# Patient Record
Sex: MALE | Race: BLACK OR AFRICAN AMERICAN | NOT HISPANIC OR LATINO | Employment: FULL TIME | URBAN - METROPOLITAN AREA
[De-identification: names, ages, dates, MRNs, and addresses within clinical notes are randomized per-mention and may not be internally consistent; named-entity substitution may affect disease eponyms.]

---

## 2021-05-10 ENCOUNTER — IMMUNIZATION (OUTPATIENT)
Dept: PRIMARY CARE CLINIC | Facility: CLINIC | Age: 41
End: 2021-05-10
Payer: MEDICAID

## 2021-05-10 DIAGNOSIS — Z23 NEED FOR VACCINATION: Primary | ICD-10-CM

## 2021-05-10 PROCEDURE — 91300 COVID-19, MRNA, LNP-S, PF, 30 MCG/0.3 ML DOSE VACCINE: ICD-10-PCS | Mod: S$GLB,,, | Performed by: INTERNAL MEDICINE

## 2021-05-10 PROCEDURE — 91300 COVID-19, MRNA, LNP-S, PF, 30 MCG/0.3 ML DOSE VACCINE: CPT | Mod: S$GLB,,, | Performed by: INTERNAL MEDICINE

## 2021-05-10 PROCEDURE — 0001A COVID-19, MRNA, LNP-S, PF, 30 MCG/0.3 ML DOSE VACCINE: CPT | Mod: CV19,S$GLB,, | Performed by: INTERNAL MEDICINE

## 2021-05-10 PROCEDURE — 0001A COVID-19, MRNA, LNP-S, PF, 30 MCG/0.3 ML DOSE VACCINE: ICD-10-PCS | Mod: CV19,S$GLB,, | Performed by: INTERNAL MEDICINE

## 2021-06-04 ENCOUNTER — HOSPITAL ENCOUNTER (EMERGENCY)
Facility: HOSPITAL | Age: 41
Discharge: HOME | End: 2021-06-04
Attending: EMERGENCY MEDICINE
Payer: MEDICAID

## 2021-06-04 VITALS
BODY MASS INDEX: 39.25 KG/M2 | HEIGHT: 69 IN | OXYGEN SATURATION: 98 % | HEART RATE: 99 BPM | DIASTOLIC BLOOD PRESSURE: 86 MMHG | SYSTOLIC BLOOD PRESSURE: 159 MMHG | TEMPERATURE: 99 F | RESPIRATION RATE: 16 BRPM | WEIGHT: 265 LBS

## 2021-06-04 DIAGNOSIS — R10.9 ABDOMINAL PAIN: ICD-10-CM

## 2021-06-04 DIAGNOSIS — R73.9 HYPERGLYCEMIA: ICD-10-CM

## 2021-06-04 DIAGNOSIS — K59.00 CONSTIPATION: ICD-10-CM

## 2021-06-04 DIAGNOSIS — Z76.0 MEDICATION REFILL: Primary | ICD-10-CM

## 2021-06-04 LAB
ALBUMIN SERPL BCP-MCNC: 4.1 G/DL (ref 3.5–5.2)
ALP SERPL-CCNC: 77 U/L (ref 55–135)
ALT SERPL W/O P-5'-P-CCNC: 24 U/L (ref 10–44)
ANION GAP SERPL CALC-SCNC: 13 MMOL/L (ref 8–16)
AST SERPL-CCNC: 23 U/L (ref 10–40)
BASOPHILS # BLD AUTO: 0.03 K/UL (ref 0–0.2)
BASOPHILS NFR BLD: 0.4 % (ref 0–1.9)
BILIRUB SERPL-MCNC: 0.7 MG/DL (ref 0.1–1)
BUN SERPL-MCNC: 8 MG/DL (ref 6–20)
CALCIUM SERPL-MCNC: 10 MG/DL (ref 8.7–10.5)
CHLORIDE SERPL-SCNC: 100 MMOL/L (ref 95–110)
CO2 SERPL-SCNC: 21 MMOL/L (ref 23–29)
CREAT SERPL-MCNC: 0.9 MG/DL (ref 0.5–1.4)
DIFFERENTIAL METHOD: ABNORMAL
EOSINOPHIL # BLD AUTO: 0.1 K/UL (ref 0–0.5)
EOSINOPHIL NFR BLD: 0.6 % (ref 0–8)
ERYTHROCYTE [DISTWIDTH] IN BLOOD BY AUTOMATED COUNT: 14.6 % (ref 11.5–14.5)
EST. GFR  (AFRICAN AMERICAN): >60 ML/MIN/1.73 M^2
EST. GFR  (NON AFRICAN AMERICAN): >60 ML/MIN/1.73 M^2
GLUCOSE SERPL-MCNC: 215 MG/DL (ref 70–110)
HCT VFR BLD AUTO: 53.9 % (ref 40–54)
HGB BLD-MCNC: 18.7 G/DL (ref 14–18)
IMM GRANULOCYTES # BLD AUTO: 0.04 K/UL (ref 0–0.04)
IMM GRANULOCYTES NFR BLD AUTO: 0.5 % (ref 0–0.5)
LIPASE SERPL-CCNC: 11 U/L (ref 4–60)
LYMPHOCYTES # BLD AUTO: 2.3 K/UL (ref 1–4.8)
LYMPHOCYTES NFR BLD: 28.3 % (ref 18–48)
MCH RBC QN AUTO: 29.2 PG (ref 27–31)
MCHC RBC AUTO-ENTMCNC: 34.7 G/DL (ref 32–36)
MCV RBC AUTO: 84 FL (ref 82–98)
MONOCYTES # BLD AUTO: 1 K/UL (ref 0.3–1)
MONOCYTES NFR BLD: 12.7 % (ref 4–15)
NEUTROPHILS # BLD AUTO: 4.6 K/UL (ref 1.8–7.7)
NEUTROPHILS NFR BLD: 57.5 % (ref 38–73)
NRBC BLD-RTO: 0 /100 WBC
PLATELET # BLD AUTO: 310 K/UL (ref 150–450)
PMV BLD AUTO: 9.4 FL (ref 9.2–12.9)
POTASSIUM SERPL-SCNC: 5 MMOL/L (ref 3.5–5.1)
PROT SERPL-MCNC: 8.9 G/DL (ref 6–8.4)
RBC # BLD AUTO: 6.4 M/UL (ref 4.6–6.2)
SODIUM SERPL-SCNC: 134 MMOL/L (ref 136–145)
WBC # BLD AUTO: 8.06 K/UL (ref 3.9–12.7)

## 2021-06-04 PROCEDURE — 80053 COMPREHEN METABOLIC PANEL: CPT | Performed by: EMERGENCY MEDICINE

## 2021-06-04 PROCEDURE — 93010 ELECTROCARDIOGRAM REPORT: CPT | Mod: ,,, | Performed by: INTERNAL MEDICINE

## 2021-06-04 PROCEDURE — 86703 HIV-1/HIV-2 1 RESULT ANTBDY: CPT | Performed by: EMERGENCY MEDICINE

## 2021-06-04 PROCEDURE — 85025 COMPLETE CBC W/AUTO DIFF WBC: CPT | Performed by: EMERGENCY MEDICINE

## 2021-06-04 PROCEDURE — 99284 EMERGENCY DEPT VISIT MOD MDM: CPT | Mod: ,,, | Performed by: EMERGENCY MEDICINE

## 2021-06-04 PROCEDURE — 25000003 PHARM REV CODE 250: Performed by: EMERGENCY MEDICINE

## 2021-06-04 PROCEDURE — 93005 ELECTROCARDIOGRAM TRACING: CPT

## 2021-06-04 PROCEDURE — 99284 PR EMERGENCY DEPT VISIT,LEVEL IV: ICD-10-PCS | Mod: ,,, | Performed by: EMERGENCY MEDICINE

## 2021-06-04 PROCEDURE — 83690 ASSAY OF LIPASE: CPT | Performed by: EMERGENCY MEDICINE

## 2021-06-04 PROCEDURE — 93010 EKG 12-LEAD: ICD-10-PCS | Mod: ,,, | Performed by: INTERNAL MEDICINE

## 2021-06-04 PROCEDURE — 99285 EMERGENCY DEPT VISIT HI MDM: CPT | Mod: 25

## 2021-06-04 PROCEDURE — 86803 HEPATITIS C AB TEST: CPT | Performed by: EMERGENCY MEDICINE

## 2021-06-04 RX ORDER — ONDANSETRON 4 MG/1
4 TABLET, FILM COATED ORAL EVERY 6 HOURS
Qty: 12 TABLET | Refills: 0 | Status: SHIPPED | OUTPATIENT
Start: 2021-06-04 | End: 2022-02-08

## 2021-06-04 RX ORDER — METFORMIN HYDROCHLORIDE 500 MG/1
500 TABLET ORAL ONCE
Status: COMPLETED | OUTPATIENT
Start: 2021-06-04 | End: 2021-06-04

## 2021-06-04 RX ORDER — POLYETHYLENE GLYCOL 3350 17 G/17G
17 POWDER, FOR SOLUTION ORAL DAILY
Qty: 10 EACH | Refills: 0 | Status: SHIPPED | OUTPATIENT
Start: 2021-06-04 | End: 2022-02-08

## 2021-06-04 RX ORDER — ONDANSETRON 4 MG/1
4 TABLET, ORALLY DISINTEGRATING ORAL
Status: COMPLETED | OUTPATIENT
Start: 2021-06-04 | End: 2021-06-04

## 2021-06-04 RX ORDER — OMEPRAZOLE 20 MG/1
20 CAPSULE, DELAYED RELEASE ORAL DAILY
Qty: 20 CAPSULE | Refills: 0 | Status: SHIPPED | OUTPATIENT
Start: 2021-06-04 | End: 2022-02-08

## 2021-06-04 RX ORDER — LIDOCAINE HYDROCHLORIDE 20 MG/ML
10 SOLUTION OROPHARYNGEAL
Status: COMPLETED | OUTPATIENT
Start: 2021-06-04 | End: 2021-06-04

## 2021-06-04 RX ORDER — METFORMIN HYDROCHLORIDE 500 MG/1
500 TABLET ORAL 2 TIMES DAILY WITH MEALS
Qty: 60 TABLET | Refills: 0 | Status: SHIPPED | OUTPATIENT
Start: 2021-06-04 | End: 2021-07-04

## 2021-06-04 RX ORDER — MAG HYDROX/ALUMINUM HYD/SIMETH 200-200-20
30 SUSPENSION, ORAL (FINAL DOSE FORM) ORAL
Status: COMPLETED | OUTPATIENT
Start: 2021-06-04 | End: 2021-06-04

## 2021-06-04 RX ADMIN — ALUMINUM HYDROXIDE, MAGNESIUM HYDROXIDE, AND SIMETHICONE 30 ML: 200; 200; 20 SUSPENSION ORAL at 05:06

## 2021-06-04 RX ADMIN — ONDANSETRON 4 MG: 4 TABLET, ORALLY DISINTEGRATING ORAL at 05:06

## 2021-06-04 RX ADMIN — LIDOCAINE HYDROCHLORIDE 10 ML: 20 SOLUTION ORAL at 05:06

## 2021-06-04 RX ADMIN — METFORMIN HYDROCHLORIDE 500 MG: 500 TABLET ORAL at 07:06

## 2021-06-07 LAB
HCV AB SERPL QL IA: NEGATIVE
HIV 1+2 AB+HIV1 P24 AG SERPL QL IA: NEGATIVE

## 2021-06-10 ENCOUNTER — IMMUNIZATION (OUTPATIENT)
Dept: PRIMARY CARE CLINIC | Facility: CLINIC | Age: 41
End: 2021-06-10
Payer: MEDICAID

## 2021-06-10 DIAGNOSIS — Z23 NEED FOR VACCINATION: Primary | ICD-10-CM

## 2021-06-10 PROCEDURE — 0002A COVID-19, MRNA, LNP-S, PF, 30 MCG/0.3 ML DOSE VACCINE: ICD-10-PCS | Mod: CV19,S$GLB,, | Performed by: INTERNAL MEDICINE

## 2021-06-10 PROCEDURE — 91300 COVID-19, MRNA, LNP-S, PF, 30 MCG/0.3 ML DOSE VACCINE: CPT | Mod: S$GLB,,, | Performed by: INTERNAL MEDICINE

## 2021-06-10 PROCEDURE — 0002A COVID-19, MRNA, LNP-S, PF, 30 MCG/0.3 ML DOSE VACCINE: CPT | Mod: CV19,S$GLB,, | Performed by: INTERNAL MEDICINE

## 2021-06-10 PROCEDURE — 91300 COVID-19, MRNA, LNP-S, PF, 30 MCG/0.3 ML DOSE VACCINE: ICD-10-PCS | Mod: S$GLB,,, | Performed by: INTERNAL MEDICINE

## 2021-06-17 ENCOUNTER — PATIENT OUTREACH (OUTPATIENT)
Dept: ADMINISTRATIVE | Facility: HOSPITAL | Age: 41
End: 2021-06-17

## 2022-02-05 ENCOUNTER — HOSPITAL ENCOUNTER (EMERGENCY)
Facility: HOSPITAL | Age: 42
Discharge: HOME | End: 2022-02-05
Attending: EMERGENCY MEDICINE
Payer: MEDICAID

## 2022-02-05 VITALS
RESPIRATION RATE: 19 BRPM | TEMPERATURE: 98 F | OXYGEN SATURATION: 99 % | BODY MASS INDEX: 39.1 KG/M2 | HEIGHT: 69 IN | SYSTOLIC BLOOD PRESSURE: 145 MMHG | HEART RATE: 113 BPM | WEIGHT: 264 LBS | DIASTOLIC BLOOD PRESSURE: 77 MMHG

## 2022-02-05 DIAGNOSIS — R73.9 HYPERGLYCEMIA: ICD-10-CM

## 2022-02-05 DIAGNOSIS — L02.91 ABSCESS: Primary | ICD-10-CM

## 2022-02-05 DIAGNOSIS — J02.9 PHARYNGITIS, UNSPECIFIED ETIOLOGY: ICD-10-CM

## 2022-02-05 DIAGNOSIS — L03.115 CELLULITIS OF RIGHT LOWER EXTREMITY: ICD-10-CM

## 2022-02-05 DIAGNOSIS — R00.0 TACHYCARDIA: ICD-10-CM

## 2022-02-05 LAB
ALBUMIN SERPL-MCNC: 3.6 G/DL (ref 3.3–5.5)
ALLENS TEST: ABNORMAL
ALP SERPL-CCNC: 97 U/L (ref 42–141)
BASOPHILS # BLD AUTO: 0.07 K/UL (ref 0–0.2)
BASOPHILS NFR BLD: 0.4 % (ref 0–1.9)
BILIRUB SERPL-MCNC: 0.9 MG/DL (ref 0.2–1.6)
BILIRUBIN, POC UA: NEGATIVE
BLOOD, POC UA: ABNORMAL
BUN SERPL-MCNC: 10 MG/DL (ref 7–22)
CALCIUM SERPL-MCNC: 9.6 MG/DL (ref 8–10.3)
CHLORIDE SERPL-SCNC: 104 MMOL/L (ref 98–108)
CLARITY, POC UA: CLEAR
COLOR, POC UA: YELLOW
CREAT SERPL-MCNC: 0.4 MG/DL (ref 0.6–1.2)
CTP QC/QA: YES
DIFFERENTIAL METHOD: ABNORMAL
EOSINOPHIL # BLD AUTO: 0 K/UL (ref 0–0.5)
EOSINOPHIL NFR BLD: 0.2 % (ref 0–8)
ERYTHROCYTE [DISTWIDTH] IN BLOOD BY AUTOMATED COUNT: 13.2 % (ref 11.5–14.5)
GLUCOSE SERPL-MCNC: 295 MG/DL (ref 73–118)
GLUCOSE, POC UA: ABNORMAL
HCO3 UR-SCNC: 23.2 MMOL/L (ref 24–28)
HCT VFR BLD AUTO: 45.2 % (ref 40–54)
HGB BLD-MCNC: 14.7 G/DL (ref 14–18)
IMM GRANULOCYTES # BLD AUTO: 0.12 K/UL (ref 0–0.04)
IMM GRANULOCYTES NFR BLD AUTO: 0.7 % (ref 0–0.5)
INFLUENZA A ANTIGEN, POC: NEGATIVE
INFLUENZA B ANTIGEN, POC: NEGATIVE
KETONES, POC UA: ABNORMAL
LDH SERPL L TO P-CCNC: 1.12 MMOL/L (ref 0.5–2.2)
LEUKOCYTE EST, POC UA: NEGATIVE
LYMPHOCYTES # BLD AUTO: 1 K/UL (ref 1–4.8)
LYMPHOCYTES NFR BLD: 5.9 % (ref 18–48)
MCH RBC QN AUTO: 28.8 PG (ref 27–31)
MCHC RBC AUTO-ENTMCNC: 32.5 G/DL (ref 32–36)
MCV RBC AUTO: 89 FL (ref 82–98)
MONOCYTES # BLD AUTO: 1.1 K/UL (ref 0.3–1)
MONOCYTES NFR BLD: 6.6 % (ref 4–15)
NEUTROPHILS # BLD AUTO: 14.2 K/UL (ref 1.8–7.7)
NEUTROPHILS NFR BLD: 86.2 % (ref 38–73)
NITRITE, POC UA: NEGATIVE
NRBC BLD-RTO: 0 /100 WBC
PCO2 BLDA: 39.8 MMHG (ref 35–45)
PH SMN: 7.37 [PH] (ref 7.35–7.45)
PH UR STRIP: 6 [PH]
PLATELET # BLD AUTO: 394 K/UL (ref 150–450)
PMV BLD AUTO: 10.1 FL (ref 9.2–12.9)
PO2 BLDA: 30 MMHG (ref 40–60)
POC ALT (SGPT): 21 U/L (ref 10–47)
POC AST (SGOT): 18 U/L (ref 11–38)
POC B-TYPE NATRIURETIC PEPTIDE: 5.9 PG/ML (ref 0–100)
POC BE: -2 MMOL/L
POC CARDIAC TROPONIN I: 0 NG/ML
POC CARDIAC TROPONIN I: 0 NG/ML
POC PTINR: 1.4 (ref 0.9–1.2)
POC PTWBT: 16.2 SEC (ref 9.7–14.3)
POC RAPID STREP A: NEGATIVE
POC SATURATED O2: 55 % (ref 95–100)
POC TCO2: 24 MMOL/L (ref 24–29)
POC TCO2: 27 MMOL/L (ref 18–33)
POCT GLUCOSE: 300 MG/DL (ref 70–110)
POTASSIUM BLD-SCNC: 4.5 MMOL/L (ref 3.6–5.1)
PROCALCITONIN SERPL IA-MCNC: 0.55 NG/ML
PROTEIN, POC UA: ABNORMAL
PROTEIN, POC: 8.4 G/DL (ref 6.4–8.1)
RBC # BLD AUTO: 5.1 M/UL (ref 4.6–6.2)
SAMPLE: ABNORMAL
SAMPLE: ABNORMAL
SAMPLE: NORMAL
SAMPLE: NORMAL
SARS-COV-2 RDRP RESP QL NAA+PROBE: NEGATIVE
SITE: ABNORMAL
SODIUM BLD-SCNC: 136 MMOL/L (ref 128–145)
SPECIFIC GRAVITY, POC UA: 1.02
UROBILINOGEN, POC UA: 1 E.U./DL
WBC # BLD AUTO: 16.49 K/UL (ref 3.9–12.7)

## 2022-02-05 PROCEDURE — 85610 PROTHROMBIN TIME: CPT | Mod: ER

## 2022-02-05 PROCEDURE — 93005 ELECTROCARDIOGRAM TRACING: CPT | Mod: ER

## 2022-02-05 PROCEDURE — 85025 COMPLETE CBC W/AUTO DIFF WBC: CPT | Performed by: EMERGENCY MEDICINE

## 2022-02-05 PROCEDURE — 96361 HYDRATE IV INFUSION ADD-ON: CPT | Mod: ER

## 2022-02-05 PROCEDURE — 80053 COMPREHEN METABOLIC PANEL: CPT | Mod: ER

## 2022-02-05 PROCEDURE — 83880 ASSAY OF NATRIURETIC PEPTIDE: CPT | Mod: ER

## 2022-02-05 PROCEDURE — 93010 ELECTROCARDIOGRAM REPORT: CPT | Mod: ,,, | Performed by: INTERNAL MEDICINE

## 2022-02-05 PROCEDURE — 99291 CRITICAL CARE FIRST HOUR: CPT | Mod: 25,ER

## 2022-02-05 PROCEDURE — 63600175 PHARM REV CODE 636 W HCPCS: Mod: ER | Performed by: EMERGENCY MEDICINE

## 2022-02-05 PROCEDURE — 87502 INFLUENZA DNA AMP PROBE: CPT | Mod: ER

## 2022-02-05 PROCEDURE — 81003 URINALYSIS AUTO W/O SCOPE: CPT | Mod: ER

## 2022-02-05 PROCEDURE — 82962 GLUCOSE BLOOD TEST: CPT | Mod: ER

## 2022-02-05 PROCEDURE — 84145 PROCALCITONIN (PCT): CPT | Performed by: EMERGENCY MEDICINE

## 2022-02-05 PROCEDURE — 87186 SC STD MICRODIL/AGAR DIL: CPT | Performed by: EMERGENCY MEDICINE

## 2022-02-05 PROCEDURE — 96374 THER/PROPH/DIAG INJ IV PUSH: CPT | Mod: ER,59

## 2022-02-05 PROCEDURE — 25000003 PHARM REV CODE 250: Mod: ER | Performed by: EMERGENCY MEDICINE

## 2022-02-05 PROCEDURE — 87077 CULTURE AEROBIC IDENTIFY: CPT | Performed by: EMERGENCY MEDICINE

## 2022-02-05 PROCEDURE — 82803 BLOOD GASES ANY COMBINATION: CPT | Mod: ER

## 2022-02-05 PROCEDURE — 93010 EKG 12-LEAD: ICD-10-PCS | Mod: ,,, | Performed by: INTERNAL MEDICINE

## 2022-02-05 PROCEDURE — 87040 BLOOD CULTURE FOR BACTERIA: CPT | Mod: 59 | Performed by: EMERGENCY MEDICINE

## 2022-02-05 PROCEDURE — 96375 TX/PRO/DX INJ NEW DRUG ADDON: CPT | Mod: ER,59

## 2022-02-05 PROCEDURE — 10060 I&D ABSCESS SIMPLE/SINGLE: CPT | Mod: ER

## 2022-02-05 PROCEDURE — 87070 CULTURE OTHR SPECIMN AEROBIC: CPT | Mod: 59 | Performed by: EMERGENCY MEDICINE

## 2022-02-05 PROCEDURE — 85025 COMPLETE CBC W/AUTO DIFF WBC: CPT | Mod: ER

## 2022-02-05 PROCEDURE — 96372 THER/PROPH/DIAG INJ SC/IM: CPT | Mod: 59,ER

## 2022-02-05 PROCEDURE — U0002 COVID-19 LAB TEST NON-CDC: HCPCS | Mod: ER | Performed by: EMERGENCY MEDICINE

## 2022-02-05 PROCEDURE — 84484 ASSAY OF TROPONIN QUANT: CPT | Mod: ER

## 2022-02-05 RX ORDER — ACETAMINOPHEN 500 MG
1000 TABLET ORAL EVERY 6 HOURS PRN
Qty: 30 TABLET | Refills: 0 | Status: SHIPPED | OUTPATIENT
Start: 2022-02-05 | End: 2022-02-05 | Stop reason: SDUPTHER

## 2022-02-05 RX ORDER — CLINDAMYCIN HYDROCHLORIDE 150 MG/1
300 CAPSULE ORAL 4 TIMES DAILY
Qty: 80 CAPSULE | Refills: 0 | Status: ON HOLD | OUTPATIENT
Start: 2022-02-05 | End: 2022-02-13 | Stop reason: HOSPADM

## 2022-02-05 RX ORDER — KETOROLAC TROMETHAMINE 30 MG/ML
15 INJECTION, SOLUTION INTRAMUSCULAR; INTRAVENOUS
Status: COMPLETED | OUTPATIENT
Start: 2022-02-05 | End: 2022-02-05

## 2022-02-05 RX ORDER — MORPHINE SULFATE 4 MG/ML
6 INJECTION, SOLUTION INTRAMUSCULAR; INTRAVENOUS
Status: COMPLETED | OUTPATIENT
Start: 2022-02-05 | End: 2022-02-05

## 2022-02-05 RX ORDER — LIDOCAINE HYDROCHLORIDE 20 MG/ML
SOLUTION OROPHARYNGEAL
Qty: 60 ML | Refills: 0 | Status: SHIPPED | OUTPATIENT
Start: 2022-02-05

## 2022-02-05 RX ORDER — CLINDAMYCIN PHOSPHATE 150 MG/ML
600 INJECTION, SOLUTION INTRAVENOUS
Status: COMPLETED | OUTPATIENT
Start: 2022-02-05 | End: 2022-02-05

## 2022-02-05 RX ORDER — OXYCODONE AND ACETAMINOPHEN 5; 325 MG/1; MG/1
1 TABLET ORAL EVERY 8 HOURS PRN
Qty: 6 TABLET | Refills: 0 | Status: ON HOLD | OUTPATIENT
Start: 2022-02-05 | End: 2022-02-13 | Stop reason: HOSPADM

## 2022-02-05 RX ORDER — OXYCODONE AND ACETAMINOPHEN 5; 325 MG/1; MG/1
2 TABLET ORAL
Status: COMPLETED | OUTPATIENT
Start: 2022-02-05 | End: 2022-02-05

## 2022-02-05 RX ORDER — AMOXICILLIN AND CLAVULANATE POTASSIUM 875; 125 MG/1; MG/1
1 TABLET, FILM COATED ORAL 2 TIMES DAILY
Qty: 20 TABLET | Refills: 0 | Status: SHIPPED | OUTPATIENT
Start: 2022-02-05 | End: 2022-02-05

## 2022-02-05 RX ORDER — ACETAMINOPHEN 500 MG
500 TABLET ORAL EVERY 6 HOURS PRN
Qty: 30 TABLET | Refills: 0 | Status: SHIPPED | OUTPATIENT
Start: 2022-02-05

## 2022-02-05 RX ORDER — MUPIROCIN 20 MG/G
OINTMENT TOPICAL
Status: COMPLETED | OUTPATIENT
Start: 2022-02-05 | End: 2022-02-05

## 2022-02-05 RX ORDER — LIDOCAINE HYDROCHLORIDE 10 MG/ML
5 INJECTION INFILTRATION; PERINEURAL
Status: COMPLETED | OUTPATIENT
Start: 2022-02-05 | End: 2022-02-05

## 2022-02-05 RX ORDER — ACETAMINOPHEN 500 MG
1000 TABLET ORAL
Status: DISCONTINUED | OUTPATIENT
Start: 2022-02-05 | End: 2022-02-05

## 2022-02-05 RX ORDER — LIDOCAINE HYDROCHLORIDE 20 MG/ML
SOLUTION OROPHARYNGEAL
Qty: 60 ML | Refills: 0 | Status: SHIPPED | OUTPATIENT
Start: 2022-02-05 | End: 2022-02-05 | Stop reason: SDUPTHER

## 2022-02-05 RX ORDER — IBUPROFEN 600 MG/1
600 TABLET ORAL EVERY 6 HOURS PRN
Qty: 20 TABLET | Refills: 0 | Status: SHIPPED | OUTPATIENT
Start: 2022-02-05 | End: 2022-02-05 | Stop reason: SDUPTHER

## 2022-02-05 RX ORDER — IBUPROFEN 600 MG/1
600 TABLET ORAL EVERY 6 HOURS PRN
Qty: 20 TABLET | Refills: 0 | Status: SHIPPED | OUTPATIENT
Start: 2022-02-05

## 2022-02-05 RX ADMIN — MUPIROCIN: 20 OINTMENT TOPICAL at 09:02

## 2022-02-05 RX ADMIN — CLINDAMYCIN PHOSPHATE 600 MG: 150 INJECTION, SOLUTION INTRAMUSCULAR; INTRAVENOUS at 09:02

## 2022-02-05 RX ADMIN — MORPHINE SULFATE 6 MG: 4 INJECTION, SOLUTION INTRAMUSCULAR; INTRAVENOUS at 11:02

## 2022-02-05 RX ADMIN — OXYCODONE HYDROCHLORIDE AND ACETAMINOPHEN 2 TABLET: 5; 325 TABLET ORAL at 02:02

## 2022-02-05 RX ADMIN — KETOROLAC TROMETHAMINE 15 MG: 30 INJECTION, SOLUTION INTRAMUSCULAR at 09:02

## 2022-02-05 RX ADMIN — SODIUM CHLORIDE 1000 ML: 0.9 INJECTION, SOLUTION INTRAVENOUS at 02:02

## 2022-02-05 RX ADMIN — LIDOCAINE HYDROCHLORIDE 5 ML: 10 INJECTION, SOLUTION INFILTRATION; PERINEURAL at 02:02

## 2022-02-05 RX ADMIN — SODIUM CHLORIDE 2000 ML: 0.9 INJECTION, SOLUTION INTRAVENOUS at 09:02

## 2022-02-05 NOTE — Clinical Note
"Chadd Cullenlee Alonzo was seen and treated in our emergency department on 2/5/2022.  He may return to work on 02/08/2022.       If you have any questions or concerns, please don't hesitate to call.      Jayshree Hernandez, DO"

## 2022-02-05 NOTE — ED PROVIDER NOTES
"Encounter Date: 2/5/2022    SCRIBE #1 NOTE: I, Antonina Mack, am scribing for, and in the presence of,  Jayshree Hernandez DO. I have scribed the following portions of the note - Other sections scribed: HPI; ROS; PE.       History     Chief Complaint   Patient presents with    Abscess     Complains of painful abscess to R side of groin x3 days.    COVID-19 Concerns     Complains of headache, nasal congestion, sore throat, body aches, and nausea x4 days.     Chadd Alonzo is a 41 y.o. male with Hx of DM and HTN who presents to the ED for chief complaint of sore throat onset 4 days ago and "boil" to right upper thigh onset 2 days ago. Patient reports that the "boil" has been draining. He notes having an associated symptom of myalgias. Patient endorses taking the Covid-19 vaccine but denies taking the Influenza vaccine. He denies previous history of a Covid-19 infection. Patient denies fever. No further complaints at this present time.     The history is provided by the patient. No  was used.     Review of patient's allergies indicates:  No Known Allergies  Past Medical History:   Diagnosis Date    Diabetes mellitus     Hypertension      History reviewed. No pertinent surgical history.  History reviewed. No pertinent family history.  Social History     Tobacco Use    Smoking status: Never Smoker    Smokeless tobacco: Never Used   Substance Use Topics    Alcohol use: No    Drug use: No     Review of Systems   Constitutional: Negative for chills and fever.   HENT: Positive for sore throat.    Respiratory: Negative for shortness of breath.    Cardiovascular: Negative for chest pain.   Gastrointestinal: Negative for abdominal pain, diarrhea, nausea and vomiting.   Genitourinary: Negative for dysuria.   Musculoskeletal: Positive for myalgias.   Skin: Positive for wound.   Neurological: Negative for headaches.   All other systems reviewed and are negative.      Physical Exam     Initial Vitals [02/05/22 " 0835]   BP Pulse Resp Temp SpO2   (!) 145/81 (!) 140 20 99.2 °F (37.3 °C) 97 %      MAP       --         Patient gave consent to have physical exam performed.  Physical Exam    Nursing note and vitals reviewed.  Constitutional: He appears well-developed and well-nourished.   HENT:   Head: Normocephalic and atraumatic.   Right Ear: External ear normal.   Left Ear: External ear normal.   Nose: Nose normal.   Mouth/Throat: Posterior oropharyngeal erythema present. No oropharyngeal exudate or posterior oropharyngeal edema.   Eyes: Conjunctivae and EOM are normal. Pupils are equal, round, and reactive to light.   Neck: Neck supple.   Normal range of motion.  Cardiovascular: Regular rhythm and normal heart sounds. Tachycardia present.  Exam reveals no gallop and no friction rub.    No murmur heard.  Pulmonary/Chest: Breath sounds normal. Tachypnea noted. No respiratory distress. He has no wheezes. He has no rhonchi. He has no rales.   Abdominal: Abdomen is soft. Bowel sounds are normal. There is no abdominal tenderness. There is no rebound and no guarding.   Musculoskeletal:         General: No tenderness or edema. Normal range of motion.      Cervical back: Normal range of motion and neck supple.     Neurological: He is alert and oriented to person, place, and time. No cranial nerve deficit.   Skin: Skin is warm and dry. Capillary refill takes less than 2 seconds. No rash noted.        Psychiatric: He has a normal mood and affect. His behavior is normal.         ED Course   I & D - Incision and Drainage    Date/Time: 2/5/2022 1:36 PM  Location procedure was performed: [facility] EMERGENCY DEPARTMENT  Performed by: Jayshree Hernandez DO  Authorized by: Jayshree Hernandez DO   Consent Done: Yes  Consent: Verbal consent obtained.  Risks and benefits: risks, benefits and alternatives were discussed  Consent given by: patient  Patient understanding: patient states understanding of the procedure being performed  Patient consent: the patient's  "understanding of the procedure matches consent given  Procedure consent: procedure consent matches procedure scheduled  Required items: required blood products, implants, devices, and special equipment available  Patient identity confirmed: verbally with patient  Time out: Immediately prior to procedure a "time out" was called to verify the correct patient, procedure, equipment, support staff and site/side marked as required.  Type: abscess  Body area: lower extremity (Right thigh)  Anesthesia: local infiltration    Anesthesia:  Local Anesthetic: lidocaine 1% with epinephrine  Anesthetic total: 10 mL    Patient sedated: no  Scalpel size: 11  Incision type: single straight  Complexity: simple  Drainage: serosanguinous  Drainage amount: scant  Wound treatment: incision  Packing material: 1/4 in gauze  Complications: No  Specimens: No  Implants: No  Patient tolerance: Patient tolerated the procedure well with no immediate complications  Comments: Aerobic culture pending.    Critical Care    Date/Time: 2/5/2022 3:53 PM  Performed by: Jayshree Hernandez DO  Authorized by: Jayshree Hernandez DO   Direct patient critical care time: 8 minutes  Additional history critical care time: 8 minutes  Ordering / reviewing critical care time: 8 minutes  Documentation critical care time: 6 minutes  Total critical care time (exclusive of procedural time) : 30 minutes  Critical care was necessary to treat or prevent imminent or life-threatening deterioration of the following conditions: circulatory failure, metabolic crisis and dehydration.  Critical care was time spent personally by me on the following activities: evaluation of patient's response to treatment, examination of patient, obtaining history from patient or surrogate, ordering and performing treatments and interventions, ordering and review of laboratory studies, ordering and review of radiographic studies, pulse oximetry, re-evaluation of patient's condition and review of old " charts.        Labs Reviewed   PROCALCITONIN - Abnormal; Notable for the following components:       Result Value    Procalcitonin 0.55 (*)     All other components within normal limits   CBC W/ AUTO DIFFERENTIAL - Abnormal; Notable for the following components:    WBC 16.49 (*)     Immature Granulocytes 0.7 (*)     Gran # (ANC) 14.2 (*)     Immature Grans (Abs) 0.12 (*)     Mono # 1.1 (*)     Gran % 86.2 (*)     Lymph % 5.9 (*)     All other components within normal limits   POCT URINALYSIS W/O SCOPE - Abnormal; Notable for the following components:    Glucose, UA 2+ (*)     Ketones, UA 3+ (*)     Blood, UA Trace-intact (*)     Protein, UA Trace (*)     All other components within normal limits   POCT GLUCOSE - Abnormal; Notable for the following components:    POCT Glucose 300 (*)     All other components within normal limits   ISTAT PROCEDURE - Abnormal; Notable for the following components:    POC PTWBT 16.2 (*)     POC PTINR 1.4 (*)     All other components within normal limits   ISTAT PROCEDURE - Abnormal; Notable for the following components:    POC PO2 30 (*)     POC HCO3 23.2 (*)     POC SATURATED O2 55 (*)     All other components within normal limits   POCT CMP - Abnormal; Notable for the following components:    POC Creatinine 0.4 (*)     POC Glucose 295 (*)     Protein, UA 8.4 (*)     All other components within normal limits   CULTURE, BLOOD   CULTURE, BLOOD   CULTURE, AEROBIC  (SPECIFY SOURCE)   TROPONIN ISTAT   TROPONIN ISTAT   SARS-COV-2 RDRP GENE    Narrative:     This test utilizes isothermal nucleic acid amplification   technology to detect the SARS-CoV-2 RdRp nucleic acid segment.   The analytical sensitivity (limit of detection) is 125 genome   equivalents/mL.   A POSITIVE result implies infection with the SARS-CoV-2 virus;   the patient is presumed to be contagious.     A NEGATIVE result means that SARS-CoV-2 nucleic acids are not   present above the limit of detection. A NEGATIVE result  should be   treated as presumptive. It does not rule out the possibility of   COVID-19 and should not be the sole basis for treatment decisions.   If COVID-19 is strongly suspected based on clinical and exposure   history, re-testing using an alternate molecular assay should be   considered.   This test is only for use under the Food and Drug   Administration s Emergency Use Authorization (EUA).   Commercial kits are provided by [employer].   Performance characteristics of the EUA have been independently   verified by [facility] Department of   Pathology and Laboratory Medicine.   _________________________________________________________________   The authorized Fact Sheet for Healthcare Providers and the authorized Fact   Sheet for Patients of the ID NOW COVID-19 are available on the FDA   website:     https://www.fda.gov/media/167990/download  https://www.fda.gov/media/085387/download       POCT URINALYSIS W/O SCOPE   POCT CBC   POCT STREP A MOLECULAR   POCT INFLUENZA A/B MOLECULAR   POCT CMP   POCT PROTIME-INR   POCT B-TYPE NATRIURETIC PEPTIDE (BNP)   POCT TROPONIN   POCT STREP A, RAPID   POCT RAPID INFLUENZA A/B   POCT B-TYPE NATRIURETIC PEPTIDE (BNP)   POCT TROPONIN     EKG Readings: (Independently Interpreted)   No STEMI. Rate of 124. Sinus Tachycardia. Normal Axis. Aside from rate, normal EKG. QTc normal at 436. When compared to prior EKG dated 06/04/21 rate increased by 4 bpm.    Other EKG Interpretations: Repeat EKG done at 2:13 p.m.  No STEMI, sinus tachycardia, ventricular rate of 115  Normal axis, aside from rate normal EKG, QTC is 437.  When compared to earlier EKG rate has decreased by 9 beats per minute today        Imaging Results          X-Ray Chest AP Portable (Final result)  Result time 02/05/22 09:28:15    Final result by Dima Cannon MD (02/05/22 09:28:15)                 Impression:      No acute abnormality.      Electronically signed by: Dima Cannon  "MD  Date:    02/05/2022  Time:    09:28             Narrative:    EXAMINATION:  XR CHEST AP PORTABLE    CLINICAL HISTORY:  Sepsis;    TECHNIQUE:  Single frontal view of the chest was performed.    COMPARISON:  Chest radiograph from 07/11/2018    FINDINGS:  The lungs are clear, with normal appearance of pulmonary vasculature and no pleural effusion or pneumothorax.    The cardiac silhouette is normal in size. The hilar and mediastinal contours are unremarkable.    Bones are intact.                                     Medications   sodium chloride 0.9% bolus 2,000 mL (0 mLs Intravenous Stopped 2/5/22 1036)   mupirocin 2 % ointment ( Topical (Top) Given 2/5/22 0925)   ketorolac injection 15 mg (15 mg Intravenous Given 2/5/22 0938)   clindamycin injection 600 mg (600 mg Intramuscular Given 2/5/22 0925)   morphine injection 6 mg (6 mg Intravenous Given 2/5/22 1113)   LIDOcaine HCL 10 mg/ml (1%) injection 5 mL (5 mLs Infiltration Given by Other 2/5/22 1403)   oxyCODONE-acetaminophen 5-325 mg per tablet 2 tablet (2 tablets Oral Given 2/5/22 1402)   sodium chloride 0.9% bolus 1,000 mL (0 mLs Intravenous Stopped 2/5/22 1502)     Medical Decision Making:   History:   Old Medical Records: I decided to obtain old medical records.  Independently Interpreted Test(s):   I have ordered and independently interpreted X-rays - see prior notes.  I have ordered and independently interpreted EKG Reading(s) - see prior notes  Clinical Tests:   Lab Tests: Ordered and Reviewed  Radiological Study: Ordered and Reviewed  Medical Tests: Ordered and Reviewed  Chief complaint: sore throat onset 4 days ago and "boil" to right upper thigh onset 2 days ago.   Differential diagnosis: Hyperglycemia, DKA, Covid-19, [place] A, Influenza B, streptococcal pharyngitis, cellulitis, sepsis    Treatment in the ED: PE, mupirocin 2 % ointment   sodium chloride 0.9% bolus 2,000 mL       clindamycin injection 600 mg   ketorolac injection 15 mg   Initially " patient refused I and D however after continued pain of his right thigh in the ED that is 10/10 even post morphine patient is now agreeable to I and D being performed at 1:00 p.m.  Patient reports feeling better after treatment in the ER.  Patient states he wants to try at home antibiotic therapy.  Patient states he will return to the ED in 2 days for wound check.  Advised patient to return immediately if symptoms worsen or if he is feeling really bad.    Discussed treatment, prescriptions, labs, and imaging results.    Fill and take prescriptions as directed.  Return to the ED if symptoms worsen or do not resolve.   Answered questions and discussed discharge plan.    Patient feels better and is ready for discharge.  Follow up with PCP/specialist in 1 day.        Scribe Attestation:   Scribe #1: I performed the above scribed service and the documentation accurately describes the services I performed. I attest to the accuracy of the note.                I, Dr. Jayshree Hernandez, personally performed the services described in this documentation. This document was produced by a scribe under my direction and in my presence. All medical record entries made by the scribe were at my direction and in my presence.  I have reviewed the chart and agree that the record reflects my personal performance and is accurate and complete. Jayshree Hernandez DO.     02/05/2022 1:36 PM    Clinical Impression:   Final diagnoses:  [R00.0] Tachycardia  [L02.91] Abscess (Primary)  [L03.115] Cellulitis of right lower extremity  [J02.9] Pharyngitis, unspecified etiology  [R73.9] Hyperglycemia          ED Disposition Condition    Discharge Stable        ED Prescriptions     Medication Sig Dispense Start Date End Date Auth. Provider    amoxicillin-clavulanate 875-125mg (AUGMENTIN) 875-125 mg per tablet  (Status: Discontinued) Take 1 tablet by mouth 2 (two) times daily. for 10 days 20 tablet 2/5/2022 2/5/2022 Jayshree Hernandez DO    ibuprofen  (ADVIL,MOTRIN) 600 MG tablet  (Status: Discontinued) Take 1 tablet (600 mg total) by mouth every 6 (six) hours as needed for Pain (Take with food as needed for mild-to-moderate pain). 20 tablet 2/5/2022 2/5/2022 Jayshree Hernandez, DO    acetaminophen (TYLENOL) 500 MG tablet  (Status: Discontinued) Take 2 tablets (1,000 mg total) by mouth every 6 (six) hours as needed for Pain (As needed for pain and fever). 30 tablet 2/5/2022 2/5/2022 Jayshree Hernandez, DO    LIDOcaine HCl 2% (XYLOCAINE) 2 % Soln  (Status: Discontinued) by Mucous Membrane route every 3 (three) hours as needed. Apply 1 tsp to painful area every 3 hr as needed for pain 60 mL 2/5/2022 2/5/2022 Jayshree Hernandez, DO    acetaminophen (TYLENOL) 500 MG tablet Take 1 tablet (500 mg total) by mouth every 6 (six) hours as needed for Pain (As needed for pain and fever). 30 tablet 2/5/2022  Jayshree Hernandez, DO    LIDOcaine HCl 2% (XYLOCAINE) 2 % Soln by Mucous Membrane route every 3 (three) hours as needed (As needed for sore throat). Apply 1 tsp to painful area every 3 hr as needed for pain 60 mL 2/5/2022  Jayshree Hernandez, DO    oxyCODONE-acetaminophen (PERCOCET) 5-325 mg per tablet Take 1 tablet by mouth every 8 (eight) hours as needed for Pain (As needed for severe 10/10 pain). 6 tablet 2/5/2022  Jayshree Hernandez, DO    ibuprofen (ADVIL,MOTRIN) 600 MG tablet Take 1 tablet (600 mg total) by mouth every 6 (six) hours as needed for Pain (Take with food as needed for mild-to-moderate pain). 20 tablet 2/5/2022  Jayshree Hernandez, DO    clindamycin (CLEOCIN) 150 MG capsule Take 2 capsules (300 mg total) by mouth 4 (four) times daily. for 10 days 80 capsule 2/5/2022 2/15/2022 Jayshree Hernandez, DO        Follow-up Information     Follow up With Specialties Details Why Contact Info Additional Information    [facility]  Schedule an appointment as soon as possible for a visit in 1 day Please establish a primary care physician [address] [place] [address]  [phone]       [facility]  Family Medicine Family Medicine Schedule an appointment as soon as possible for a visit in 1 day Please establish a primary care physician [address], [address]  [facility] [address] [phone] Please park in [address] or D and use Saurabh baron. [facility] Medical Office Bldg. elevators.    [facility] - Emergency Dept Emergency Medicine Go to  Please go to [facility] emergency department if symptoms worsen [address]  [facility] [address] [phone]            Jayshree Hernandez,   02/05/22 1553

## 2022-02-07 ENCOUNTER — HOSPITAL ENCOUNTER (INPATIENT)
Facility: HOSPITAL | Age: 42
LOS: 6 days | Discharge: HOME | DRG: 871 | End: 2022-02-13
Attending: EMERGENCY MEDICINE | Admitting: HOSPITALIST
Payer: MEDICAID

## 2022-02-07 DIAGNOSIS — R10.13 EPIGASTRIC PAIN: ICD-10-CM

## 2022-02-07 DIAGNOSIS — E13.10 DIABETIC KETOACIDOSIS WITHOUT COMA ASSOCIATED WITH OTHER SPECIFIED DIABETES MELLITUS: ICD-10-CM

## 2022-02-07 DIAGNOSIS — E11.10 DKA, TYPE 2: ICD-10-CM

## 2022-02-07 DIAGNOSIS — R10.9 ABDOMINAL PAIN: ICD-10-CM

## 2022-02-07 DIAGNOSIS — L02.419 THIGH ABSCESS: Primary | ICD-10-CM

## 2022-02-07 PROBLEM — I10 ESSENTIAL HYPERTENSION: Status: ACTIVE | Noted: 2022-02-07

## 2022-02-07 PROBLEM — A41.9 SEPSIS: Status: ACTIVE | Noted: 2022-02-07

## 2022-02-07 PROBLEM — L02.415 ABSCESS OF RIGHT THIGH: Status: ACTIVE | Noted: 2022-02-07

## 2022-02-07 LAB
ALBUMIN SERPL BCP-MCNC: 2.7 G/DL (ref 3.5–5.2)
ALLENS TEST: ABNORMAL
ALP SERPL-CCNC: 109 U/L (ref 55–135)
ALT SERPL W/O P-5'-P-CCNC: 15 U/L (ref 10–44)
ANION GAP SERPL CALC-SCNC: 17 MMOL/L (ref 8–16)
AST SERPL-CCNC: 11 U/L (ref 10–40)
B-OH-BUTYR BLD STRIP-SCNC: 4.8 MMOL/L (ref 0–0.5)
BACTERIA #/AREA URNS HPF: NORMAL /HPF
BACTERIA SPEC AEROBE CULT: ABNORMAL
BASOPHILS # BLD AUTO: 0.02 K/UL (ref 0–0.2)
BASOPHILS NFR BLD: 0.1 % (ref 0–1.9)
BILIRUB SERPL-MCNC: 0.3 MG/DL (ref 0.1–1)
BILIRUB UR QL STRIP: NEGATIVE
BUN SERPL-MCNC: 8 MG/DL (ref 6–20)
CALCIUM SERPL-MCNC: 9.6 MG/DL (ref 8.7–10.5)
CHLORIDE SERPL-SCNC: 104 MMOL/L (ref 95–110)
CLARITY UR: CLEAR
CO2 SERPL-SCNC: 15 MMOL/L (ref 23–29)
COLOR UR: YELLOW
CREAT SERPL-MCNC: 0.9 MG/DL (ref 0.5–1.4)
CTP QC/QA: YES
DELSYS: ABNORMAL
DIFFERENTIAL METHOD: ABNORMAL
EOSINOPHIL # BLD AUTO: 0 K/UL (ref 0–0.5)
EOSINOPHIL NFR BLD: 0 % (ref 0–8)
ERYTHROCYTE [DISTWIDTH] IN BLOOD BY AUTOMATED COUNT: 13.3 % (ref 11.5–14.5)
EST. GFR  (AFRICAN AMERICAN): >60 ML/MIN/1.73 M^2
EST. GFR  (NON AFRICAN AMERICAN): >60 ML/MIN/1.73 M^2
GLUCOSE SERPL-MCNC: 224 MG/DL (ref 70–110)
GLUCOSE SERPL-MCNC: 276 MG/DL (ref 70–110)
GLUCOSE UR QL STRIP: ABNORMAL
HCO3 UR-SCNC: 16.6 MMOL/L (ref 24–28)
HCT VFR BLD AUTO: 42.6 % (ref 40–54)
HGB BLD-MCNC: 13.6 G/DL (ref 14–18)
HGB UR QL STRIP: NEGATIVE
HYALINE CASTS #/AREA URNS LPF: 0 /LPF
IMM GRANULOCYTES # BLD AUTO: 0.17 K/UL (ref 0–0.04)
IMM GRANULOCYTES NFR BLD AUTO: 0.8 % (ref 0–0.5)
KETONES UR QL STRIP: ABNORMAL
LACTATE SERPL-SCNC: 1.2 MMOL/L (ref 0.5–2.2)
LEUKOCYTE ESTERASE UR QL STRIP: NEGATIVE
LIPASE SERPL-CCNC: 29 U/L (ref 4–60)
LYMPHOCYTES # BLD AUTO: 0.7 K/UL (ref 1–4.8)
LYMPHOCYTES NFR BLD: 3.4 % (ref 18–48)
MCH RBC QN AUTO: 28.6 PG (ref 27–31)
MCHC RBC AUTO-ENTMCNC: 31.9 G/DL (ref 32–36)
MCV RBC AUTO: 90 FL (ref 82–98)
MICROSCOPIC COMMENT: NORMAL
MONOCYTES # BLD AUTO: 0.8 K/UL (ref 0.3–1)
MONOCYTES NFR BLD: 3.8 % (ref 4–15)
NEUTROPHILS # BLD AUTO: 18.4 K/UL (ref 1.8–7.7)
NEUTROPHILS NFR BLD: 91.9 % (ref 38–73)
NITRITE UR QL STRIP: NEGATIVE
NRBC BLD-RTO: 0 /100 WBC
PCO2 BLDA: 35.9 MMHG (ref 35–45)
PH SMN: 7.27 [PH] (ref 7.35–7.45)
PH UR STRIP: 6 [PH] (ref 5–8)
PLATELET # BLD AUTO: 415 K/UL (ref 150–450)
PMV BLD AUTO: 9.5 FL (ref 9.2–12.9)
PO2 BLDA: 24 MMHG (ref 40–60)
POC BE: -9 MMOL/L
POC SATURATED O2: 37 % (ref 95–100)
POC TCO2: 18 MMOL/L (ref 24–29)
POTASSIUM SERPL-SCNC: 4.5 MMOL/L (ref 3.5–5.1)
PROT SERPL-MCNC: 8.3 G/DL (ref 6–8.4)
PROT UR QL STRIP: ABNORMAL
RBC # BLD AUTO: 4.75 M/UL (ref 4.6–6.2)
RBC #/AREA URNS HPF: 2 /HPF (ref 0–4)
SAMPLE: ABNORMAL
SARS-COV-2 RDRP RESP QL NAA+PROBE: NEGATIVE
SITE: ABNORMAL
SODIUM SERPL-SCNC: 136 MMOL/L (ref 136–145)
SP GR UR STRIP: >1.03 (ref 1–1.03)
SQUAMOUS #/AREA URNS HPF: 2 /HPF
TROPONIN I SERPL DL<=0.01 NG/ML-MCNC: <0.006 NG/ML (ref 0–0.03)
URN SPEC COLLECT METH UR: ABNORMAL
UROBILINOGEN UR STRIP-ACNC: NEGATIVE EU/DL
WBC # BLD AUTO: 20.04 K/UL (ref 3.9–12.7)
WBC #/AREA URNS HPF: 4 /HPF (ref 0–5)
YEAST URNS QL MICRO: NORMAL

## 2022-02-07 PROCEDURE — 82962 GLUCOSE BLOOD TEST: CPT

## 2022-02-07 PROCEDURE — 96374 THER/PROPH/DIAG INJ IV PUSH: CPT

## 2022-02-07 PROCEDURE — 25000003 PHARM REV CODE 250: Performed by: EMERGENCY MEDICINE

## 2022-02-07 PROCEDURE — 83605 ASSAY OF LACTIC ACID: CPT | Performed by: EMERGENCY MEDICINE

## 2022-02-07 PROCEDURE — 83690 ASSAY OF LIPASE: CPT | Performed by: EMERGENCY MEDICINE

## 2022-02-07 PROCEDURE — 84484 ASSAY OF TROPONIN QUANT: CPT | Performed by: EMERGENCY MEDICINE

## 2022-02-07 PROCEDURE — 25500020 PHARM REV CODE 255: Performed by: STUDENT IN AN ORGANIZED HEALTH CARE EDUCATION/TRAINING PROGRAM

## 2022-02-07 PROCEDURE — 87040 BLOOD CULTURE FOR BACTERIA: CPT | Performed by: EMERGENCY MEDICINE

## 2022-02-07 PROCEDURE — 99291 CRITICAL CARE FIRST HOUR: CPT | Mod: 25

## 2022-02-07 PROCEDURE — 96361 HYDRATE IV INFUSION ADD-ON: CPT

## 2022-02-07 PROCEDURE — 81000 URINALYSIS NONAUTO W/SCOPE: CPT | Performed by: EMERGENCY MEDICINE

## 2022-02-07 PROCEDURE — 63600175 PHARM REV CODE 636 W HCPCS: Performed by: INTERNAL MEDICINE

## 2022-02-07 PROCEDURE — 82010 KETONE BODYS QUAN: CPT | Performed by: EMERGENCY MEDICINE

## 2022-02-07 PROCEDURE — 99900035 HC TECH TIME PER 15 MIN (STAT)

## 2022-02-07 PROCEDURE — 12000002 HC ACUTE/MED SURGE SEMI-PRIVATE ROOM

## 2022-02-07 PROCEDURE — 63600175 PHARM REV CODE 636 W HCPCS: Performed by: EMERGENCY MEDICINE

## 2022-02-07 PROCEDURE — U0002 COVID-19 LAB TEST NON-CDC: HCPCS | Performed by: EMERGENCY MEDICINE

## 2022-02-07 PROCEDURE — 93010 ELECTROCARDIOGRAM REPORT: CPT | Mod: ,,, | Performed by: INTERNAL MEDICINE

## 2022-02-07 PROCEDURE — 25000003 PHARM REV CODE 250: Performed by: INTERNAL MEDICINE

## 2022-02-07 PROCEDURE — 80053 COMPREHEN METABOLIC PANEL: CPT | Performed by: EMERGENCY MEDICINE

## 2022-02-07 PROCEDURE — 85025 COMPLETE CBC W/AUTO DIFF WBC: CPT | Performed by: EMERGENCY MEDICINE

## 2022-02-07 PROCEDURE — S5010 5% DEXTROSE AND 0.45% SALINE: HCPCS | Performed by: INTERNAL MEDICINE

## 2022-02-07 PROCEDURE — 82803 BLOOD GASES ANY COMBINATION: CPT

## 2022-02-07 PROCEDURE — 93005 ELECTROCARDIOGRAM TRACING: CPT

## 2022-02-07 PROCEDURE — 93010 EKG 12-LEAD: ICD-10-PCS | Mod: ,,, | Performed by: INTERNAL MEDICINE

## 2022-02-07 RX ORDER — ONDANSETRON 2 MG/ML
4 INJECTION INTRAMUSCULAR; INTRAVENOUS
Status: COMPLETED | OUTPATIENT
Start: 2022-02-07 | End: 2022-02-07

## 2022-02-07 RX ORDER — DEXTROSE MONOHYDRATE AND SODIUM CHLORIDE 5; .45 G/100ML; G/100ML
125 INJECTION, SOLUTION INTRAVENOUS CONTINUOUS PRN
Status: DISCONTINUED | OUTPATIENT
Start: 2022-02-07 | End: 2022-02-08

## 2022-02-07 RX ORDER — CEFAZOLIN SODIUM 2 G/50ML
2 SOLUTION INTRAVENOUS
Status: DISCONTINUED | OUTPATIENT
Start: 2022-02-07 | End: 2022-02-10

## 2022-02-07 RX ORDER — ONDANSETRON 2 MG/ML
4 INJECTION INTRAMUSCULAR; INTRAVENOUS
Status: DISCONTINUED | OUTPATIENT
Start: 2022-02-07 | End: 2022-02-07

## 2022-02-07 RX ORDER — ONDANSETRON 2 MG/ML
4 INJECTION INTRAMUSCULAR; INTRAVENOUS EVERY 6 HOURS PRN
Status: DISCONTINUED | OUTPATIENT
Start: 2022-02-07 | End: 2022-02-13 | Stop reason: HOSPADM

## 2022-02-07 RX ORDER — OXYCODONE HYDROCHLORIDE 5 MG/1
5 TABLET ORAL EVERY 8 HOURS PRN
Status: DISCONTINUED | OUTPATIENT
Start: 2022-02-07 | End: 2022-02-13 | Stop reason: HOSPADM

## 2022-02-07 RX ORDER — AMOXICILLIN 250 MG
1 CAPSULE ORAL 2 TIMES DAILY PRN
Status: DISCONTINUED | OUTPATIENT
Start: 2022-02-07 | End: 2022-02-13 | Stop reason: HOSPADM

## 2022-02-07 RX ORDER — ENOXAPARIN SODIUM 100 MG/ML
40 INJECTION SUBCUTANEOUS EVERY 24 HOURS
Status: DISCONTINUED | OUTPATIENT
Start: 2022-02-07 | End: 2022-02-13 | Stop reason: HOSPADM

## 2022-02-07 RX ORDER — TALC
6 POWDER (GRAM) TOPICAL NIGHTLY PRN
Status: DISCONTINUED | OUTPATIENT
Start: 2022-02-07 | End: 2022-02-13 | Stop reason: HOSPADM

## 2022-02-07 RX ORDER — FAMOTIDINE 10 MG/ML
20 INJECTION INTRAVENOUS
Status: COMPLETED | OUTPATIENT
Start: 2022-02-07 | End: 2022-02-07

## 2022-02-07 RX ORDER — ONDANSETRON 4 MG/1
4 TABLET, ORALLY DISINTEGRATING ORAL
Status: COMPLETED | OUTPATIENT
Start: 2022-02-07 | End: 2022-02-07

## 2022-02-07 RX ORDER — ACETAMINOPHEN 325 MG/1
650 TABLET ORAL EVERY 4 HOURS PRN
Status: DISCONTINUED | OUTPATIENT
Start: 2022-02-07 | End: 2022-02-13 | Stop reason: HOSPADM

## 2022-02-07 RX ORDER — SODIUM CHLORIDE 9 MG/ML
125 INJECTION, SOLUTION INTRAVENOUS CONTINUOUS
Status: DISCONTINUED | OUTPATIENT
Start: 2022-02-07 | End: 2022-02-08

## 2022-02-07 RX ORDER — FAMOTIDINE 20 MG/1
20 TABLET, FILM COATED ORAL 2 TIMES DAILY
Status: DISCONTINUED | OUTPATIENT
Start: 2022-02-07 | End: 2022-02-10

## 2022-02-07 RX ORDER — SODIUM CHLORIDE 0.9 % (FLUSH) 0.9 %
10 SYRINGE (ML) INJECTION
Status: DISCONTINUED | OUTPATIENT
Start: 2022-02-07 | End: 2022-02-13 | Stop reason: HOSPADM

## 2022-02-07 RX ORDER — OXYCODONE HYDROCHLORIDE 5 MG/1
10 TABLET ORAL EVERY 8 HOURS PRN
Status: DISCONTINUED | OUTPATIENT
Start: 2022-02-07 | End: 2022-02-13 | Stop reason: HOSPADM

## 2022-02-07 RX ORDER — LOPERAMIDE HYDROCHLORIDE 2 MG/1
2 CAPSULE ORAL
Status: DISCONTINUED | OUTPATIENT
Start: 2022-02-07 | End: 2022-02-13 | Stop reason: HOSPADM

## 2022-02-07 RX ADMIN — INSULIN HUMAN 2 UNITS: 100 INJECTION, SOLUTION PARENTERAL at 10:02

## 2022-02-07 RX ADMIN — OXYCODONE 10 MG: 5 TABLET ORAL at 10:02

## 2022-02-07 RX ADMIN — FAMOTIDINE 20 MG: 10 INJECTION INTRAVENOUS at 06:02

## 2022-02-07 RX ADMIN — ENOXAPARIN SODIUM 40 MG: 40 INJECTION SUBCUTANEOUS at 10:02

## 2022-02-07 RX ADMIN — SODIUM CHLORIDE 125 ML/HR: 0.9 INJECTION, SOLUTION INTRAVENOUS at 10:02

## 2022-02-07 RX ADMIN — ONDANSETRON 4 MG: 4 TABLET, ORALLY DISINTEGRATING ORAL at 06:02

## 2022-02-07 RX ADMIN — IOHEXOL 100 ML: 350 INJECTION, SOLUTION INTRAVENOUS at 08:02

## 2022-02-07 RX ADMIN — DEXTROSE AND SODIUM CHLORIDE 125 ML/HR: 5; .45 INJECTION, SOLUTION INTRAVENOUS at 10:02

## 2022-02-07 RX ADMIN — ONDANSETRON 4 MG: 2 INJECTION INTRAMUSCULAR; INTRAVENOUS at 09:02

## 2022-02-07 RX ADMIN — FAMOTIDINE 20 MG: 20 TABLET ORAL at 10:02

## 2022-02-07 RX ADMIN — SODIUM CHLORIDE 1000 ML: 0.9 INJECTION, SOLUTION INTRAVENOUS at 06:02

## 2022-02-07 NOTE — ED PROVIDER NOTES
"EM PHYSICIAN NOTE  SCRIBE #1 NOTE: I, Brayan Quintanilla, am scribing for, and in the presence of,  Catina Weir MD. I have scribed the following portions of the note - Other sections scribed: HPI, ARNULFO PE.         HPI  This patient presents with a complaint of   Chief Complaint   Patient presents with    Abdominal Pain     Pt reports epigastric pain that started this morning. Denies n/v. Hx of type 1 diabetes and HTN. Non-compliant with meds. Glucose 233 per EMS        HPI: 41 y.o. male with a PMHx of DM I and HTN presents to the ED for abdominal pain. Patient reports upper abdominal pain since 5 AM today. He notes having similar abdominal pain in the past but never sought medical care for it. Patient also endorses associated nausea and chills which also began at 5 AM. Pt states last BM was at 3 PM and was normal. He also notes a "boil" on his right leg that was lanced 2 days ago. He reports the "boil" appears improved, but he continues to have pain and drainage. He states his wound is packed. Compliant with antibiotics. Reports running out of his diabetes and hypertension medications yesterday. Denies taking any medications for pain. Denies history of abdominal surgery. Denies fever, emesis, known allergies, or EtOH use. No other exacerbating or alleviating factors. No other associated symptoms.       The history is provided by the patient.    REVIEW of PMH, SOC History and Family History:  Past Medical History:   Diagnosis Date    Diabetes mellitus     Hypertension      Social history noncontributory  Family history noncontributory  Review of patient's allergies indicates:  No Known Allergies        REVIEW of SYSTEMS  Source: Chadd Alonzo    The nurse's notes and triage vital signs were reviewed.  GENERAL/CONSTITUTIONAL: . SEE HPI.  CARDIOVASCULAR: There is no report of chest pain   RESPIRATORY: There is no report of cough or SOB  GASTROINTESTINAL: . SEE HPI.  MUSCULOSKELETAL:  See HPI  SKIN AND BREASTS:  SEE " "HPI.  HEMATOLOGIC/LYMPHATIC: There is no report of anemia, bleeding or clotting defects. There is no report of anticoagulant use.  The remainder of the ROS is negative.        PHYSICAL EXAMINATION    ED Triage Vitals [02/07/22 1734]   Enc Vitals Group      BP (!) 165/90      Pulse (!) 120      Resp 20      Temp 98.8 °F (37.1 °C)      Temp src Oral      SpO2 98 %      Weight 264 lb      Height 5' 9"      Head Circumference       Peak Flow       Pain Score       Pain Loc       Pain Edu?       Excl. in GC?      Vital signs and Pulse Ox reviewed in clinical context. Abnormalities noted: Tachycardia and hypertension  Pt's level of consciousness is alert, and the patient is in mild distress.  Skin: warm, pink and dry.  Capillary refill is less than 2 seconds.  There is a large cellulitic area over the anterior right thigh.  I removed the packing and was able to express moderate amount purulence discharge.  There is no crepitance or induration.  Mucosa: Dry oral mucosa.  Head and Neck: WNL  Cardiac exam: Mild tachycardia. Heart sounds are normal. No murmurs. 2+ pulses.   Pulmonary exam: unlabored and clear  Abd Exam: soft there is tenderness to the epigastric  Musculoskeletal: no joint tenderness, deformity or swelling. No edema.  Neurologic: GCS: GCS 15; 5 over 5 strength, cranial nerves intact, neck supple       Initial Impression:  Nausea, epigastric pain, abscess  Plan:  Labs, imaging, analgesics and antiemetics, reassess  Catina Weir MD, 5:53 PM 2/7/2022      Medical decision making:   Nurses notes and Vital Signs reviewed.  Orders Placed This Encounter   Procedures    X-Ray Chest AP Portable    CT Abdomen Pelvis With Contrast    Urinalysis, Reflex to Urine Culture Urine, Clean Catch    Comprehensive Metabolic Panel    CBC Auto Differential    Troponin I    Lipase    Beta-Hydroxybutyrate, Serum    Lactic acid, plasma    POCT Venous Blood Gas Once    POCT COVID-19 Rapid Screening    EKG 12-lead    " Saline lock IV       ED Course as of 02/07/22 2048 Mon Feb 07, 2022 1911 [place] [address].  [MH]   1930 CMP reveals a bicarb of 15. Glucose is 276. Anion gap is 17. This is concerning for DKA []   2010 CXR wnl [MH]   2021 Troponin and lipase are normal [MH]   2044 Beta hydroxybutyrate is elevated at 4.8.  Will treat as DKA. []      ED Course User Index  [] Catina Weir MD       Diagnoses that have been ruled out:   None   Diagnoses that are still under consideration:   None   Final diagnoses:   Epigastric pain   Abdominal pain   Thigh abscess     This note was created using Dictation Software.  This program may occasionally misinterpret certain words and phrases.      SCRIBE ATTESTATION NOTE:  I attest that I personally performed the services documented by the scribe and acknowledged and confirm the content of the note.   Nurses notes were reviewed.  Catina Weir        Nurses notes were reviewed.      CRITICAL CARE TIME:  These services included the following: chart data review, reviewing nursing notes and researching old charts from internal and external sources, documentation time, consultant collaboration regarding findings and treatment options, medication orders and management, direct patient care, vital sign assessments, physical exam reassessments, and ordering, interpreting and reviewing diagnostic studies/lab tests.    Aggregate critical care time was approximately 35 minutes, which includes only time during which I was engaged in work directly related to the patient's care, as described above, whether at the bedside or elsewhere in the Emergency Department.  It did not include time spent performing other reported procedures or the services of residents, students, nurses or physician assistants.        Transfer of Care:  I discussed this case with the hospitalist.  Will admit to ICU insulin drip and care DKA.                              Catina Weir MD  02/07/22  2002       [facility] PADMA Weir MD  02/07/22 204

## 2022-02-07 NOTE — LETTER
February 13, 2022         [place] [address]  Phone: [phone]       Patient: Chadd Alonzo   YOB: 1980  Date of Visit: 02/13/2022    To Whom It May Concern:    Charly Alonzo  was admitted to  [facility] from 02/08/2022 through 02/13/2022.  He may return to work on 02/16/2022 with no restrictions. If you have any questions or concerns, or if I can be of further assistance, please do not hesitate to contact me.    Sincerely,    Mendez Encinas MD

## 2022-02-08 PROBLEM — A41.01 SEPSIS DUE TO METHICILLIN SUSCEPTIBLE STAPHYLOCOCCUS AUREUS (MSSA) WITHOUT ACUTE ORGAN DYSFUNCTION: Status: ACTIVE | Noted: 2022-02-08

## 2022-02-08 PROBLEM — A41.9 SEPSIS: Status: RESOLVED | Noted: 2022-02-07 | Resolved: 2022-02-08

## 2022-02-08 LAB
ANION GAP SERPL CALC-SCNC: 10 MMOL/L (ref 8–16)
ANION GAP SERPL CALC-SCNC: 14 MMOL/L (ref 8–16)
ANION GAP SERPL CALC-SCNC: 15 MMOL/L (ref 8–16)
B-OH-BUTYR BLD STRIP-SCNC: 2.6 MMOL/L (ref 0–0.5)
BASOPHILS # BLD AUTO: 0.03 K/UL (ref 0–0.2)
BASOPHILS NFR BLD: 0.2 % (ref 0–1.9)
BUN SERPL-MCNC: 6 MG/DL (ref 6–20)
BUN SERPL-MCNC: 6 MG/DL (ref 6–20)
BUN SERPL-MCNC: 7 MG/DL (ref 6–20)
CALCIUM SERPL-MCNC: 9 MG/DL (ref 8.7–10.5)
CALCIUM SERPL-MCNC: 9.2 MG/DL (ref 8.7–10.5)
CALCIUM SERPL-MCNC: 9.2 MG/DL (ref 8.7–10.5)
CHLORIDE SERPL-SCNC: 107 MMOL/L (ref 95–110)
CHLORIDE SERPL-SCNC: 109 MMOL/L (ref 95–110)
CHLORIDE SERPL-SCNC: 110 MMOL/L (ref 95–110)
CO2 SERPL-SCNC: 15 MMOL/L (ref 23–29)
CO2 SERPL-SCNC: 16 MMOL/L (ref 23–29)
CO2 SERPL-SCNC: 17 MMOL/L (ref 23–29)
CREAT SERPL-MCNC: 0.8 MG/DL (ref 0.5–1.4)
DIFFERENTIAL METHOD: ABNORMAL
EOSINOPHIL # BLD AUTO: 0 K/UL (ref 0–0.5)
EOSINOPHIL NFR BLD: 0 % (ref 0–8)
ERYTHROCYTE [DISTWIDTH] IN BLOOD BY AUTOMATED COUNT: 13.2 % (ref 11.5–14.5)
EST. GFR  (AFRICAN AMERICAN): >60 ML/MIN/1.73 M^2
EST. GFR  (NON AFRICAN AMERICAN): >60 ML/MIN/1.73 M^2
GLUCOSE SERPL-MCNC: 176 MG/DL (ref 70–110)
GLUCOSE SERPL-MCNC: 200 MG/DL (ref 70–110)
GLUCOSE SERPL-MCNC: 203 MG/DL (ref 70–110)
GLUCOSE SERPL-MCNC: 206 MG/DL (ref 70–110)
GLUCOSE SERPL-MCNC: 208 MG/DL (ref 70–110)
GLUCOSE SERPL-MCNC: 221 MG/DL (ref 70–110)
GLUCOSE SERPL-MCNC: 235 MG/DL (ref 70–110)
GLUCOSE SERPL-MCNC: 271 MG/DL (ref 70–110)
GLUCOSE SERPL-MCNC: 274 MG/DL (ref 70–110)
HCT VFR BLD AUTO: 38.1 % (ref 40–54)
HGB BLD-MCNC: 12.4 G/DL (ref 14–18)
IMM GRANULOCYTES # BLD AUTO: 0.15 K/UL (ref 0–0.04)
IMM GRANULOCYTES NFR BLD AUTO: 0.8 % (ref 0–0.5)
LYMPHOCYTES # BLD AUTO: 1.3 K/UL (ref 1–4.8)
LYMPHOCYTES NFR BLD: 6.5 % (ref 18–48)
MAGNESIUM SERPL-MCNC: 1.7 MG/DL (ref 1.6–2.6)
MCH RBC QN AUTO: 28.8 PG (ref 27–31)
MCHC RBC AUTO-ENTMCNC: 32.5 G/DL (ref 32–36)
MCV RBC AUTO: 88 FL (ref 82–98)
MONOCYTES # BLD AUTO: 1.1 K/UL (ref 0.3–1)
MONOCYTES NFR BLD: 5.4 % (ref 4–15)
NEUTROPHILS # BLD AUTO: 17 K/UL (ref 1.8–7.7)
NEUTROPHILS NFR BLD: 87.1 % (ref 38–73)
NRBC BLD-RTO: 0 /100 WBC
PHOSPHATE SERPL-MCNC: 2.7 MG/DL (ref 2.7–4.5)
PLATELET # BLD AUTO: 397 K/UL (ref 150–450)
PMV BLD AUTO: 8.9 FL (ref 9.2–12.9)
POCT GLUCOSE: 194 MG/DL (ref 70–110)
POCT GLUCOSE: 197 MG/DL (ref 70–110)
POCT GLUCOSE: 200 MG/DL (ref 70–110)
POCT GLUCOSE: 208 MG/DL (ref 70–110)
POCT GLUCOSE: 221 MG/DL (ref 70–110)
POCT GLUCOSE: 221 MG/DL (ref 70–110)
POCT GLUCOSE: 235 MG/DL (ref 70–110)
POCT GLUCOSE: 241 MG/DL (ref 70–110)
POCT GLUCOSE: 244 MG/DL (ref 70–110)
POCT GLUCOSE: 248 MG/DL (ref 70–110)
POCT GLUCOSE: 264 MG/DL (ref 70–110)
POCT GLUCOSE: 272 MG/DL (ref 70–110)
POCT GLUCOSE: 285 MG/DL (ref 70–110)
POTASSIUM SERPL-SCNC: 4.1 MMOL/L (ref 3.5–5.1)
POTASSIUM SERPL-SCNC: 4.2 MMOL/L (ref 3.5–5.1)
POTASSIUM SERPL-SCNC: 4.5 MMOL/L (ref 3.5–5.1)
RBC # BLD AUTO: 4.31 M/UL (ref 4.6–6.2)
SODIUM SERPL-SCNC: 137 MMOL/L (ref 136–145)
SODIUM SERPL-SCNC: 137 MMOL/L (ref 136–145)
SODIUM SERPL-SCNC: 139 MMOL/L (ref 136–145)
WBC # BLD AUTO: 19.54 K/UL (ref 3.9–12.7)

## 2022-02-08 PROCEDURE — 63600175 PHARM REV CODE 636 W HCPCS: Performed by: INTERNAL MEDICINE

## 2022-02-08 PROCEDURE — 80048 BASIC METABOLIC PNL TOTAL CA: CPT | Mod: 91 | Performed by: INTERNAL MEDICINE

## 2022-02-08 PROCEDURE — 80048 BASIC METABOLIC PNL TOTAL CA: CPT | Performed by: INTERNAL MEDICINE

## 2022-02-08 PROCEDURE — 25000003 PHARM REV CODE 250: Performed by: INTERNAL MEDICINE

## 2022-02-08 PROCEDURE — 84100 ASSAY OF PHOSPHORUS: CPT | Performed by: INTERNAL MEDICINE

## 2022-02-08 PROCEDURE — 82010 KETONE BODYS QUAN: CPT | Performed by: INTERNAL MEDICINE

## 2022-02-08 PROCEDURE — C9399 UNCLASSIFIED DRUGS OR BIOLOG: HCPCS | Performed by: INTERNAL MEDICINE

## 2022-02-08 PROCEDURE — 85025 COMPLETE CBC W/AUTO DIFF WBC: CPT | Performed by: INTERNAL MEDICINE

## 2022-02-08 PROCEDURE — 21400001 HC TELEMETRY ROOM

## 2022-02-08 PROCEDURE — 83735 ASSAY OF MAGNESIUM: CPT | Performed by: INTERNAL MEDICINE

## 2022-02-08 RX ORDER — INSULIN ASPART 100 [IU]/ML
5 INJECTION, SOLUTION INTRAVENOUS; SUBCUTANEOUS
Status: DISCONTINUED | OUTPATIENT
Start: 2022-02-08 | End: 2022-02-09

## 2022-02-08 RX ORDER — INSULIN ASPART 100 [IU]/ML
1-10 INJECTION, SOLUTION INTRAVENOUS; SUBCUTANEOUS
Status: DISCONTINUED | OUTPATIENT
Start: 2022-02-08 | End: 2022-02-13 | Stop reason: HOSPADM

## 2022-02-08 RX ORDER — IBUPROFEN 200 MG
24 TABLET ORAL
Status: DISCONTINUED | OUTPATIENT
Start: 2022-02-08 | End: 2022-02-13 | Stop reason: HOSPADM

## 2022-02-08 RX ORDER — GLUCAGON 1 MG
1 KIT INJECTION
Status: DISCONTINUED | OUTPATIENT
Start: 2022-02-08 | End: 2022-02-13 | Stop reason: HOSPADM

## 2022-02-08 RX ORDER — IBUPROFEN 200 MG
16 TABLET ORAL
Status: DISCONTINUED | OUTPATIENT
Start: 2022-02-08 | End: 2022-02-13 | Stop reason: HOSPADM

## 2022-02-08 RX ADMIN — FAMOTIDINE 20 MG: 20 TABLET ORAL at 08:02

## 2022-02-08 RX ADMIN — SODIUM CHLORIDE 2 UNITS/HR: 9 INJECTION, SOLUTION INTRAVENOUS at 01:02

## 2022-02-08 RX ADMIN — INSULIN ASPART 4 UNITS: 100 INJECTION, SOLUTION INTRAVENOUS; SUBCUTANEOUS at 11:02

## 2022-02-08 RX ADMIN — CEFAZOLIN SODIUM 2 G: 2 SOLUTION INTRAVENOUS at 11:02

## 2022-02-08 RX ADMIN — OXYCODONE 10 MG: 5 TABLET ORAL at 08:02

## 2022-02-08 RX ADMIN — INSULIN ASPART 2 UNITS: 100 INJECTION, SOLUTION INTRAVENOUS; SUBCUTANEOUS at 08:02

## 2022-02-08 RX ADMIN — CEFAZOLIN SODIUM 2 G: 2 SOLUTION INTRAVENOUS at 04:02

## 2022-02-08 RX ADMIN — SENNOSIDES AND DOCUSATE SODIUM 1 TABLET: 50; 8.6 TABLET ORAL at 08:02

## 2022-02-08 RX ADMIN — INSULIN DETEMIR 15 UNITS: 100 INJECTION, SOLUTION SUBCUTANEOUS at 08:02

## 2022-02-08 RX ADMIN — CEFAZOLIN SODIUM 2 G: 2 SOLUTION INTRAVENOUS at 07:02

## 2022-02-08 RX ADMIN — INSULIN ASPART 5 UNITS: 100 INJECTION, SOLUTION INTRAVENOUS; SUBCUTANEOUS at 11:02

## 2022-02-08 RX ADMIN — ONDANSETRON 4 MG: 2 INJECTION INTRAMUSCULAR; INTRAVENOUS at 08:02

## 2022-02-08 RX ADMIN — INSULIN ASPART 5 UNITS: 100 INJECTION, SOLUTION INTRAVENOUS; SUBCUTANEOUS at 03:02

## 2022-02-08 RX ADMIN — CEFAZOLIN SODIUM 2 G: 2 SOLUTION INTRAVENOUS at 12:02

## 2022-02-08 RX ADMIN — INSULIN ASPART 4 UNITS: 100 INJECTION, SOLUTION INTRAVENOUS; SUBCUTANEOUS at 03:02

## 2022-02-08 RX ADMIN — ENOXAPARIN SODIUM 40 MG: 40 INJECTION SUBCUTANEOUS at 04:02

## 2022-02-08 RX ADMIN — ONDANSETRON 4 MG: 2 INJECTION INTRAMUSCULAR; INTRAVENOUS at 12:02

## 2022-02-08 NOTE — CONSULTS
[facility]  Wound Care    Patient Name:  Chadd Alonzo   MRN:  [id number]  Date: 2/8/2022  Diagnosis: Diabetic ketoacidosis without coma associated with type 2 diabetes mellitus    History:     Past Medical History:   Diagnosis Date    Diabetes mellitus     Hypertension        Social History     Socioeconomic History    Marital status:    Tobacco Use    Smoking status: Never Smoker    Smokeless tobacco: Never Used   Substance and Sexual Activity    Alcohol use: No    Drug use: No       Precautions:     Allergies as of 02/07/2022    (No Known Allergies)       [facility] Assessment Details/Treatment   Consulted for I&D right upper anterior thigh  A 41 year old male admitted 2/7/22 from home with diabetic ketoacidosis with DM II; abscess of right thigh; sepsis due to MSSA  2/8 WBC 19.54 Hgb 12.4 Hct 38.1  2/7 Alb 2.7  2/5 I&D right upper thigh boil at [facility]  On Isoflex mattress; Chris score 21  Assessment:  Photodocumentation    Right anterior upper thigh- 1.5 cm I&D site with scant amount purulence when palpated. Indurated 7 cm surrounding incision. Painful to touch.   Treatment:  Cleansed I&D site with Vashe. Applied Vashe moistened gauze to wound and secured with Medipore tape.   Nursing to get K-Pad for warm moist heat to site.   Orders placed.     02/08/2022

## 2022-02-08 NOTE — PHARMACY MED REC
"  Admission Medication History     The home medication history was taken by Rosaura Schilling CPhT.      You may go to "Admission" then "Reconcile Home Medications" tabs to review and/or act upon these items.      The home medication list has been updated by the Pharmacy department.    Please read ALL comments highlighted in yellow.    Please address this information as you see fit.     Feel free to contact us if you have any questions or require assistance.      The medications listed below were removed from the home medication list. Please reorder if appropriate:  Patient reports no longer taking the following medication(s):   Prilosec   Zofran 4 mg   Zofran 8 mg   Glycolax         Medications listed below were obtained from: Patient/family and Analytic software- [employer]  (Not in a hospital admission)      Potential issues to be addressed PRIOR TO DISCHARGE  Please discuss with the patient barriers to adherence with medication treatment plans     Patient stated that he was taking Amlodipine 5 mg last taken on Thursday but does not have anymore.  Could not find history of this medication in EHR.      Patient was prescribed Pecocet 5-325 mg but is not taking this medication.  This medication remains on Home Medication List due to being a critical medication.      Rosaura Schilling CPhT.  [phone]                  .          "

## 2022-02-08 NOTE — ASSESSMENT & PLAN NOTE
His glucose is only mildly elevated, likely from N/V and anorexia from starting Clindamycin  Will treat as euglycemic DKA, as BHB was 4.8   -May be component of starvation ketosis from poor PO, but suspect sepsis has induced DKA  I have initiated the DKA best practice pathway order sets  BMP q4 hours  NPO except meds

## 2022-02-08 NOTE — ASSESSMENT & PLAN NOTE
He underwent I&D at [place] on 2/5  The abscess area has sealed over  The CT shows no mention of pus or fasciitis  Will get local US to see if pus pocket is present   -If so, will consult Gen Surgery in am

## 2022-02-08 NOTE — H&P
[facility] [place] [facility] Medicine  History & Physical    Patient Name: Chadd Alnozo  MRN: [id number]  Patient Class: IP- Inpatient  Admission Date: 2/7/2022  Attending Physician: Wayne Hernandez MD   Primary Care Provider: Primary Doctor No         Patient information was obtained from patient, past medical records and ER records.     Subjective:     Principal Problem:Diabetic ketoacidosis without coma associated with type 2 diabetes mellitus    Chief Complaint:   Chief Complaint   Patient presents with    Abdominal Pain     Pt reports epigastric pain that started this morning. Denies n/v. Hx of type 1 diabetes and HTN. Non-compliant with meds. Glucose 233 per EMS         HPI: Mr. Alonzo is a 40yo man with a past medical history of DM2 x 19 years only on Metformin, and HTN.  He got his covid vaccinations, but is due for a booster.    He now comes to the ED with a boil which started on his right upper thigh this past Thursday (4 days ago).  He went to [facility] [place] on 2/5 and underwent an I&D and was discharged on Clindamycin.  His cultures from that day ultimately grew out MSSA.  He states that since starting the Clindamycin he has had epigastric discomfort and N/V.  He now comes mostly due to progressive malaise, epigastric pain and nausea.  He denies fever or chills.  He does state that despite taking his Clindamycin, his right thigh has remained unchanged (still red, indurated and painful).    In the ED here he was found to have continued right thigh/groin cellulitis (no fasciitis on CT), sepsis, and DKA.  He was given Vanco and Zosyn, then transitioned to Ancef due to known MSSA on CXS.      Past Medical History:   Diagnosis Date    Diabetes mellitus     Hypertension        History reviewed. No pertinent surgical history.    Review of patient's allergies indicates:  No Known Allergies    No current facility-administered medications on file prior to encounter.     Current Outpatient Medications on  File Prior to Encounter   Medication Sig    acetaminophen (TYLENOL) 500 MG tablet Take 1 tablet (500 mg total) by mouth every 6 (six) hours as needed for Pain (As needed for pain and fever).    clindamycin (CLEOCIN) 150 MG capsule Take 2 capsules (300 mg total) by mouth 4 (four) times daily. for 10 days    ibuprofen (ADVIL,MOTRIN) 600 MG tablet Take 1 tablet (600 mg total) by mouth every 6 (six) hours as needed for Pain (Take with food as needed for mild-to-moderate pain).    LIDOcaine HCl 2% (XYLOCAINE) 2 % Soln by Mucous Membrane route every 3 (three) hours as needed (As needed for sore throat). Apply 1 tsp to painful area every 3 hr as needed for pain    metFORMIN (GLUCOPHAGE) 500 MG tablet Take 500 mg by mouth 2 (two) times daily with meals.    omeprazole (PRILOSEC) 20 MG capsule Take 1 capsule (20 mg total) by mouth once daily. for 20 days    ondansetron (ZOFRAN) 4 MG tablet Take 1 tablet (4 mg total) by mouth every 6 (six) hours.    ondansetron (ZOFRAN-ODT) 8 MG TbDL Take 1 tablet (8 mg total) by mouth every 6 (six) hours as needed.    oxyCODONE-acetaminophen (PERCOCET) 5-325 mg per tablet Take 1 tablet by mouth every 8 (eight) hours as needed for Pain (As needed for severe 10/10 pain).    polyethylene glycol (GLYCOLAX) 17 gram PwPk Take 17 g by mouth once daily.     Family History    None       Tobacco Use    Smoking status: Never Smoker    Smokeless tobacco: Never Used   Substance and Sexual Activity    Alcohol use: No    Drug use: No    Sexual activity: Not on file     Review of Systems   Constitutional: Positive for activity change and appetite change. Negative for chills, fatigue and fever.   HENT: Negative for congestion.    Eyes: Negative for photophobia.   Respiratory: Negative for cough and shortness of breath.    Cardiovascular: Negative for chest pain.   Gastrointestinal: Positive for nausea and vomiting. Negative for abdominal pain, constipation and diarrhea.   Endocrine: Negative  for heat intolerance.   Genitourinary: Negative for dysuria.   Musculoskeletal: Negative for myalgias.   Skin: Positive for color change, rash and wound.   Allergic/Immunologic: Negative for immunocompromised state.   Neurological: Negative for dizziness and weakness.   Hematological: Does not bruise/bleed easily.   Psychiatric/Behavioral: Negative for confusion. The patient is not nervous/anxious.      Objective:     Vital Signs (Most Recent):  Temp: 98.8 °F (37.1 °C) (02/07/22 1734)  Pulse: (!) 116 (02/07/22 1932)  Resp: 20 (02/07/22 2233)  BP: (!) 187/100 (02/07/22 1932)  SpO2: 100 % (02/07/22 1932) Vital Signs (24h Range):  Temp:  [98.8 °F (37.1 °C)] 98.8 °F (37.1 °C)  Pulse:  [113-120] 116  Resp:  [14-20] 20  SpO2:  [98 %-100 %] 100 %  BP: (165-187)/() 187/100     Weight: 119.7 kg (264 lb)  Body mass index is 38.99 kg/m².    Physical Exam  Vitals and nursing note reviewed.   Constitutional:       General: He is not in acute distress.     Appearance: He is well-developed and well-nourished. He is ill-appearing. He is not toxic-appearing or diaphoretic.   HENT:      Head: Normocephalic and atraumatic.      Nose: Nose normal.      Mouth/Throat:      Mouth: Oropharynx is clear and moist.      Pharynx: No oropharyngeal exudate.   Eyes:      General: No scleral icterus.        Right eye: No discharge.         Left eye: No discharge.      Extraocular Movements: EOM normal.      Conjunctiva/sclera: Conjunctivae normal.      Pupils: Pupils are equal, round, and reactive to light.   Neck:      Thyroid: No thyromegaly.      Vascular: No JVD.      Trachea: No tracheal deviation.   Cardiovascular:      Rate and Rhythm: Regular rhythm. Tachycardia present.      Pulses: Intact distal pulses.      Heart sounds: Normal heart sounds. No murmur heard.  No friction rub. No gallop.    Pulmonary:      Effort: Pulmonary effort is normal. No tachypnea, bradypnea or respiratory distress.      Breath sounds: Normal breath sounds.  No stridor. No decreased breath sounds, wheezing, rhonchi or rales.   Chest:      Chest wall: No tenderness.   Abdominal:      General: Bowel sounds are normal. There is no distension.      Palpations: Abdomen is soft. There is no mass.      Tenderness: There is no abdominal tenderness. There is no guarding or rebound.   Genitourinary:     Comments: No phillips in place  Musculoskeletal:         General: No tenderness or edema. Normal range of motion.      Cervical back: Normal range of motion and neck supple.   Lymphadenopathy:      Cervical: No cervical adenopathy.      Comments: Right leg edema    Skin:     General: Skin is warm and dry.      Coloration: Skin is not pale.      Findings: Erythema, lesion and rash present.      Comments: Right upper thigh with a quarter sized area of prior I&D.  There is intense erythema and induration extending about the upper groin, thigh and leg.  This does not extend to the genital area, but close.  The area of prior abscess is closed now with no drainage of packing.  It his highly indurated, so I cannot tell if there is pus underneath it or not.   Neurological:      Mental Status: He is alert and oriented to person, place, and time.      GCS: GCS eye subscore is 4. GCS verbal subscore is 5. GCS motor subscore is 6.      Cranial Nerves: No cranial nerve deficit.   Psychiatric:         Attention and Perception: Attention and perception normal.         Mood and Affect: Mood and affect normal.         Behavior: Behavior normal.         Thought Content: Thought content normal.         Cognition and Memory: Cognition and memory normal.         Judgment: Judgment normal.           CRANIAL NERVES     CN III, IV, VI   Pupils are equal, round, and reactive to light.  Extraocular motions are normal.        Significant Labs:   Recent Results (from the past 24 hour(s))   Comprehensive Metabolic Panel    Collection Time: 02/07/22  6:38 PM   Result Value Ref Range    Sodium 136 136 - 145 mmol/L     Potassium 4.5 3.5 - 5.1 mmol/L    Chloride 104 95 - 110 mmol/L    CO2 15 (L) 23 - 29 mmol/L    Glucose 276 (H) 70 - 110 mg/dL    BUN 8 6 - 20 mg/dL    Creatinine 0.9 0.5 - 1.4 mg/dL    Calcium 9.6 8.7 - 10.5 mg/dL    Total Protein 8.3 6.0 - 8.4 g/dL    Albumin 2.7 (L) 3.5 - 5.2 g/dL    Total Bilirubin 0.3 0.1 - 1.0 mg/dL    Alkaline Phosphatase 109 55 - 135 U/L    AST 11 10 - 40 U/L    ALT 15 10 - 44 U/L    Anion Gap 17 (H) 8 - 16 mmol/L    eGFR if African American >60 >60 mL/min/1.73 m^2    eGFR if non African American >60 >60 mL/min/1.73 m^2   CBC Auto Differential    Collection Time: 02/07/22  6:38 PM   Result Value Ref Range    WBC 20.04 (H) 3.90 - 12.70 K/uL    RBC 4.75 4.60 - 6.20 M/uL    Hemoglobin 13.6 (L) 14.0 - 18.0 g/dL    Hematocrit 42.6 40.0 - 54.0 %    MCV 90 82 - 98 fL    MCH 28.6 27.0 - 31.0 pg    MCHC 31.9 (L) 32.0 - 36.0 g/dL    RDW 13.3 11.5 - 14.5 %    Platelets 415 150 - 450 K/uL    MPV 9.5 9.2 - 12.9 fL    Immature Granulocytes 0.8 (H) 0.0 - 0.5 %    Gran # (ANC) 18.4 (H) 1.8 - 7.7 K/uL    Immature Grans (Abs) 0.17 (H) 0.00 - 0.04 K/uL    Lymph # 0.7 (L) 1.0 - 4.8 K/uL    Mono # 0.8 0.3 - 1.0 K/uL    Eos # 0.0 0.0 - 0.5 K/uL    Baso # 0.02 0.00 - 0.20 K/uL    nRBC 0 0 /100 WBC    Gran % 91.9 (H) 38.0 - 73.0 %    Lymph % 3.4 (L) 18.0 - 48.0 %    Mono % 3.8 (L) 4.0 - 15.0 %    Eosinophil % 0.0 0.0 - 8.0 %    Basophil % 0.1 0.0 - 1.9 %    Differential Method Automated    Troponin I    Collection Time: 02/07/22  6:38 PM   Result Value Ref Range    Troponin I <0.006 0.000 - 0.026 ng/mL   Lipase    Collection Time: 02/07/22  6:38 PM   Result Value Ref Range    Lipase 29 4 - 60 U/L   POCT COVID-19 Rapid Screening    Collection Time: 02/07/22  7:15 PM   Result Value Ref Range    POC Rapid COVID Negative Negative     Acceptable Yes    Beta-Hydroxybutyrate, Serum    Collection Time: 02/07/22  8:09 PM   Result Value Ref Range    Beta-Hydroxybutyrate 4.8 (H) 0.0 - 0.5 mmol/L   Lactic  acid, plasma    Collection Time: 02/07/22  8:09 PM   Result Value Ref Range    Lactate (Lactic Acid) 1.2 0.5 - 2.2 mmol/L   Urinalysis, Reflex to Urine Culture Urine, Clean Catch    Collection Time: 02/07/22  8:11 PM    Specimen: Urine   Result Value Ref Range    Specimen UA Urine, Clean Catch     Color, UA Yellow Yellow, Straw, Meliza    Appearance, UA Clear Clear    pH, UA 6.0 5.0 - 8.0    Specific Gravity, UA >1.030 (A) 1.005 - 1.030    Protein, UA 1+ (A) Negative    Glucose, UA 4+ (A) Negative    Ketones, UA 3+ (A) Negative    Bilirubin (UA) Negative Negative    Occult Blood UA Negative Negative    Nitrite, UA Negative Negative    Urobilinogen, UA Negative <2.0 EU/dL    Leukocytes, UA Negative Negative   Urinalysis Microscopic    Collection Time: 02/07/22  8:11 PM   Result Value Ref Range    RBC, UA 2 0 - 4 /hpf    WBC, UA 4 0 - 5 /hpf    Bacteria Occasional None-Occ /hpf    Yeast, UA None None    Squam Epithel, UA 2 /hpf    Hyaline Casts, UA 0 0-1/lpf /lpf    Microscopic Comment SEE COMMENT    ISTAT PROCEDURE    Collection Time: 02/07/22  9:08 PM   Result Value Ref Range    POC PH 7.274 (L) 7.35 - 7.45    POC PCO2 35.9 35 - 45 mmHg    POC PO2 24 (L) 40 - 60 mmHg    POC HCO3 16.6 (L) 24 - 28 mmol/L    POC BE -9 -2 to 2 mmol/L    POC SATURATED O2 37 (L) 95 - 100 %    POC TCO2 18 (L) 24 - 29 mmol/L    Sample VENOUS     Site Other     Allens Test N/A     DelSys Room Air    POCT Glucose, Hand-Held Device    Collection Time: 02/07/22  9:46 PM   Result Value Ref Range    POC Glucose 224 (A) 70 - 110 MG/DL     Aerobic culture (Specify Source) **CANNOT BE ORDERED AS STAT  Order: [id number]   Status: Final result     Visible to patient: No (inaccessible in Patient Portal)     Next appt: None    Specimen Information: Leg, Right; Abscess         1 Result Note    Component 2 d ago   Aerobic Bacterial Culture  Abnormal   STAPHYLOCOCCUS AUREUS   Many     Resulting Agency [facility]          Susceptibility     Staphylococcus aureus      CULTURE, AEROBIC  (SPECIFY SOURCE)     Clindamycin <=0.5 mcg/mL Sensitive     Erythromycin >4 mcg/mL Resistant     Oxacillin 0.5 mcg/mL Sensitive     Penicillin >8 mcg/mL Resistant     Tetracycline <=4 mcg/mL Sensitive     Trimeth/Sulfa <=0.5/9.5 m... Sensitive     Vancomycin 2 mcg/mL Sensitive               Linear View         Specimen Collected: 02/05/22 11:21 Last Resulted: 02/07/22 08:28             Significant Imaging:   XR CHEST AP PORTABLE   FINDINGS:  Cardiac wires overlie the chest.  Cardiomediastinal silhouette is stable.  Lung volumes are low, accentuating basilar markings.  No confluent area of consolidation.  No sizable pleural effusion.  No pneumothorax.     Impression:   No acute process or detrimental change when compared with 02/05/2022.      Electronically signed by: Nicholas Luna MD  Date:                                            02/07/2022  Time:                                           19:53    CT OF ABDOMEN PELVIS WITH   FINDINGS:  The liver is mildly enlarged.  No intrahepatic biliary ductal dilatation is detected.   Spleen, pancreas, kidneys, and right adrenal gland are unremarkable. The gallbladder contains no calcified gallstones.   There is a stable indeterminate left adrenal nodule.   There is no definite evidence for abdominal adenopathy or ascites.  There are multiple subcentimeter retroperitoneal lymph nodes in the periaortic region..  9 x 7 mm rounded hyperdensity or calcification is seen in the umbilicus.   Subcutaneous fat infiltration overlying the right anterolateral hip and right upper thigh is seen.  Right inguinal adenopathy is present.  There are no pelvic masses.  The appendix is not inflamed.   There is no free fluid in the pelvis.   There is mild bibasilar atelectasis.  There is asymmetrical elevation the right hemidiaphragm.   Small hiatal hernia is present.   At L5/S1, there is grade 1 retrolisthesis and significant degenerative change.     Impression:   Right  inguinal adenopathy.  Infiltration of the subcutaneous fat overlying the right anterolateral hip and upper thigh, which may be related to cellulitis.   Mild hepatomegaly.   Stable left adrenal nodule.   Small hiatal hernia.      Electronically signed by: Sierra Bell  Date:                                            02/07/2022  Time:                                           21:17    07-FEB-2022 18:20:03 EKG   Sinus tachycardia  Vent. rate 112 BPM  FL interval 144 ms  QRS duration 88 ms  QT/QTc 334/455 ms  Otherwise normal ECG  When compared with ECG of 05-FEB-2022 14:13,  No significant change was found    Assessment/Plan:     * Diabetic ketoacidosis without coma associated with type 2 diabetes mellitus  His glucose is only mildly elevated, likely from N/V and anorexia from starting Clindamycin  Will treat as euglycemic DKA, as BHB was 4.8   -May be component of starvation ketosis from poor PO, but suspect sepsis has induced DKA  I have initiated the DKA best practice pathway order sets  BMP q4 hours  NPO except meds      Abscess of right thigh  He underwent I&D at [place] on 2/5  The abscess area has sealed over  The CT shows no mention of pus or fasciitis  Will get local US to see if pus pocket is present   -If so, will consult Gen Surgery in am      Sepsis due to methicillin susceptible Staphylococcus aureus (MSSA) without acute organ dysfunction  This patient does have evidence of infective focus  My overall impression is sepsis. Vital signs were reviewed and noted in progress note.  Antibiotics given-   In ED he got Zosyn and Vanco    Antibiotics (From admission, onward)            Start     Stop Route Frequency Ordered    02/07/22 2330  cefazolin (ANCEF) 2 gram in dextrose 5% 50 mL IVPB (premix)         -- IV Every 8 hours (non-standard times) 02/07/22 2225        Cultures were taken-   Microbiology Results (last 7 days)     Procedure Component Value Units Date/Time    Blood Culture #2 **CANNOT BE ORDERED  STAT** [[id number]] Collected: 02/07/22 2248    Order Status: Sent Specimen: Blood from Peripheral, Forearm, Left Updated: 02/07/22 2255    Blood Culture #1 **CANNOT BE ORDERED STAT** [[id number]] Collected: 02/07/22 2247    Order Status: Sent Specimen: Blood from Peripheral, Forearm, Left Updated: 02/07/22 2255        Latest lactate reviewed, they are-  Recent Labs   Lab 02/07/22 2009   LACTATE 1.2       Organ dysfunction indicated by DKA  Source- Right thigh abscess and cellulitis to leg    Source control Achieved by- IV antibiotics, US to evaluate for pus pocket needing to be drained      Essential hypertension  He is not currently being treated with medications for this  Holding initiation of therapy until sepsis resolved          VTE Risk Mitigation (From admission, onward)         Ordered     enoxaparin injection 40 mg  Daily         02/07/22 2103     IP VTE HIGH RISK PATIENT  Once         02/07/22 2103     Place WOLF hose  Until discontinued         02/07/22 2103     Place sequential compression device  Until discontinued         02/07/22 2103               Critical care time spent on the evaluation and treatment of severe organ dysfunction, review of pertinent labs and imaging studies, discussions with consulting providers and discussions with patient/family: 70 minutes.    SHAUN Dalal MD  Department of Hospital Medicine   [facility] - Emergency Dept

## 2022-02-08 NOTE — ED NOTES
MD at pt bedside. Pt current BG is 224. MD stated to hold insulin bolus and drip and start D5W in .45%NS until BG is over 250.

## 2022-02-08 NOTE — ED NOTES
Spoke with MD about insulin titration drip. MD stated that pt symptoms has resolved and has requested all drips be stopped. MD stated she will order another med for pt and come down and reevaluate the pt. Meds have been stopped.

## 2022-02-08 NOTE — PLAN OF CARE
Problem: Adult Inpatient Plan of Care  Goal: Optimal Comfort and Wellbeing  Outcome: Ongoing, Progressing  Intervention: Provide Person-Centered Care  Flowsheets (Taken 2/8/2022 1527)  Trust Relationship/Rapport:   care explained   questions answered   questions encouraged   choices provided   reassurance provided   emotional support provided     Problem: Diabetes Comorbidity  Goal: Blood Glucose Level Within Targeted Range  Outcome: Ongoing, Progressing  Intervention: Monitor and Manage Glycemia  Flowsheets (Taken 2/8/2022 1527)  Glycemic Management: blood glucose monitored

## 2022-02-08 NOTE — CARE UPDATE
"I personally evaluated Mr Alonzo today. Presents with diabetic ketoacidosis. Also with a right thigh abscess which was drained prior to this admit and was on clindamycin. Transitioned to SQ insulin. Patient ran out of metformin and has not picked up a refill stating " I thought my medicaid ran out. I have to get a primary". I discussed that it is possible he will need insulin at discharge. Right thigh abscess with scan pus when pressed but otherwise no induration and very mild erythema. I agree with cefazolin for now. F/u on HgbA1c. Adjust insulin if needed for goal glucose 140-180. Consult SW. Might need teaching for insulin injection if needed at discharge    Addendum: glucose still high. Will increase SQ insulin regimen. Will ask nurse to provide insulin teaching since it is very likely he will need insulin at discharge.   "

## 2022-02-08 NOTE — SUBJECTIVE & OBJECTIVE
Past Medical History:   Diagnosis Date    Diabetes mellitus     Hypertension        History reviewed. No pertinent surgical history.    Review of patient's allergies indicates:  No Known Allergies    No current facility-administered medications on file prior to encounter.     Current Outpatient Medications on File Prior to Encounter   Medication Sig    acetaminophen (TYLENOL) 500 MG tablet Take 1 tablet (500 mg total) by mouth every 6 (six) hours as needed for Pain (As needed for pain and fever).    clindamycin (CLEOCIN) 150 MG capsule Take 2 capsules (300 mg total) by mouth 4 (four) times daily. for 10 days    ibuprofen (ADVIL,MOTRIN) 600 MG tablet Take 1 tablet (600 mg total) by mouth every 6 (six) hours as needed for Pain (Take with food as needed for mild-to-moderate pain).    LIDOcaine HCl 2% (XYLOCAINE) 2 % Soln by Mucous Membrane route every 3 (three) hours as needed (As needed for sore throat). Apply 1 tsp to painful area every 3 hr as needed for pain    metFORMIN (GLUCOPHAGE) 500 MG tablet Take 500 mg by mouth 2 (two) times daily with meals.    omeprazole (PRILOSEC) 20 MG capsule Take 1 capsule (20 mg total) by mouth once daily. for 20 days    ondansetron (ZOFRAN) 4 MG tablet Take 1 tablet (4 mg total) by mouth every 6 (six) hours.    ondansetron (ZOFRAN-ODT) 8 MG TbDL Take 1 tablet (8 mg total) by mouth every 6 (six) hours as needed.    oxyCODONE-acetaminophen (PERCOCET) 5-325 mg per tablet Take 1 tablet by mouth every 8 (eight) hours as needed for Pain (As needed for severe 10/10 pain).    polyethylene glycol (GLYCOLAX) 17 gram PwPk Take 17 g by mouth once daily.     Family History    None       Tobacco Use    Smoking status: Never Smoker    Smokeless tobacco: Never Used   Substance and Sexual Activity    Alcohol use: No    Drug use: No    Sexual activity: Not on file     Review of Systems   Constitutional: Positive for activity change and appetite change. Negative for chills, fatigue and  fever.   HENT: Negative for congestion.    Eyes: Negative for photophobia.   Respiratory: Negative for cough and shortness of breath.    Cardiovascular: Negative for chest pain.   Gastrointestinal: Positive for nausea and vomiting. Negative for abdominal pain, constipation and diarrhea.   Endocrine: Negative for heat intolerance.   Genitourinary: Negative for dysuria.   Musculoskeletal: Negative for myalgias.   Skin: Positive for color change, rash and wound.   Allergic/Immunologic: Negative for immunocompromised state.   Neurological: Negative for dizziness and weakness.   Hematological: Does not bruise/bleed easily.   Psychiatric/Behavioral: Negative for confusion. The patient is not nervous/anxious.      Objective:     Vital Signs (Most Recent):  Temp: 98.8 °F (37.1 °C) (02/07/22 1734)  Pulse: (!) 116 (02/07/22 1932)  Resp: 20 (02/07/22 2233)  BP: (!) 187/100 (02/07/22 1932)  SpO2: 100 % (02/07/22 1932) Vital Signs (24h Range):  Temp:  [98.8 °F (37.1 °C)] 98.8 °F (37.1 °C)  Pulse:  [113-120] 116  Resp:  [14-20] 20  SpO2:  [98 %-100 %] 100 %  BP: (165-187)/() 187/100     Weight: 119.7 kg (264 lb)  Body mass index is 38.99 kg/m².    Physical Exam  Vitals and nursing note reviewed.   Constitutional:       General: He is not in acute distress.     Appearance: He is well-developed and well-nourished. He is ill-appearing. He is not toxic-appearing or diaphoretic.   HENT:      Head: Normocephalic and atraumatic.      Nose: Nose normal.      Mouth/Throat:      Mouth: Oropharynx is clear and moist.      Pharynx: No oropharyngeal exudate.   Eyes:      General: No scleral icterus.        Right eye: No discharge.         Left eye: No discharge.      Extraocular Movements: EOM normal.      Conjunctiva/sclera: Conjunctivae normal.      Pupils: Pupils are equal, round, and reactive to light.   Neck:      Thyroid: No thyromegaly.      Vascular: No JVD.      Trachea: No tracheal deviation.   Cardiovascular:      Rate and  Rhythm: Regular rhythm. Tachycardia present.      Pulses: Intact distal pulses.      Heart sounds: Normal heart sounds. No murmur heard.  No friction rub. No gallop.    Pulmonary:      Effort: Pulmonary effort is normal. No tachypnea, bradypnea or respiratory distress.      Breath sounds: Normal breath sounds. No stridor. No decreased breath sounds, wheezing, rhonchi or rales.   Chest:      Chest wall: No tenderness.   Abdominal:      General: Bowel sounds are normal. There is no distension.      Palpations: Abdomen is soft. There is no mass.      Tenderness: There is no abdominal tenderness. There is no guarding or rebound.   Genitourinary:     Comments: No phillips in place  Musculoskeletal:         General: No tenderness or edema. Normal range of motion.      Cervical back: Normal range of motion and neck supple.   Lymphadenopathy:      Cervical: No cervical adenopathy.      Comments: Right leg edema    Skin:     General: Skin is warm and dry.      Coloration: Skin is not pale.      Findings: Erythema, lesion and rash present.      Comments: Right upper thigh with a quarter sized area of prior I&D.  There is intense erythema and induration extending about the upper groin, thigh and leg.  This does not extend to the genital area, but close.  The area of prior abscess is closed now with no drainage of packing.  It his highly indurated, so I cannot tell if there is pus underneath it or not.   Neurological:      Mental Status: He is alert and oriented to person, place, and time.      GCS: GCS eye subscore is 4. GCS verbal subscore is 5. GCS motor subscore is 6.      Cranial Nerves: No cranial nerve deficit.   Psychiatric:         Attention and Perception: Attention and perception normal.         Mood and Affect: Mood and affect normal.         Behavior: Behavior normal.         Thought Content: Thought content normal.         Cognition and Memory: Cognition and memory normal.         Judgment: Judgment normal.            CRANIAL NERVES     CN III, IV, VI   Pupils are equal, round, and reactive to light.  Extraocular motions are normal.        Significant Labs:   Recent Results (from the past 24 hour(s))   Comprehensive Metabolic Panel    Collection Time: 02/07/22  6:38 PM   Result Value Ref Range    Sodium 136 136 - 145 mmol/L    Potassium 4.5 3.5 - 5.1 mmol/L    Chloride 104 95 - 110 mmol/L    CO2 15 (L) 23 - 29 mmol/L    Glucose 276 (H) 70 - 110 mg/dL    BUN 8 6 - 20 mg/dL    Creatinine 0.9 0.5 - 1.4 mg/dL    Calcium 9.6 8.7 - 10.5 mg/dL    Total Protein 8.3 6.0 - 8.4 g/dL    Albumin 2.7 (L) 3.5 - 5.2 g/dL    Total Bilirubin 0.3 0.1 - 1.0 mg/dL    Alkaline Phosphatase 109 55 - 135 U/L    AST 11 10 - 40 U/L    ALT 15 10 - 44 U/L    Anion Gap 17 (H) 8 - 16 mmol/L    eGFR if African American >60 >60 mL/min/1.73 m^2    eGFR if non African American >60 >60 mL/min/1.73 m^2   CBC Auto Differential    Collection Time: 02/07/22  6:38 PM   Result Value Ref Range    WBC 20.04 (H) 3.90 - 12.70 K/uL    RBC 4.75 4.60 - 6.20 M/uL    Hemoglobin 13.6 (L) 14.0 - 18.0 g/dL    Hematocrit 42.6 40.0 - 54.0 %    MCV 90 82 - 98 fL    MCH 28.6 27.0 - 31.0 pg    MCHC 31.9 (L) 32.0 - 36.0 g/dL    RDW 13.3 11.5 - 14.5 %    Platelets 415 150 - 450 K/uL    MPV 9.5 9.2 - 12.9 fL    Immature Granulocytes 0.8 (H) 0.0 - 0.5 %    Gran # (ANC) 18.4 (H) 1.8 - 7.7 K/uL    Immature Grans (Abs) 0.17 (H) 0.00 - 0.04 K/uL    Lymph # 0.7 (L) 1.0 - 4.8 K/uL    Mono # 0.8 0.3 - 1.0 K/uL    Eos # 0.0 0.0 - 0.5 K/uL    Baso # 0.02 0.00 - 0.20 K/uL    nRBC 0 0 /100 WBC    Gran % 91.9 (H) 38.0 - 73.0 %    Lymph % 3.4 (L) 18.0 - 48.0 %    Mono % 3.8 (L) 4.0 - 15.0 %    Eosinophil % 0.0 0.0 - 8.0 %    Basophil % 0.1 0.0 - 1.9 %    Differential Method Automated    Troponin I    Collection Time: 02/07/22  6:38 PM   Result Value Ref Range    Troponin I <0.006 0.000 - 0.026 ng/mL   Lipase    Collection Time: 02/07/22  6:38 PM   Result Value Ref Range    Lipase 29 4 - 60 U/L    POCT COVID-19 Rapid Screening    Collection Time: 02/07/22  7:15 PM   Result Value Ref Range    POC Rapid COVID Negative Negative     Acceptable Yes    Beta-Hydroxybutyrate, Serum    Collection Time: 02/07/22  8:09 PM   Result Value Ref Range    Beta-Hydroxybutyrate 4.8 (H) 0.0 - 0.5 mmol/L   Lactic acid, plasma    Collection Time: 02/07/22  8:09 PM   Result Value Ref Range    Lactate (Lactic Acid) 1.2 0.5 - 2.2 mmol/L   Urinalysis, Reflex to Urine Culture Urine, Clean Catch    Collection Time: 02/07/22  8:11 PM    Specimen: Urine   Result Value Ref Range    Specimen UA Urine, Clean Catch     Color, UA Yellow Yellow, Straw, Meliza    Appearance, UA Clear Clear    pH, UA 6.0 5.0 - 8.0    Specific Gravity, UA >1.030 (A) 1.005 - 1.030    Protein, UA 1+ (A) Negative    Glucose, UA 4+ (A) Negative    Ketones, UA 3+ (A) Negative    Bilirubin (UA) Negative Negative    Occult Blood UA Negative Negative    Nitrite, UA Negative Negative    Urobilinogen, UA Negative <2.0 EU/dL    Leukocytes, UA Negative Negative   Urinalysis Microscopic    Collection Time: 02/07/22  8:11 PM   Result Value Ref Range    RBC, UA 2 0 - 4 /hpf    WBC, UA 4 0 - 5 /hpf    Bacteria Occasional None-Occ /hpf    Yeast, UA None None    Squam Epithel, UA 2 /hpf    Hyaline Casts, UA 0 0-1/lpf /lpf    Microscopic Comment SEE COMMENT    ISTAT PROCEDURE    Collection Time: 02/07/22  9:08 PM   Result Value Ref Range    POC PH 7.274 (L) 7.35 - 7.45    POC PCO2 35.9 35 - 45 mmHg    POC PO2 24 (L) 40 - 60 mmHg    POC HCO3 16.6 (L) 24 - 28 mmol/L    POC BE -9 -2 to 2 mmol/L    POC SATURATED O2 37 (L) 95 - 100 %    POC TCO2 18 (L) 24 - 29 mmol/L    Sample VENOUS     Site Other     Allens Test N/A     DelSys Room Air    POCT Glucose, Hand-Held Device    Collection Time: 02/07/22  9:46 PM   Result Value Ref Range    POC Glucose 224 (A) 70 - 110 MG/DL     Aerobic culture (Specify Source) **CANNOT BE ORDERED AS STAT  Order: [id number]   Status: Final  result     Visible to patient: No (inaccessible in Patient Portal)     Next appt: None    Specimen Information: Leg, Right; Abscess         1 Result Note    Component 2 d ago   Aerobic Bacterial Culture  Abnormal   STAPHYLOCOCCUS AUREUS   Many     Resulting Agency [facility]          Susceptibility     Staphylococcus aureus     CULTURE, AEROBIC  (SPECIFY SOURCE)     Clindamycin <=0.5 mcg/mL Sensitive     Erythromycin >4 mcg/mL Resistant     Oxacillin 0.5 mcg/mL Sensitive     Penicillin >8 mcg/mL Resistant     Tetracycline <=4 mcg/mL Sensitive     Trimeth/Sulfa <=0.5/9.5 m... Sensitive     Vancomycin 2 mcg/mL Sensitive               Linear View         Specimen Collected: 02/05/22 11:21 Last Resulted: 02/07/22 08:28             Significant Imaging:   XR CHEST AP PORTABLE   FINDINGS:  Cardiac wires overlie the chest.  Cardiomediastinal silhouette is stable.  Lung volumes are low, accentuating basilar markings.  No confluent area of consolidation.  No sizable pleural effusion.  No pneumothorax.     Impression:   No acute process or detrimental change when compared with 02/05/2022.      Electronically signed by: Nicholas Luna MD  Date:                                            02/07/2022  Time:                                           19:53    CT OF ABDOMEN PELVIS WITH   FINDINGS:  The liver is mildly enlarged.  No intrahepatic biliary ductal dilatation is detected.   Spleen, pancreas, kidneys, and right adrenal gland are unremarkable. The gallbladder contains no calcified gallstones.   There is a stable indeterminate left adrenal nodule.   There is no definite evidence for abdominal adenopathy or ascites.  There are multiple subcentimeter retroperitoneal lymph nodes in the periaortic region..  9 x 7 mm rounded hyperdensity or calcification is seen in the umbilicus.   Subcutaneous fat infiltration overlying the right anterolateral hip and right upper thigh is seen.  Right inguinal adenopathy is present.  There are no  pelvic masses.  The appendix is not inflamed.   There is no free fluid in the pelvis.   There is mild bibasilar atelectasis.  There is asymmetrical elevation the right hemidiaphragm.   Small hiatal hernia is present.   At L5/S1, there is grade 1 retrolisthesis and significant degenerative change.     Impression:   Right inguinal adenopathy.  Infiltration of the subcutaneous fat overlying the right anterolateral hip and upper thigh, which may be related to cellulitis.   Mild hepatomegaly.   Stable left adrenal nodule.   Small hiatal hernia.      Electronically signed by: Sierra Bell  Date:                                            02/07/2022  Time:                                           21:17    07-FEB-2022 18:20:03 EKG   Sinus tachycardia  Vent. rate 112 BPM  KS interval 144 ms  QRS duration 88 ms  QT/QTc 334/455 ms  Otherwise normal ECG  When compared with ECG of 05-FEB-2022 14:13,  No significant change was found

## 2022-02-08 NOTE — PROGRESS NOTES
Pharmacist Renal Dose Adjustment Note    Chadd Alonzo is a 41 y.o. male being treated with the medication Cefazolin    Patient Data:    Vital Signs (Most Recent):  Temp: 98.8 °F (37.1 °C) (02/07/22 1734)  Pulse: (!) 116 (02/07/22 1932)  Resp: 20 (02/07/22 1932)  BP: (!) 187/100 (02/07/22 1932)  SpO2: 100 % (02/07/22 1932)   Vital Signs (72h Range):  Temp:  [98.8 °F (37.1 °C)]   Pulse:  [113-120]   Resp:  [14-20]   BP: (165-187)/()   SpO2:  [98 %-100 %]      Recent Labs   Lab 02/07/22 1838   CREATININE 0.9     Serum creatinine: 0.9 mg/dL 02/07/22 1838  Estimated creatinine clearance: 140.7 mL/min    Medication:Cefazolin 1g q8h to 2g q8h    Pharmacist's Name: Geraldo Lindsey  Pharmacist's Extension: [phone]

## 2022-02-08 NOTE — NURSING
Received patient from ER to room via stretcher. Patient accompanied by transport. Transferred patient to bed. Evaluated general patient appearance and condition. Admit assessment initiated. Tele monitoring initiated. Saline lock  Intact. No apparent distress noted at this time. Pt complaining of nausea. Pt given PRN med. Wound  to R upper anterior NANNETTE/redness, uneven. Will continue to monitor.

## 2022-02-08 NOTE — ASSESSMENT & PLAN NOTE
He is not currently being treated with medications for this  Holding initiation of therapy until sepsis resolved

## 2022-02-08 NOTE — HPI
Mr. Alonzo is a 40yo man with a past medical history of DM2 x 19 years only on Metformin, and HTN.  He got his covid vaccinations, but is due for a booster.    He now comes to the ED with a boil which started on his right upper thigh this past Thursday (4 days ago).  He went to [facility] ED at [place] on 2/5 and underwent an I&D and was discharged on Clindamycin.  His cultures from that day ultimately grew out MSSA.  He states that since starting the Clindamycin he has had epigastric discomfort and N/V.  He now comes mostly due to progressive malaise, epigastric pain and nausea.  He denies fever or chills.  He does state that despite taking his Clindamycin, his right thigh has remained unchanged (still red, indurated and painful).    In the ED here he was found to have continued right thigh/groin cellulitis (no fasciitis on CT), sepsis, and DKA.  Started cefazolin for MSSA.

## 2022-02-08 NOTE — CONSULTS
Nutrition-Related Diabetes Education      Time Spent: N/A    Learners: Patient    Current HbA1c: None on File  Beta-Hydroxybutyrate - 2.6 and trending downward  Glucose - 203 - 276    Is patient aware of their A1c and their goal A1c?   A1c Goal 6 - 7  Glucose goal ranges 140 - 180    Home diabetes medication(s): Metformin; Pt had run out and not refilled - thought prescription was out and needed to get new PCP before he could obtain new meds.    Nutrition Education with handouts:   Carbohydrate Counting for people with DM,  Food Labels: Carbohydrates   + Clinical Reference attachments to d/c documents    Comments: Pt was not in room at time of visit, will follow up.    Follow up: 2/10/22    Please consult as needed.  Thank you!

## 2022-02-08 NOTE — ASSESSMENT & PLAN NOTE
This patient does have evidence of infective focus  My overall impression is sepsis. Vital signs were reviewed and noted in progress note.  Antibiotics given-   In ED he got Zosyn and Vanco    Antibiotics (From admission, onward)            Start     Stop Route Frequency Ordered    02/07/22 2330  cefazolin (ANCEF) 2 gram in dextrose 5% 50 mL IVPB (premix)         -- IV Every 8 hours (non-standard times) 02/07/22 2225        Cultures were taken-   Microbiology Results (last 7 days)     Procedure Component Value Units Date/Time    Blood Culture #2 **CANNOT BE ORDERED STAT** [[id number]] Collected: 02/07/22 2248    Order Status: Sent Specimen: Blood from Peripheral, Forearm, Left Updated: 02/07/22 2255    Blood Culture #1 **CANNOT BE ORDERED STAT** [[id number]] Collected: 02/07/22 2247    Order Status: Sent Specimen: Blood from Peripheral, Forearm, Left Updated: 02/07/22 2255        Latest lactate reviewed, they are-  Recent Labs   Lab 02/07/22 2009   LACTATE 1.2       Organ dysfunction indicated by DKA  Source- Right thigh abscess and cellulitis to leg    Source control Achieved by- IV antibiotics, US to evaluate for pus pocket needing to be drained

## 2022-02-08 NOTE — ED TRIAGE NOTES
Patient presents to ED with complaints of RUQ and LUQ abdominal pain which started this morning at 0500, accompanied by n/v. Patient states he has experienced these symptoms in the past and was treaetd with medication which he can not recall. Patient also reports having a boil on huis right thigh which he needs drained.

## 2022-02-09 PROBLEM — R11.0 NAUSEA: Status: ACTIVE | Noted: 2022-02-09

## 2022-02-09 LAB
ANION GAP SERPL CALC-SCNC: 12 MMOL/L (ref 8–16)
BACTERIA BLD CULT: NORMAL
BACTERIA BLD CULT: NORMAL
BASOPHILS # BLD AUTO: 0.05 K/UL (ref 0–0.2)
BASOPHILS NFR BLD: 0.3 % (ref 0–1.9)
BUN SERPL-MCNC: 7 MG/DL (ref 6–20)
CALCIUM SERPL-MCNC: 9 MG/DL (ref 8.7–10.5)
CHLORIDE SERPL-SCNC: 105 MMOL/L (ref 95–110)
CO2 SERPL-SCNC: 17 MMOL/L (ref 23–29)
CREAT SERPL-MCNC: 0.7 MG/DL (ref 0.5–1.4)
DIFFERENTIAL METHOD: ABNORMAL
EOSINOPHIL # BLD AUTO: 0.1 K/UL (ref 0–0.5)
EOSINOPHIL NFR BLD: 0.3 % (ref 0–8)
ERYTHROCYTE [DISTWIDTH] IN BLOOD BY AUTOMATED COUNT: 13.2 % (ref 11.5–14.5)
EST. GFR  (AFRICAN AMERICAN): >60 ML/MIN/1.73 M^2
EST. GFR  (NON AFRICAN AMERICAN): >60 ML/MIN/1.73 M^2
GLUCOSE SERPL-MCNC: 233 MG/DL (ref 70–110)
HCT VFR BLD AUTO: 37.6 % (ref 40–54)
HGB BLD-MCNC: 12.2 G/DL (ref 14–18)
IMM GRANULOCYTES # BLD AUTO: 0.06 K/UL (ref 0–0.04)
IMM GRANULOCYTES NFR BLD AUTO: 0.3 % (ref 0–0.5)
LYMPHOCYTES # BLD AUTO: 1.4 K/UL (ref 1–4.8)
LYMPHOCYTES NFR BLD: 7.8 % (ref 18–48)
MCH RBC QN AUTO: 28 PG (ref 27–31)
MCHC RBC AUTO-ENTMCNC: 32.4 G/DL (ref 32–36)
MCV RBC AUTO: 86 FL (ref 82–98)
MONOCYTES # BLD AUTO: 1 K/UL (ref 0.3–1)
MONOCYTES NFR BLD: 5.3 % (ref 4–15)
NEUTROPHILS # BLD AUTO: 15.7 K/UL (ref 1.8–7.7)
NEUTROPHILS NFR BLD: 86 % (ref 38–73)
NRBC BLD-RTO: 0 /100 WBC
PLATELET # BLD AUTO: 460 K/UL (ref 150–450)
PMV BLD AUTO: 9.5 FL (ref 9.2–12.9)
POCT GLUCOSE: 196 MG/DL (ref 70–110)
POCT GLUCOSE: 232 MG/DL (ref 70–110)
POCT GLUCOSE: 237 MG/DL (ref 70–110)
POCT GLUCOSE: 247 MG/DL (ref 70–110)
POTASSIUM SERPL-SCNC: 4.1 MMOL/L (ref 3.5–5.1)
RBC # BLD AUTO: 4.36 M/UL (ref 4.6–6.2)
SODIUM SERPL-SCNC: 134 MMOL/L (ref 136–145)
WBC # BLD AUTO: 18.25 K/UL (ref 3.9–12.7)

## 2022-02-09 PROCEDURE — 63600175 PHARM REV CODE 636 W HCPCS: Performed by: INTERNAL MEDICINE

## 2022-02-09 PROCEDURE — C9399 UNCLASSIFIED DRUGS OR BIOLOG: HCPCS | Performed by: INTERNAL MEDICINE

## 2022-02-09 PROCEDURE — 80048 BASIC METABOLIC PNL TOTAL CA: CPT | Performed by: INTERNAL MEDICINE

## 2022-02-09 PROCEDURE — 63600175 PHARM REV CODE 636 W HCPCS: Performed by: HOSPITALIST

## 2022-02-09 PROCEDURE — 25000003 PHARM REV CODE 250: Performed by: INTERNAL MEDICINE

## 2022-02-09 PROCEDURE — 83036 HEMOGLOBIN GLYCOSYLATED A1C: CPT | Performed by: HOSPITALIST

## 2022-02-09 PROCEDURE — 36415 COLL VENOUS BLD VENIPUNCTURE: CPT | Performed by: INTERNAL MEDICINE

## 2022-02-09 PROCEDURE — 85025 COMPLETE CBC W/AUTO DIFF WBC: CPT | Performed by: INTERNAL MEDICINE

## 2022-02-09 PROCEDURE — 21400001 HC TELEMETRY ROOM

## 2022-02-09 PROCEDURE — 25000003 PHARM REV CODE 250: Performed by: HOSPITALIST

## 2022-02-09 PROCEDURE — 36415 COLL VENOUS BLD VENIPUNCTURE: CPT | Performed by: HOSPITALIST

## 2022-02-09 RX ORDER — INSULIN ASPART 100 [IU]/ML
8 INJECTION, SOLUTION INTRAVENOUS; SUBCUTANEOUS
Status: DISCONTINUED | OUTPATIENT
Start: 2022-02-09 | End: 2022-02-13 | Stop reason: HOSPADM

## 2022-02-09 RX ORDER — SODIUM CHLORIDE 9 MG/ML
INJECTION, SOLUTION INTRAVENOUS CONTINUOUS
Status: DISCONTINUED | OUTPATIENT
Start: 2022-02-09 | End: 2022-02-10

## 2022-02-09 RX ORDER — LOSARTAN POTASSIUM 25 MG/1
50 TABLET ORAL DAILY
Status: DISCONTINUED | OUTPATIENT
Start: 2022-02-09 | End: 2022-02-10

## 2022-02-09 RX ORDER — HYDRALAZINE HYDROCHLORIDE 25 MG/1
25 TABLET, FILM COATED ORAL EVERY 8 HOURS PRN
Status: DISCONTINUED | OUTPATIENT
Start: 2022-02-09 | End: 2022-02-13 | Stop reason: HOSPADM

## 2022-02-09 RX ORDER — BISACODYL 10 MG
10 SUPPOSITORY, RECTAL RECTAL DAILY
Status: DISCONTINUED | OUTPATIENT
Start: 2022-02-09 | End: 2022-02-10

## 2022-02-09 RX ADMIN — INSULIN ASPART 8 UNITS: 100 INJECTION, SOLUTION INTRAVENOUS; SUBCUTANEOUS at 05:02

## 2022-02-09 RX ADMIN — FAMOTIDINE 20 MG: 20 TABLET ORAL at 08:02

## 2022-02-09 RX ADMIN — SODIUM CHLORIDE: 0.9 INJECTION, SOLUTION INTRAVENOUS at 03:02

## 2022-02-09 RX ADMIN — INSULIN ASPART 8 UNITS: 100 INJECTION, SOLUTION INTRAVENOUS; SUBCUTANEOUS at 11:02

## 2022-02-09 RX ADMIN — SENNOSIDES AND DOCUSATE SODIUM 1 TABLET: 50; 8.6 TABLET ORAL at 11:02

## 2022-02-09 RX ADMIN — INSULIN ASPART 1 UNITS: 100 INJECTION, SOLUTION INTRAVENOUS; SUBCUTANEOUS at 10:02

## 2022-02-09 RX ADMIN — MELATONIN TAB 3 MG 6 MG: 3 TAB at 08:02

## 2022-02-09 RX ADMIN — ONDANSETRON 4 MG: 2 INJECTION INTRAMUSCULAR; INTRAVENOUS at 08:02

## 2022-02-09 RX ADMIN — ENOXAPARIN SODIUM 40 MG: 40 INJECTION SUBCUTANEOUS at 05:02

## 2022-02-09 RX ADMIN — PROMETHAZINE HYDROCHLORIDE 12.5 MG: 25 INJECTION INTRAMUSCULAR; INTRAVENOUS at 11:02

## 2022-02-09 RX ADMIN — LOSARTAN POTASSIUM 50 MG: 25 TABLET, FILM COATED ORAL at 09:02

## 2022-02-09 RX ADMIN — CEFAZOLIN SODIUM 2 G: 2 SOLUTION INTRAVENOUS at 05:02

## 2022-02-09 RX ADMIN — INSULIN DETEMIR 25 UNITS: 100 INJECTION, SOLUTION SUBCUTANEOUS at 11:02

## 2022-02-09 RX ADMIN — FAMOTIDINE 20 MG: 20 TABLET ORAL at 11:02

## 2022-02-09 RX ADMIN — BISACODYL 10 MG: 10 SUPPOSITORY RECTAL at 08:02

## 2022-02-09 RX ADMIN — CEFAZOLIN SODIUM 2 G: 2 SOLUTION INTRAVENOUS at 11:02

## 2022-02-09 RX ADMIN — ONDANSETRON 4 MG: 2 INJECTION INTRAMUSCULAR; INTRAVENOUS at 05:02

## 2022-02-09 NOTE — ASSESSMENT & PLAN NOTE
He underwent I&D at [place] on 2/5. Culture MSSA, blood cultures NGTD. Surrounding cellulitis and purulence on admit  - wound care consulted  - continue cefazolin

## 2022-02-09 NOTE — SUBJECTIVE & OBJECTIVE
Interval History: Complains of nausea, poor PO intake, dehydration, constipation.     Review of Systems   Constitutional: Positive for fatigue. Negative for activity change, appetite change, chills and fever.   Respiratory: Negative for cough, chest tightness and shortness of breath.    Cardiovascular: Negative for chest pain, palpitations and leg swelling.   Gastrointestinal: Positive for constipation and nausea. Negative for abdominal distention, abdominal pain, diarrhea and vomiting.   Genitourinary: Negative for difficulty urinating.   Musculoskeletal: Negative for arthralgias and myalgias.   Skin: Positive for wound.   Neurological: Negative for dizziness, weakness, light-headedness, numbness and headaches.   Psychiatric/Behavioral: Negative for confusion.     Objective:     Vital Signs (Most Recent):  Temp: 98.3 °F (36.8 °C) (02/09/22 1112)  Pulse: 92 (02/09/22 1112)  Resp: 19 (02/09/22 1112)  BP: (!) 173/84 (02/09/22 1112)  SpO2: 99 % (02/09/22 1112) Vital Signs (24h Range):  Temp:  [97.8 °F (36.6 °C)-98.4 °F (36.9 °C)] 98.3 °F (36.8 °C)  Pulse:  [] 92  Resp:  [19-20] 19  SpO2:  [97 %-100 %] 99 %  BP: (118-173)/(57-95) 173/84     Weight: 112.8 kg (248 lb 10.9 oz)  Body mass index is 36.72 kg/m².    Intake/Output Summary (Last 24 hours) at 2/9/2022 1353  Last data filed at 2/9/2022 1010  Gross per 24 hour   Intake 440 ml   Output --   Net 440 ml      Physical Exam  Vitals and nursing note reviewed.   Constitutional:       General: He is not in acute distress.     Appearance: He is obese. He is ill-appearing. He is not toxic-appearing.   HENT:      Head: Normocephalic and atraumatic.      Nose: Nose normal.      Mouth/Throat:      Mouth: Mucous membranes are dry.   Cardiovascular:      Rate and Rhythm: Normal rate and regular rhythm.      Pulses: Normal pulses.      Heart sounds: Normal heart sounds. No murmur heard.  No gallop.    Pulmonary:      Effort: Pulmonary effort is normal. No respiratory  distress.      Breath sounds: Normal breath sounds. No wheezing or rales.      Comments: Room air  Abdominal:      General: Bowel sounds are normal. There is no distension.      Palpations: Abdomen is soft.      Tenderness: There is abdominal tenderness (epigastrium). There is no guarding or rebound.   Musculoskeletal:      Right lower leg: No edema.      Left lower leg: No edema.   Skin:     General: Skin is warm and dry.   Neurological:      Mental Status: He is alert and oriented to person, place, and time.         Significant Labs: All pertinent labs within the past 24 hours have been reviewed.    Significant Imaging: I have reviewed all pertinent imaging results/findings within the past 24 hours.

## 2022-02-09 NOTE — ASSESSMENT & PLAN NOTE
Not improved yet. CT abd/pelvis completed  - continue antiemetics  - IVF while not taking PO  - famotidine ordered

## 2022-02-09 NOTE — ASSESSMENT & PLAN NOTE
This patient does have evidence of infective focus  My overall impression is sepsis. Vital signs were reviewed and noted in progress note.  Antibiotics given-    Antibiotics (From admission, onward)            Start     Stop Route Frequency Ordered    02/07/22 2330  cefazolin (ANCEF) 2 gram in dextrose 5% 50 mL IVPB (premix)         -- IV Every 8 hours (non-standard times) 02/07/22 2225        Cultures were taken-   Microbiology Results (last 7 days)     Procedure Component Value Units Date/Time    Blood Culture #2 **CANNOT BE ORDERED STAT** [[id number]] Collected: 02/07/22 2248    Order Status: Completed Specimen: Blood from Peripheral, Forearm, Left Updated: 02/08/22 2303     Blood Culture, Routine No Growth to date      No Growth to date    Blood Culture #1 **CANNOT BE ORDERED STAT** [[id number]] Collected: 02/07/22 2247    Order Status: Completed Specimen: Blood from Peripheral, Forearm, Left Updated: 02/08/22 2303     Blood Culture, Routine No Growth to date      No Growth to date        Latest lactate reviewed, they are-  Recent Labs   Lab 02/07/22 2009   LACTATE 1.2       Organ dysfunction indicated by DKA  Source- Right thigh abscess and cellulitis to leg    S/p I&D  Wound care consulted  Continue cefazolin

## 2022-02-09 NOTE — PLAN OF CARE
[facility] [place] - Telemetry [place]  Initial Discharge Assessment  Nitin Blanton (Mother)   [phone] (Mobile)     Primary Care Provider: [facility]    Admission Date: 2/7/2022  Expected Discharge Date:     Discharge Barriers Identified: Other (see comments)    Payor: MEDICAID / Plan: [employer] BETTER HEALTH [facility] / Product Type: Managed Medicaid /     Extended Emergency Contact Information  Primary Emergency Contact: Nitin Blanton   [facility]  Home Phone: [phone]  Mobile Phone: [phone]  Relation: Mother  Preferred language: English    Discharge Plan A: Home with family  Discharge Plan B: Home with family      [id number] [address] AT [facility] & [address] [address]  Phone: [phone] Fax: [phone]      Initial Assessment (most recent)     Adult Discharge Assessment - 02/09/22 1519        Discharge Assessment    Assessment Type Discharge Planning Assessment     Source of Information patient;health record     Reason For Admission Epigastric Pain, DKA     Lives With parent(s)     Do you expect to return to your current living situation? Yes     Do you have help at home or someone to help you manage your care at home? Yes     Who are your caregiver(s) and their phone number(s)? Nitin Blanton (Mother)   [phone] (Mobile)     Prior to hospitilization cognitive status: Alert/Oriented     Current cognitive status: Alert/Oriented     Walking or Climbing Stairs Difficulty other (see comments)     Equipment Currently Used at Home other (see comments)     Readmission within 30 days? No     Patient currently being followed by outpatient case management? Unable to determine (comments)     Do you currently have service(s) that help you manage your care at home? No     Do you take prescription medications? Yes     Do you have prescription coverage? Yes     Coverage A[employer] Better Health     Do you  have any problems affording any of your prescribed medications? TBD     Who is going to help you get home at discharge? Nitin Blanton (Mother)   [phone] (Mobile)     How do you get to doctors appointments? family or friend will provide     Are you on dialysis? No     Do you take coumadin? No     Discharge Plan A Home with family     Discharge Plan B Home with family     DME Needed Upon Discharge  other (see comments)     Discharge Plan discussed with: Patient     Discharge Barriers Identified Other (see comments)        Relationship/Environment    Name(s) of Who Lives With Patient Nitin Blanton (Mother)   [phone] (Mobile)

## 2022-02-09 NOTE — PROGRESS NOTES
[facility] Medicine  Progress Note    Patient Name: Chadd Alonzo  MRN: [id number]  Patient Class: IP- Inpatient   Admission Date: 2/7/2022  Length of Stay: 2 days  Attending Physician: Una Cunningham MD  Primary Care Provider: Primary Doctor No        Subjective:     Principal Problem:Diabetic ketoacidosis without coma associated with type 2 diabetes mellitus        HPI:  Mr. Alonzo is a 40yo man with a past medical history of DM2 x 19 years only on Metformin, and HTN.  He got his covid vaccinations, but is due for a booster.    He now comes to the ED with a boil which started on his right upper thigh this past Thursday (4 days ago).  He went to [facility] ED at [place] on 2/5 and underwent an I&D and was discharged on Clindamycin.  His cultures from that day ultimately grew out MSSA.  He states that since starting the Clindamycin he has had epigastric discomfort and N/V.  He now comes mostly due to progressive malaise, epigastric pain and nausea.  He denies fever or chills.  He does state that despite taking his Clindamycin, his right thigh has remained unchanged (still red, indurated and painful).    In the ED here he was found to have continued right thigh/groin cellulitis (no fasciitis on CT), sepsis, and DKA.  Started cefazolin for MSSA.       Overview/Hospital Course:  Mr Chadd Alonzo is a 41 y.o. man who presented with DKA. Resolved with IV insulin and IVF. Started cefazolin for R thigh abscess s/p I&D with known MSSA and continued induration around the I&D site. Blood cultures NGTD. Stepped down to floor. Glucose control improved. Continues to have nausea.       Interval History: Complains of nausea, poor PO intake, dehydration, constipation.     Review of Systems   Constitutional: Positive for fatigue. Negative for activity change, appetite change, chills and fever.   Respiratory: Negative for cough, chest tightness and shortness of breath.    Cardiovascular: Negative for chest pain,  palpitations and leg swelling.   Gastrointestinal: Positive for constipation and nausea. Negative for abdominal distention, abdominal pain, diarrhea and vomiting.   Genitourinary: Negative for difficulty urinating.   Musculoskeletal: Negative for arthralgias and myalgias.   Skin: Positive for wound.   Neurological: Negative for dizziness, weakness, light-headedness, numbness and headaches.   Psychiatric/Behavioral: Negative for confusion.     Objective:     Vital Signs (Most Recent):  Temp: 98.3 °F (36.8 °C) (02/09/22 1112)  Pulse: 92 (02/09/22 1112)  Resp: 19 (02/09/22 1112)  BP: (!) 173/84 (02/09/22 1112)  SpO2: 99 % (02/09/22 1112) Vital Signs (24h Range):  Temp:  [97.8 °F (36.6 °C)-98.4 °F (36.9 °C)] 98.3 °F (36.8 °C)  Pulse:  [] 92  Resp:  [19-20] 19  SpO2:  [97 %-100 %] 99 %  BP: (118-173)/(57-95) 173/84     Weight: 112.8 kg (248 lb 10.9 oz)  Body mass index is 36.72 kg/m².    Intake/Output Summary (Last 24 hours) at 2/9/2022 1353  Last data filed at 2/9/2022 1010  Gross per 24 hour   Intake 440 ml   Output --   Net 440 ml      Physical Exam  Vitals and nursing note reviewed.   Constitutional:       General: He is not in acute distress.     Appearance: He is obese. He is ill-appearing. He is not toxic-appearing.   HENT:      Head: Normocephalic and atraumatic.      Nose: Nose normal.      Mouth/Throat:      Mouth: Mucous membranes are dry.   Cardiovascular:      Rate and Rhythm: Normal rate and regular rhythm.      Pulses: Normal pulses.      Heart sounds: Normal heart sounds. No murmur heard.  No gallop.    Pulmonary:      Effort: Pulmonary effort is normal. No respiratory distress.      Breath sounds: Normal breath sounds. No wheezing or rales.      Comments: Room air  Abdominal:      General: Bowel sounds are normal. There is no distension.      Palpations: Abdomen is soft.      Tenderness: There is abdominal tenderness (epigastrium). There is no guarding or rebound.   Musculoskeletal:      Right  lower leg: No edema.      Left lower leg: No edema.   Skin:     General: Skin is warm and dry.   Neurological:      Mental Status: He is alert and oriented to person, place, and time.         Significant Labs: All pertinent labs within the past 24 hours have been reviewed.    Significant Imaging: I have reviewed all pertinent imaging results/findings within the past 24 hours.      Assessment/Plan:      * Diabetic ketoacidosis without coma associated with type 2 diabetes mellitus  Presented with DKA. That is resolved. Continues to have nausea and poor PO intake- continue IVF and antiemetics  A1c: ordered  Meds: detemir + aspart insulin + SSI PRN to maintain goal 140-180  ADA diet, accuchecks, hypoglycemic protocol        Nausea  Not improved yet. CT abd/pelvis completed  - continue antiemetics  - IVF while not taking PO  - famotidine ordered       Sepsis due to methicillin susceptible Staphylococcus aureus (MSSA) without acute organ dysfunction  This patient does have evidence of infective focus  My overall impression is sepsis. Vital signs were reviewed and noted in progress note.  Antibiotics given-    Antibiotics (From admission, onward)            Start     Stop Route Frequency Ordered    02/07/22 2330  cefazolin (ANCEF) 2 gram in dextrose 5% 50 mL IVPB (premix)         -- IV Every 8 hours (non-standard times) 02/07/22 2225        Cultures were taken-   Microbiology Results (last 7 days)     Procedure Component Value Units Date/Time    Blood Culture #2 **CANNOT BE ORDERED STAT** [[id number]] Collected: 02/07/22 2248    Order Status: Completed Specimen: Blood from Peripheral, Forearm, Left Updated: 02/08/22 2303     Blood Culture, Routine No Growth to date      No Growth to date    Blood Culture #1 **CANNOT BE ORDERED STAT** [[id number]] Collected: 02/07/22 2247    Order Status: Completed Specimen: Blood from Peripheral, Forearm, Left Updated: 02/08/22 2303     Blood Culture, Routine No Growth to date      No Growth  to date        Latest lactate reviewed, they are-  Recent Labs   Lab 02/07/22 2009   LACTATE 1.2       Organ dysfunction indicated by DKA  Source- Right thigh abscess and cellulitis to leg    S/p I&D  Wound care consulted  Continue cefazolin       Essential hypertension  Not on Rx at home  Start losartan         Abscess of right thigh  He underwent I&D at [place] on 2/5. Culture MSSA, blood cultures NGTD. Surrounding cellulitis and purulence on admit  - wound care consulted  - continue cefazolin        VTE Risk Mitigation (From admission, onward)         Ordered     enoxaparin injection 40 mg  Daily         02/07/22 2103     IP VTE HIGH RISK PATIENT  Once         02/07/22 2103     Place WOLF hose  Until discontinued         02/07/22 2103     Place sequential compression device  Until discontinued         02/07/22 2103                Discharge Planning   [place]:      Code Status: Full Code   Is the patient medically ready for discharge?:     Reason for patient still in hospital (select all that apply): Patient trending condition                     Una Cunningham MD  Department of Hospital Medicine   [facility]

## 2022-02-09 NOTE — ASSESSMENT & PLAN NOTE
Presented with DKA. That is resolved. Continues to have nausea and poor PO intake- continue IVF and antiemetics  A1c: ordered  Meds: detemir + aspart insulin + SSI PRN to maintain goal 140-180  ADA diet, accuchecks, hypoglycemic protocol

## 2022-02-09 NOTE — PLAN OF CARE
Problem: Diabetic Ketoacidosis  Goal: Fluid and Electrolyte Balance with Absence of Ketosis  Outcome: Ongoing, Progressing     Problem: Adult Inpatient Plan of Care  Goal: Plan of Care Review  Outcome: Ongoing, Progressing  Flowsheets (Taken 2/9/2022 9439)  Plan of Care Reviewed With: patient  Goal: Absence of Hospital-Acquired Illness or Injury  Outcome: Ongoing, Progressing  Goal: Optimal Comfort and Wellbeing  Outcome: Ongoing, Progressing     Problem: Infection  Goal: Absence of Infection Signs and Symptoms  Outcome: Ongoing, Progressing     Problem: Diabetes Comorbidity  Goal: Blood Glucose Level Within Targeted Range  Outcome: Ongoing, Progressing     Problem: Impaired Wound Healing  Goal: Optimal Wound Healing  Outcome: Ongoing, Progressing  Plan of care reviewed with pt. Pt voiced understanding. Pt AAO X 4. Pt c/o R thigh pain during the shift with relief from prn medication. Pt also c/o nausea, pt states he thinks the abx is causing the nausea. Pt didn't want to take medication but advised the importance of taking medication. Pt also requesting to get protein shakes instead of meals. Advised he would have to speak with the MD about this concern. Pt states he hasn't had a bowel movement since sat, 2/5. Pt was given prn senna-docusate last night and advised it was ordered bid prn as needed. Advised him if he didn't have a bowel movement this am, to let am nurse know so he could get another dose. No apparent distress noted. Fall precautions maintained. Pt remains free of fall or injury. Bed in lowest position, locked, and call light within reach. Side rails up x's 2 with slip resistant socks on.

## 2022-02-09 NOTE — HOSPITAL COURSE
Mr Chadd Alonzo is a 41 y.o. man who presented with DKA. Resolved with IV insulin and IVF. Started cefazolin for R thigh abscess s/p I&D with known MSSA and continued induration around the I&D site. Blood cultures NGTD. Stepped down to floor. Glucose control improved. General surgery consulted, US obtained and no abscess, no surgical intervention. Patient transitioned to PO abx and insulin regimen adjusted. He was discharged home in stable condition on insulin and PO abx with PCP, endocrinology, and general surgery follow up.

## 2022-02-10 LAB
ANION GAP SERPL CALC-SCNC: 12 MMOL/L (ref 8–16)
BASOPHILS # BLD AUTO: 0.06 K/UL (ref 0–0.2)
BASOPHILS NFR BLD: 0.5 % (ref 0–1.9)
BUN SERPL-MCNC: 7 MG/DL (ref 6–20)
CALCIUM SERPL-MCNC: 8.6 MG/DL (ref 8.7–10.5)
CHLORIDE SERPL-SCNC: 104 MMOL/L (ref 95–110)
CO2 SERPL-SCNC: 21 MMOL/L (ref 23–29)
CREAT SERPL-MCNC: 0.6 MG/DL (ref 0.5–1.4)
DIFFERENTIAL METHOD: ABNORMAL
EOSINOPHIL # BLD AUTO: 0.1 K/UL (ref 0–0.5)
EOSINOPHIL NFR BLD: 0.9 % (ref 0–8)
ERYTHROCYTE [DISTWIDTH] IN BLOOD BY AUTOMATED COUNT: 12.9 % (ref 11.5–14.5)
EST. GFR  (AFRICAN AMERICAN): >60 ML/MIN/1.73 M^2
EST. GFR  (NON AFRICAN AMERICAN): >60 ML/MIN/1.73 M^2
ESTIMATED AVG GLUCOSE: 275 MG/DL (ref 68–131)
GLUCOSE SERPL-MCNC: 197 MG/DL (ref 70–110)
HBA1C MFR BLD: 11.2 % (ref 4–5.6)
HCT VFR BLD AUTO: 39.7 % (ref 40–54)
HGB BLD-MCNC: 13.2 G/DL (ref 14–18)
IMM GRANULOCYTES # BLD AUTO: 0.1 K/UL (ref 0–0.04)
IMM GRANULOCYTES NFR BLD AUTO: 0.9 % (ref 0–0.5)
LYMPHOCYTES # BLD AUTO: 1.7 K/UL (ref 1–4.8)
LYMPHOCYTES NFR BLD: 14.4 % (ref 18–48)
MCH RBC QN AUTO: 28.2 PG (ref 27–31)
MCHC RBC AUTO-ENTMCNC: 33.2 G/DL (ref 32–36)
MCV RBC AUTO: 85 FL (ref 82–98)
MONOCYTES # BLD AUTO: 1 K/UL (ref 0.3–1)
MONOCYTES NFR BLD: 8.4 % (ref 4–15)
NEUTROPHILS # BLD AUTO: 8.8 K/UL (ref 1.8–7.7)
NEUTROPHILS NFR BLD: 74.9 % (ref 38–73)
NRBC BLD-RTO: 0 /100 WBC
PLATELET # BLD AUTO: 459 K/UL (ref 150–450)
PMV BLD AUTO: 9.1 FL (ref 9.2–12.9)
POCT GLUCOSE: 198 MG/DL (ref 70–110)
POCT GLUCOSE: 202 MG/DL (ref 70–110)
POCT GLUCOSE: 222 MG/DL (ref 70–110)
POCT GLUCOSE: 231 MG/DL (ref 70–110)
POTASSIUM SERPL-SCNC: 3.5 MMOL/L (ref 3.5–5.1)
RBC # BLD AUTO: 4.68 M/UL (ref 4.6–6.2)
SODIUM SERPL-SCNC: 137 MMOL/L (ref 136–145)
WBC # BLD AUTO: 11.72 K/UL (ref 3.9–12.7)

## 2022-02-10 PROCEDURE — 25000003 PHARM REV CODE 250: Performed by: STUDENT IN AN ORGANIZED HEALTH CARE EDUCATION/TRAINING PROGRAM

## 2022-02-10 PROCEDURE — 36415 COLL VENOUS BLD VENIPUNCTURE: CPT | Performed by: HOSPITALIST

## 2022-02-10 PROCEDURE — 63600175 PHARM REV CODE 636 W HCPCS: Performed by: INTERNAL MEDICINE

## 2022-02-10 PROCEDURE — 25000003 PHARM REV CODE 250: Performed by: INTERNAL MEDICINE

## 2022-02-10 PROCEDURE — 36415 COLL VENOUS BLD VENIPUNCTURE: CPT | Performed by: STUDENT IN AN ORGANIZED HEALTH CARE EDUCATION/TRAINING PROGRAM

## 2022-02-10 PROCEDURE — 85025 COMPLETE CBC W/AUTO DIFF WBC: CPT | Performed by: STUDENT IN AN ORGANIZED HEALTH CARE EDUCATION/TRAINING PROGRAM

## 2022-02-10 PROCEDURE — 21400001 HC TELEMETRY ROOM

## 2022-02-10 PROCEDURE — C9399 UNCLASSIFIED DRUGS OR BIOLOG: HCPCS | Performed by: INTERNAL MEDICINE

## 2022-02-10 PROCEDURE — 80048 BASIC METABOLIC PNL TOTAL CA: CPT | Performed by: HOSPITALIST

## 2022-02-10 PROCEDURE — 25000003 PHARM REV CODE 250: Performed by: HOSPITALIST

## 2022-02-10 RX ORDER — LOSARTAN POTASSIUM 25 MG/1
100 TABLET ORAL DAILY
Status: DISCONTINUED | OUTPATIENT
Start: 2022-02-11 | End: 2022-02-13 | Stop reason: HOSPADM

## 2022-02-10 RX ORDER — PANTOPRAZOLE SODIUM 40 MG/1
40 TABLET, DELAYED RELEASE ORAL DAILY
Status: DISCONTINUED | OUTPATIENT
Start: 2022-02-10 | End: 2022-02-13 | Stop reason: HOSPADM

## 2022-02-10 RX ORDER — CALCIUM CARBONATE 200(500)MG
1000 TABLET,CHEWABLE ORAL 3 TIMES DAILY PRN
Status: DISCONTINUED | OUTPATIENT
Start: 2022-02-10 | End: 2022-02-13 | Stop reason: HOSPADM

## 2022-02-10 RX ORDER — DOXYCYCLINE HYCLATE 100 MG
100 TABLET ORAL EVERY 12 HOURS
Status: DISCONTINUED | OUTPATIENT
Start: 2022-02-10 | End: 2022-02-13 | Stop reason: HOSPADM

## 2022-02-10 RX ORDER — BISACODYL 10 MG
10 SUPPOSITORY, RECTAL RECTAL DAILY PRN
Status: DISCONTINUED | OUTPATIENT
Start: 2022-02-10 | End: 2022-02-13 | Stop reason: HOSPADM

## 2022-02-10 RX ADMIN — INSULIN ASPART 8 UNITS: 100 INJECTION, SOLUTION INTRAVENOUS; SUBCUTANEOUS at 12:02

## 2022-02-10 RX ADMIN — DOXYCYCLINE HYCLATE 100 MG: 100 TABLET, COATED ORAL at 12:02

## 2022-02-10 RX ADMIN — CEFAZOLIN SODIUM 2 G: 2 SOLUTION INTRAVENOUS at 01:02

## 2022-02-10 RX ADMIN — INSULIN ASPART 8 UNITS: 100 INJECTION, SOLUTION INTRAVENOUS; SUBCUTANEOUS at 09:02

## 2022-02-10 RX ADMIN — HYDRALAZINE HYDROCHLORIDE 25 MG: 25 TABLET, FILM COATED ORAL at 09:02

## 2022-02-10 RX ADMIN — MELATONIN TAB 3 MG 6 MG: 3 TAB at 09:02

## 2022-02-10 RX ADMIN — INSULIN DETEMIR 25 UNITS: 100 INJECTION, SOLUTION SUBCUTANEOUS at 09:02

## 2022-02-10 RX ADMIN — SODIUM CHLORIDE: 0.9 INJECTION, SOLUTION INTRAVENOUS at 06:02

## 2022-02-10 RX ADMIN — ENOXAPARIN SODIUM 40 MG: 40 INJECTION SUBCUTANEOUS at 06:02

## 2022-02-10 RX ADMIN — CEFAZOLIN SODIUM 2 G: 2 SOLUTION INTRAVENOUS at 08:02

## 2022-02-10 RX ADMIN — PANTOPRAZOLE SODIUM 40 MG: 40 TABLET, DELAYED RELEASE ORAL at 02:02

## 2022-02-10 RX ADMIN — INSULIN ASPART 8 UNITS: 100 INJECTION, SOLUTION INTRAVENOUS; SUBCUTANEOUS at 06:02

## 2022-02-10 RX ADMIN — INSULIN ASPART 2 UNITS: 100 INJECTION, SOLUTION INTRAVENOUS; SUBCUTANEOUS at 09:02

## 2022-02-10 RX ADMIN — CALCIUM CARBONATE (ANTACID) CHEW TAB 500 MG 1000 MG: 500 CHEW TAB at 02:02

## 2022-02-10 RX ADMIN — DOXYCYCLINE HYCLATE 100 MG: 100 TABLET, COATED ORAL at 09:02

## 2022-02-10 RX ADMIN — FAMOTIDINE 20 MG: 20 TABLET ORAL at 08:02

## 2022-02-10 RX ADMIN — LOSARTAN POTASSIUM 50 MG: 25 TABLET, FILM COATED ORAL at 08:02

## 2022-02-10 NOTE — NURSING
Report received from JOSÉ MIGUEL Luna. Patient resting comfortably in bed, awake and alert.  no acute distress noted. Plan of care reviewed with patient. Instructed patient to call for assistance before ambulating, side rails up x2, bed alarm set, call light in reach, non skid socks in use. IV fluids infusing to Peripheral IV site, no redness or swelling noted.  Patient verbalized understanding of instructions. Will continue to monitor.

## 2022-02-10 NOTE — PROGRESS NOTES
"Nutrition-Related Diabetes Education        Time Spent: 10-15 min     Learners: Patient     Current HbA1c: 11.2 (2/9/2022)  Beta-Hydroxybutyrate - 2.6 and trending downward (2/8)  Glucose - 197 (2/10)     Is patient aware of their A1c and their goal A1c?   A1c Goal 6 - 7  Glucose goal ranges 140 - 180     Home diabetes medication(s): Metformin; Pt had run out and not refilled - thought prescription was out and needed to get new PCP before he could obtain new meds.     Nutrition Education with handouts:   Carbohydrate Counting for people with DM,  Food Labels: Carbohydrates   + Clinical Reference attachments to d/c documents     Comments:   2/10 - Spoke with pt today who said he 'knows what he is supposed to be doing but just hasn't been doing it." We discussed serving sizes for different types of carbohydrates and how many serving sizes he should be eating in one meal and total throughout the day. We discussed monitoring refined sugar intake and avoid sweetened beverages and dessert foods. He told me that he used to eat well, exercise, and monitor his blood glucose at home but no longer has a device. He told me he planned on finding a PCP after he got out of the hospital. Pt verbalized understanding and seemed very enthusiastic about making changes.     2/8 - Pt was not in room at time of visit, will follow up.     Follow up: Not needed at this time     Please consult as needed.  Thank you!  "

## 2022-02-10 NOTE — CONSULTS
Thank you for your consult to [facility]. We have reviewed the patient chart. This patient does meet criteria for [facility] service at this time. Will assume care on 02/10/22 at 6AM     Mendez Encinas MD  [facility]

## 2022-02-10 NOTE — NURSING
Dr Encinas did a Virtual visit with pt.He will go home on Insulin. Diet changed to [place] Diet .BP down to 139/88 it was 192/94.All safety precautions in use.

## 2022-02-10 NOTE — PROGRESS NOTES
Dressing on right anterior thigh with small amount of purulent drainage. Induration surrounding I&D site remains the same. States less pain. Area of induration palpated and expressed a small amount of thick white purulent material from area above incision near groin. Probed wound with cotton swab and unable to locate tunneling. Dressed wound with Vashe moistened gauze. Informed Hospitalist of status of wound. To consult surgery to evaluate.   Discussed with patient importance of BG management to prevent recurrence of skin abscesses. States will work on BG management and willing to start taking insulin, if needed.

## 2022-02-10 NOTE — PLAN OF CARE
Problem: Diabetic Ketoacidosis  Goal: Fluid and Electrolyte Balance with Absence of Ketosis  Outcome: Ongoing, Progressing     Problem: Adult Inpatient Plan of Care  Goal: Plan of Care Review  Outcome: Ongoing, Progressing  Goal: Patient-Specific Goal (Individualized)  Outcome: Ongoing, Progressing  Goal: Absence of Hospital-Acquired Illness or Injury  Outcome: Ongoing, Progressing  Goal: Optimal Comfort and Wellbeing  Outcome: Ongoing, Progressing  Goal: Readiness for Transition of Care  Outcome: Ongoing, Progressing     Problem: Infection  Goal: Absence of Infection Signs and Symptoms  Outcome: Ongoing, Progressing     Problem: Diabetes Comorbidity  Goal: Blood Glucose Level Within Targeted Range  Outcome: Ongoing, Progressing     Problem: Impaired Wound Healing  Goal: Optimal Wound Healing  Outcome: Ongoing, Progressing

## 2022-02-10 NOTE — PLAN OF CARE
Problem: Diabetic Ketoacidosis  Goal: Fluid and Electrolyte Balance with Absence of Ketosis  Outcome: Ongoing, Progressing     Problem: Infection  Goal: Absence of Infection Signs and Symptoms  Outcome: Ongoing, Progressing     Problem: Diabetes Comorbidity  Goal: Blood Glucose Level Within Targeted Range  Outcome: Ongoing, Progressing

## 2022-02-11 LAB
ANION GAP SERPL CALC-SCNC: 11 MMOL/L (ref 8–16)
BACTERIA BLD CULT: NORMAL
BACTERIA BLD CULT: NORMAL
BUN SERPL-MCNC: 11 MG/DL (ref 6–20)
CALCIUM SERPL-MCNC: 8.5 MG/DL (ref 8.7–10.5)
CHLORIDE SERPL-SCNC: 106 MMOL/L (ref 95–110)
CO2 SERPL-SCNC: 21 MMOL/L (ref 23–29)
CREAT SERPL-MCNC: 0.7 MG/DL (ref 0.5–1.4)
EST. GFR  (AFRICAN AMERICAN): >60 ML/MIN/1.73 M^2
EST. GFR  (NON AFRICAN AMERICAN): >60 ML/MIN/1.73 M^2
GLUCOSE SERPL-MCNC: 249 MG/DL (ref 70–110)
POCT GLUCOSE: 215 MG/DL (ref 70–110)
POCT GLUCOSE: 240 MG/DL (ref 70–110)
POCT GLUCOSE: 261 MG/DL (ref 70–110)
POCT GLUCOSE: 268 MG/DL (ref 70–110)
POTASSIUM SERPL-SCNC: 4.4 MMOL/L (ref 3.5–5.1)
SODIUM SERPL-SCNC: 138 MMOL/L (ref 136–145)

## 2022-02-11 PROCEDURE — 21400001 HC TELEMETRY ROOM

## 2022-02-11 PROCEDURE — 36415 COLL VENOUS BLD VENIPUNCTURE: CPT | Performed by: HOSPITALIST

## 2022-02-11 PROCEDURE — 25000003 PHARM REV CODE 250: Performed by: STUDENT IN AN ORGANIZED HEALTH CARE EDUCATION/TRAINING PROGRAM

## 2022-02-11 PROCEDURE — 80048 BASIC METABOLIC PNL TOTAL CA: CPT | Performed by: HOSPITALIST

## 2022-02-11 PROCEDURE — 25000003 PHARM REV CODE 250: Performed by: INTERNAL MEDICINE

## 2022-02-11 PROCEDURE — 63600175 PHARM REV CODE 636 W HCPCS: Performed by: INTERNAL MEDICINE

## 2022-02-11 RX ADMIN — INSULIN ASPART 8 UNITS: 100 INJECTION, SOLUTION INTRAVENOUS; SUBCUTANEOUS at 12:02

## 2022-02-11 RX ADMIN — MELATONIN TAB 3 MG 6 MG: 3 TAB at 09:02

## 2022-02-11 RX ADMIN — INSULIN ASPART 8 UNITS: 100 INJECTION, SOLUTION INTRAVENOUS; SUBCUTANEOUS at 05:02

## 2022-02-11 RX ADMIN — ENOXAPARIN SODIUM 40 MG: 40 INJECTION SUBCUTANEOUS at 05:02

## 2022-02-11 RX ADMIN — DOXYCYCLINE HYCLATE 100 MG: 100 TABLET, COATED ORAL at 09:02

## 2022-02-11 RX ADMIN — DOXYCYCLINE HYCLATE 100 MG: 100 TABLET, COATED ORAL at 12:02

## 2022-02-11 RX ADMIN — INSULIN DETEMIR 25 UNITS: 100 INJECTION, SOLUTION SUBCUTANEOUS at 12:02

## 2022-02-11 RX ADMIN — INSULIN ASPART 2 UNITS: 100 INJECTION, SOLUTION INTRAVENOUS; SUBCUTANEOUS at 09:02

## 2022-02-11 RX ADMIN — INSULIN ASPART 4 UNITS: 100 INJECTION, SOLUTION INTRAVENOUS; SUBCUTANEOUS at 09:02

## 2022-02-11 RX ADMIN — PANTOPRAZOLE SODIUM 40 MG: 40 TABLET, DELAYED RELEASE ORAL at 12:02

## 2022-02-11 RX ADMIN — LOSARTAN POTASSIUM 100 MG: 25 TABLET, FILM COATED ORAL at 09:02

## 2022-02-11 RX ADMIN — INSULIN ASPART 6 UNITS: 100 INJECTION, SOLUTION INTRAVENOUS; SUBCUTANEOUS at 05:02

## 2022-02-11 NOTE — PROGRESS NOTES
[facility] Medicine  Telemedicine Progress Note    Patient Name: Chadd Alonzo  MRN: [id number]  Patient Class: IP- Inpatient   Admission Date: 2/7/2022  Length of Stay: 4 days  Attending Physician: Mendez Encinas MD  Primary Care Provider: Primary Doctor No          Subjective:     Principal Problem:Diabetic ketoacidosis without coma associated with type 2 diabetes mellitus        HPI:  Mr. Alonzo is a 42yo man with a past medical history of DM2 x 19 years only on Metformin, and HTN.  He got his covid vaccinations, but is due for a booster.    He now comes to the ED with a boil which started on his right upper thigh this past Thursday (4 days ago).  He went to [facility] ED at [place] on 2/5 and underwent an I&D and was discharged on Clindamycin.  His cultures from that day ultimately grew out MSSA.  He states that since starting the Clindamycin he has had epigastric discomfort and N/V.  He now comes mostly due to progressive malaise, epigastric pain and nausea.  He denies fever or chills.  He does state that despite taking his Clindamycin, his right thigh has remained unchanged (still red, indurated and painful).    In the ED here he was found to have continued right thigh/groin cellulitis (no fasciitis on CT), sepsis, and DKA.  Started cefazolin for MSSA.       Overview/Hospital Course:  Mr Chadd Alonzo is a 41 y.o. man who presented with DKA. Resolved with IV insulin and IVF. Started cefazolin for R thigh abscess s/p I&D with known MSSA and continued induration around the I&D site. Blood cultures NGTD. Stepped down to floor. Glucose control improved. Continues to have nausea.       Interval History: Patient NPO since midnight in case of surgical intervention - multiple attempts by nursing and myself to reach surgical team today without success. US without evidence of abscess so diet resume - stable on PO abx. Anticipate discharge tomorrow.    Review of Systems   Constitutional: Negative  for activity change, appetite change, chills, fatigue and fever.   Respiratory: Negative for cough, chest tightness and shortness of breath.    Cardiovascular: Negative for chest pain, palpitations and leg swelling.   Gastrointestinal: Negative for abdominal distention, abdominal pain, constipation, diarrhea, nausea and vomiting.   Genitourinary: Negative for difficulty urinating.   Musculoskeletal: Negative for arthralgias and myalgias.   Skin: Positive for wound.   Neurological: Negative for dizziness, weakness, light-headedness, numbness and headaches.   Psychiatric/Behavioral: Negative for confusion.     Objective:     Vital Signs (Most Recent):  Temp: 98.3 °F (36.8 °C) (02/11/22 1412)  Pulse: 96 (02/11/22 1412)  Resp: 19 (02/11/22 1412)  BP: 125/86 (02/11/22 1412)  SpO2: 99 % (02/11/22 1412) Vital Signs (24h Range):  Temp:  [97.7 °F (36.5 °C)-98.3 °F (36.8 °C)] 98.3 °F (36.8 °C)  Pulse:  [] 96  Resp:  [18-19] 19  SpO2:  [95 %-99 %] 99 %  BP: (125-141)/(74-90) 125/86     Weight: 112.8 kg (248 lb 10.9 oz)  Body mass index is 36.72 kg/m².  No intake or output data in the 24 hours ending 02/11/22 1613   Physical Exam  Vitals and nursing note reviewed.   Constitutional:       General: He is not in acute distress.     Appearance: He is obese. He is ill-appearing. He is not toxic-appearing.   HENT:      Head: Normocephalic and atraumatic.   Cardiovascular:      Rate and Rhythm: Normal rate and regular rhythm.   Pulmonary:      Effort: Pulmonary effort is normal. No respiratory distress.      Comments: Room air  Abdominal:      General: Bowel sounds are normal. There is no distension.      Palpations: Abdomen is soft.      Tenderness: There is no guarding or rebound.   Musculoskeletal:      Right lower leg: No edema.      Left lower leg: No edema.   Skin:     General: Skin is warm and dry.   Neurological:      Mental Status: He is alert and oriented to person, place, and time.         Significant Labs: All  pertinent labs within the past 24 hours have been reviewed.    Significant Imaging: I have reviewed all pertinent imaging results/findings within the past 24 hours.      Assessment/Plan:      * Diabetic ketoacidosis without coma associated with type 2 diabetes mellitus  Presented with DKA. That is resolved. Continues to have nausea and poor PO intake- continue IVF and antiemetics  A1c: ordered  Meds: detemir + aspart insulin + SSI PRN to maintain goal 140-180  ADA diet, accuchecks, hypoglycemic protocol        Nausea  - CT abd/pelvis completed  - continue antiemetics  - protonix ordered   - suspect GERD      Sepsis due to methicillin susceptible Staphylococcus aureus (MSSA) without acute organ dysfunction  This patient does have evidence of infective focus  My overall impression is sepsis. Vital signs were reviewed and noted in progress note.  Antibiotics given-    Antibiotics (From admission, onward)            Start     Stop Route Frequency Ordered    02/10/22 1100  doxycycline tablet 100 mg         -- Oral Every 12 hours 02/10/22 0958        Cultures were taken-   Microbiology Results (last 7 days)     Procedure Component Value Units Date/Time    Blood Culture #2 **CANNOT BE ORDERED STAT** [[id number]] Collected: 02/07/22 2248    Order Status: Completed Specimen: Blood from Peripheral, Forearm, Left Updated: 02/10/22 2303     Blood Culture, Routine No Growth to date      No Growth to date      No Growth to date      No Growth to date    Blood Culture #1 **CANNOT BE ORDERED STAT** [[id number]] Collected: 02/07/22 2247    Order Status: Completed Specimen: Blood from Peripheral, Forearm, Left Updated: 02/10/22 2303     Blood Culture, Routine No Growth to date      No Growth to date      No Growth to date      No Growth to date        Latest lactate reviewed, they are-  No results for input(s): LACTATE in the last 72 hours.    Organ dysfunction indicated by DKA  Source- Right thigh abscess and cellulitis to leg    S/p  I&D  Wound care consulted  Continue cefpodoxime      Essential hypertension  Not on Rx at home  Start losartan         Abscess of right thigh  He underwent I&D at [place] on 2/5. Culture MSSA, blood cultures NGTD. Surrounding cellulitis and purulence on admit  - wound care consulted, recommending general surgery consult  - continue cefazolin now de-escalated to cefpodoxime  - gen surg consulted, US ordered without evidence of abscess   - continue wound care        VTE Risk Mitigation (From admission, onward)         Ordered     enoxaparin injection 40 mg  Daily         02/07/22 2103     IP VTE HIGH RISK PATIENT  Once         02/07/22 2103     Place WOLF hose  Until discontinued         02/07/22 2103     Place sequential compression device  Until discontinued         02/07/22 2103                      I have assessed these finding virtually using telemed platform and with assistance of bedside nurse                 The attending portion of this evaluation, treatment, and documentation was performed per Mendez Encinas MD via Telemedicine AudioVisual using the secure [employer] software platform with 2 way audio/video. The provider was located off-site and the patient is located in the hospital. The aforementioned video software was utilized to document the relevant history and physical exam    Mendez Encinas MD  Department of Hospital Medicine   [facility]

## 2022-02-11 NOTE — NURSING
Patient to Ultrasound unit per wheelchair. Awake and alert, not in distress.    21:30 Patient back to Unit.

## 2022-02-11 NOTE — SUBJECTIVE & OBJECTIVE
Interval History: Patient NPO since midnight in case of surgical intervention - multiple attempts by nursing and myself to reach surgical team today without success. US without evidence of abscess so diet resume - stable on PO abx. Anticipate discharge tomorrow.    Review of Systems   Constitutional: Negative for activity change, appetite change, chills, fatigue and fever.   Respiratory: Negative for cough, chest tightness and shortness of breath.    Cardiovascular: Negative for chest pain, palpitations and leg swelling.   Gastrointestinal: Negative for abdominal distention, abdominal pain, constipation, diarrhea, nausea and vomiting.   Genitourinary: Negative for difficulty urinating.   Musculoskeletal: Negative for arthralgias and myalgias.   Skin: Positive for wound.   Neurological: Negative for dizziness, weakness, light-headedness, numbness and headaches.   Psychiatric/Behavioral: Negative for confusion.     Objective:     Vital Signs (Most Recent):  Temp: 98.3 °F (36.8 °C) (02/11/22 1412)  Pulse: 96 (02/11/22 1412)  Resp: 19 (02/11/22 1412)  BP: 125/86 (02/11/22 1412)  SpO2: 99 % (02/11/22 1412) Vital Signs (24h Range):  Temp:  [97.7 °F (36.5 °C)-98.3 °F (36.8 °C)] 98.3 °F (36.8 °C)  Pulse:  [] 96  Resp:  [18-19] 19  SpO2:  [95 %-99 %] 99 %  BP: (125-141)/(74-90) 125/86     Weight: 112.8 kg (248 lb 10.9 oz)  Body mass index is 36.72 kg/m².  No intake or output data in the 24 hours ending 02/11/22 1613   Physical Exam  Vitals and nursing note reviewed.   Constitutional:       General: He is not in acute distress.     Appearance: He is obese. He is ill-appearing. He is not toxic-appearing.   HENT:      Head: Normocephalic and atraumatic.   Cardiovascular:      Rate and Rhythm: Normal rate and regular rhythm.   Pulmonary:      Effort: Pulmonary effort is normal. No respiratory distress.      Comments: Room air  Abdominal:      General: Bowel sounds are normal. There is no distension.      Palpations:  Abdomen is soft.      Tenderness: There is no guarding or rebound.   Musculoskeletal:      Right lower leg: No edema.      Left lower leg: No edema.   Skin:     General: Skin is warm and dry.   Neurological:      Mental Status: He is alert and oriented to person, place, and time.         Significant Labs: All pertinent labs within the past 24 hours have been reviewed.    Significant Imaging: I have reviewed all pertinent imaging results/findings within the past 24 hours.

## 2022-02-11 NOTE — NURSING
Pt awake and alert. Remains NPO .Reports having a good night . Denies nausea at this time.Dressing intact to right groin. All safety measures in use.

## 2022-02-11 NOTE — ASSESSMENT & PLAN NOTE
This patient does have evidence of infective focus  My overall impression is sepsis. Vital signs were reviewed and noted in progress note.  Antibiotics given-    Antibiotics (From admission, onward)            Start     Stop Route Frequency Ordered    02/10/22 1100  doxycycline tablet 100 mg         -- Oral Every 12 hours 02/10/22 0958        Cultures were taken-   Microbiology Results (last 7 days)     Procedure Component Value Units Date/Time    Blood Culture #2 **CANNOT BE ORDERED STAT** [[id number]] Collected: 02/07/22 2248    Order Status: Completed Specimen: Blood from Peripheral, Forearm, Left Updated: 02/10/22 2303     Blood Culture, Routine No Growth to date      No Growth to date      No Growth to date      No Growth to date    Blood Culture #1 **CANNOT BE ORDERED STAT** [[id number]] Collected: 02/07/22 2247    Order Status: Completed Specimen: Blood from Peripheral, Forearm, Left Updated: 02/10/22 2303     Blood Culture, Routine No Growth to date      No Growth to date      No Growth to date      No Growth to date        Latest lactate reviewed, they are-  No results for input(s): LACTATE in the last 72 hours.    Organ dysfunction indicated by DKA  Source- Right thigh abscess and cellulitis to leg    S/p I&D  Wound care consulted  Continue cefpodoxime

## 2022-02-11 NOTE — PROGRESS NOTES
[facility] Medicine  Telemedicine Progress Note    Patient Name: Chadd Alonzo  MRN: [id number]  Patient Class: IP- Inpatient   Admission Date: 2/7/2022  Length of Stay: 3 days  Attending Physician: Mendez Encinas MD  Primary Care Provider: Primary Doctor No          Subjective:     Principal Problem:Diabetic ketoacidosis without coma associated with type 2 diabetes mellitus        HPI:  Mr. Alonzo is a 40yo man with a past medical history of DM2 x 19 years only on Metformin, and HTN.  He got his covid vaccinations, but is due for a booster.    He now comes to the ED with a boil which started on his right upper thigh this past Thursday (4 days ago).  He went to [facility] ED at [place] on 2/5 and underwent an I&D and was discharged on Clindamycin.  His cultures from that day ultimately grew out MSSA.  He states that since starting the Clindamycin he has had epigastric discomfort and N/V.  He now comes mostly due to progressive malaise, epigastric pain and nausea.  He denies fever or chills.  He does state that despite taking his Clindamycin, his right thigh has remained unchanged (still red, indurated and painful).    In the ED here he was found to have continued right thigh/groin cellulitis (no fasciitis on CT), sepsis, and DKA.  Started cefazolin for MSSA.       Overview/Hospital Course:  Mr Chadd Alonzo is a 41 y.o. man who presented with DKA. Resolved with IV insulin and IVF. Started cefazolin for R thigh abscess s/p I&D with known MSSA and continued induration around the I&D site. Blood cultures NGTD. Stepped down to floor. Glucose control improved. Continues to have nausea.       Interval History: Patient continues to have complaints of nausea, poor PO intake. Glucose remains stable. Wound care notified me that general surgery should be consulted to evaluate thigh wound as concern for fluid collection - general surgery consulted.     Review of Systems   Constitutional: Positive for  fatigue. Negative for activity change, appetite change, chills and fever.   Respiratory: Negative for cough, chest tightness and shortness of breath.    Cardiovascular: Negative for chest pain, palpitations and leg swelling.   Gastrointestinal: Positive for constipation and nausea. Negative for abdominal distention, abdominal pain, diarrhea and vomiting.   Genitourinary: Negative for difficulty urinating.   Musculoskeletal: Negative for arthralgias and myalgias.   Skin: Positive for wound.   Neurological: Negative for dizziness, weakness, light-headedness, numbness and headaches.   Psychiatric/Behavioral: Negative for confusion.     Objective:     Vital Signs (Most Recent):  Temp: 98.3 °F (36.8 °C) (02/10/22 2028)  Pulse: 83 (02/10/22 2028)  Resp: 18 (02/10/22 2028)  BP: 130/78 (02/10/22 2028)  SpO2: 97 % (02/10/22 2028) Vital Signs (24h Range):  Temp:  [97.5 °F (36.4 °C)-98.3 °F (36.8 °C)] 98.3 °F (36.8 °C)  Pulse:  [67-84] 83  Resp:  [10-19] 18  SpO2:  [95 %-100 %] 97 %  BP: (130-192)/(78-99) 130/78     Weight: 112.8 kg (248 lb 10.9 oz)  Body mass index is 36.72 kg/m².  No intake or output data in the 24 hours ending 02/10/22 2125   Physical Exam  Vitals and nursing note reviewed.   Constitutional:       General: He is not in acute distress.     Appearance: He is obese. He is ill-appearing. He is not toxic-appearing.   HENT:      Head: Normocephalic and atraumatic.   Cardiovascular:      Rate and Rhythm: Normal rate and regular rhythm.   Pulmonary:      Effort: Pulmonary effort is normal. No respiratory distress.      Comments: Room air  Abdominal:      General: Bowel sounds are normal. There is no distension.      Palpations: Abdomen is soft.      Tenderness: There is no guarding or rebound.   Musculoskeletal:      Right lower leg: No edema.      Left lower leg: No edema.   Skin:     General: Skin is warm and dry.   Neurological:      Mental Status: He is alert and oriented to person, place, and time.          Significant Labs: All pertinent labs within the past 24 hours have been reviewed.    Significant Imaging: I have reviewed all pertinent imaging results/findings within the past 24 hours.      Assessment/Plan:      * Diabetic ketoacidosis without coma associated with type 2 diabetes mellitus  Presented with DKA. That is resolved. Continues to have nausea and poor PO intake- continue IVF and antiemetics  A1c: ordered  Meds: detemir + aspart insulin + SSI PRN to maintain goal 140-180  ADA diet, accuchecks, hypoglycemic protocol        Nausea  Not improved yet. CT abd/pelvis completed  - continue antiemetics  - IVF while not taking PO  - famotidine ordered       Sepsis due to methicillin susceptible Staphylococcus aureus (MSSA) without acute organ dysfunction  This patient does have evidence of infective focus  My overall impression is sepsis. Vital signs were reviewed and noted in progress note.  Antibiotics given-    Antibiotics (From admission, onward)            Start     Stop Route Frequency Ordered    02/10/22 1100  doxycycline tablet 100 mg         -- Oral Every 12 hours 02/10/22 0958        Cultures were taken-   Microbiology Results (last 7 days)     Procedure Component Value Units Date/Time    Blood Culture #2 **CANNOT BE ORDERED STAT** [[id number]] Collected: 02/07/22 2248    Order Status: Completed Specimen: Blood from Peripheral, Forearm, Left Updated: 02/09/22 2303     Blood Culture, Routine No Growth to date      No Growth to date      No Growth to date    Blood Culture #1 **CANNOT BE ORDERED STAT** [[id number]] Collected: 02/07/22 2247    Order Status: Completed Specimen: Blood from Peripheral, Forearm, Left Updated: 02/09/22 2303     Blood Culture, Routine No Growth to date      No Growth to date      No Growth to date        Latest lactate reviewed, they are-  No results for input(s): LACTATE in the last 72 hours.    Organ dysfunction indicated by DKA  Source- Right thigh abscess and cellulitis  to leg    S/p I&D  Wound care consulted  Continue cefazolin       Essential hypertension  Not on Rx at home  Start losartan         Abscess of right thigh  He underwent I&D at [place] on 2/5. Culture MSSA, blood cultures NGTD. Surrounding cellulitis and purulence on admit  - wound care consulted, recommending general surgery consult  - continue cefazolin now de-escalated to cefpodoxime  - gen surg consulted, NPO at midnight, US ordered        VTE Risk Mitigation (From admission, onward)         Ordered     enoxaparin injection 40 mg  Daily         02/07/22 2103     IP VTE HIGH RISK PATIENT  Once         02/07/22 2103     Place WOLF hose  Until discontinued         02/07/22 2103     Place sequential compression device  Until discontinued         02/07/22 2103                      I have assessed these finding virtually using telemed platform and with assistance of bedside nurse                 The attending portion of this evaluation, treatment, and documentation was performed per Mendez Encinas MD via Telemedicine AudioVisual using the secure [employer] software platform with 2 way audio/video. The provider was located off-site and the patient is located in the hospital. The aforementioned video software was utilized to document the relevant history and physical exam    Mendez Encinas MD  Department of Hospital Medicine   [facility]

## 2022-02-11 NOTE — NURSING
March 28, 2018     Jill Rodriguezsaeidnicolle, [address]    Patient: Ruby Lewis   YOB: 1983   Date of Visit: 3/28/2018       Dear Dr Trina Aquion: Thank you for referring Ruby Lewis to me for evaluation  Below are my notes for this consultation  If you have questions, please do not hesitate to call me  I look forward to following your patient along with you  Sincerely,        Luli Matamoros MD        CC: No Recipients  Luli Matamoros MD  3/28/2018 10:09 AM  Sign at close encounter               Surgical Oncology Follow Up       [address] [place] [address] [address] [facility]  1983  [id number]  [address] [facility],[address]  [facility] [address] [address]      Chief Complaint:     Chief Complaint   Patient presents with    breast itching per patient for three months       Assessment and Plan:   Assessment/Plan   Right breast itching, no rash no mammographic or ultrasound abnormalities  , history of bilateral breast implants  Breast MRI    Oncology History:      No history exists  History of Present Illness: This is a 29-year-old woman who 3 years ago had bilateral breast implants, subpectoral gel filled  She has had no problems with the implants  Approximately 3 months ago she developed breast itching deep within the breast no skin changes  Some of the itching is sensed at the nipple  There is not associated with any breast signs  The patient had normal mammogram and ultrasound  She mistakenly took hydralazine instead of hydroxyzine which is caused a total-body skin rash which persist   She has treated this with steroids which did not significantly ameliorate her total body skin rash  She has a follow-up appointment with her physicians for this  She presents now for an opinion regarding further management of the persistent right breast itching    She Pt eating .Very excited. Pt demonstrated his knowledge on what he was taught as far as him giving himself his Insulin. He did great . Pt dressing change to groin area . Wound peripheral hard to touch . Inner thigh area slightly red . Scant amount of yellowish drainage observed .Whitish /yellow color to wound itself .Cleaned with Vash . Wet to dry dressing applied Medipore dressing applied . Pt reports being out of the bed walking around . All safety measures in use.     has not appreciated any masses or changes in her breast     Review of Systems:   Review of Systems   Constitutional: Negative for activity change, appetite change, fatigue and unexpected weight change  HENT: Negative for ear pain, tinnitus, trouble swallowing and voice change  Eyes: Negative for pain and visual disturbance  Respiratory: Negative for cough, shortness of breath, wheezing and stridor  Cardiovascular: Negative for chest pain, palpitations and leg swelling  Gastrointestinal: Negative for abdominal distention, abdominal pain and blood in stool  Endocrine: Negative for cold intolerance and heat intolerance  Genitourinary: Negative for difficulty urinating, dysuria, flank pain and hematuria  Musculoskeletal: Negative for arthralgias, back pain, gait problem and joint swelling  Skin: Negative for color change, rash and wound  Allergic/Immunologic: Negative for immunocompromised state  Neurological: Negative for dizziness, seizures, speech difficulty, weakness and headaches  Hematological: Negative for adenopathy  Psychiatric/Behavioral: Negative for confusion  Past Medical History:      Patient Active Problem List   Diagnosis    PARESH (stress urinary incontinence, female)    Symptoms in breast    History of bilateral breast implants      History reviewed  No pertinent past medical history  Past Surgical History:   Procedure Laterality Date    AUGMENTATION BREAST      IMPLANTS    BREAST SURGERY      VAGINAL DELIVERY          Family History   Problem Relation Age of Onset    Hyperlipidemia Mother     No Known Problems Father     Stomach cancer Maternal Grandfather         Social History     Social History    Marital status: Single     Spouse name: N/A    Number of children: N/A    Years of education: N/A     Occupational History    Not on file       Social History Main Topics    Smoking status: Never Smoker    Smokeless tobacco: Never Used      Comment: NEVER A SMOKER AS PER ALL SCRIPTS TOO     Alcohol use No    Drug use: No    Sexual activity: Yes     Partners: Male     Birth control/ protection: IUD     Other Topics Concern    Not on file     Social History Narrative    No narrative on file        Current Outpatient Prescriptions:     Methylprednisolone 4 MG TBPK, Use as directed on package, Disp: 21 tablet, Rfl: 0     Allergies   Allergen Reactions    Hydralazine Hives    Alcohol Swabs [Isopropyl Alcohol] Itching       Physical Exam:     Vitals:    03/28/18 0929   BP: 102/70   Pulse: 68   Resp: 14   Temp: (!) 97 4 °F (36 3 °C)     Physical Exam   Pulmonary/Chest:     Both breasts were examined in the sitting and supine position  There are no worrisome skin lesions, she has a faint rash on her bilateral breast which is significantly less than on her arms her abdomen consistent with the drug reaction  no nipple retraction and no nipple discharge  There are no dominant masses, axillary adenopathy or supraclavicular adenopathy on either side  Results:   Pathology:    Imaging  I reviewed her mammograms I concur with report  Discussion/Summary:   The etiology of her pruritus is unclear  There are no physical signs  She has seen her plastic surgeon who does not think this related to the implants however I have recommended an MRI to make sure, I see no evidence of malignancy though the MRI will help confirm this which would be consistent with her mammogram and ultrasound  I will see her back following her MRI  Advance Care Planning/Advance Directives:  I discussed the disease status, treatment plans and follow-up with the patient

## 2022-02-11 NOTE — NURSING
Pt did a great job on giving himself the Insulin . No c/o nausea or pain at this time . Watching TV . Pt says he is ready to go home. Dressing to right groin remains intact. All safety measures in use .

## 2022-02-11 NOTE — CONSULTS
Chadd Alonzo  [id number]    CC/Reason for Consultation:   Right thigh abscess    HPI:  41 y.o. male with pmh of DM and HTN admitted in [place].  Patient recently was seen at an outside ED for a right thigh abscess, which was I&D.  Patient says the abscess has been there for about 1 weeks, and progressively got larger.  Says he had this happen once before but did not require surgical drainage.  Currently vitals are stable, leukocytosis resolved.   Patient says his thigh feels good, pain mainly when probing the wound.      Past Medical History:   Diagnosis Date    Diabetes mellitus     Hypertension        History reviewed. No pertinent surgical history.    Social History     Socioeconomic History    Marital status:    Tobacco Use    Smoking status: Never Smoker    Smokeless tobacco: Never Used   Substance and Sexual Activity    Alcohol use: No    Drug use: No       History reviewed. No pertinent family history.    Review of patient's allergies indicates:  No Known Allergies    ROS - Negative except what is noted in hpi    Objective    Vitals:    02/10/22 1656   BP: (!) 141/86   Pulse: 84   Resp: 19   Temp: 98.3 °F (36.8 °C)       CBC   Recent Labs   Lab 02/09/22  0412 02/10/22  0818   WBC 18.25* 11.72   HGB 12.2* 13.2*   HCT 37.6* 39.7*   * 459*       CHEM   Recent Labs   Lab 02/09/22  0412 02/10/22  0541   * 137   K 4.1 3.5    104   CO2 17* 21*   BUN 7 7   CREATININE 0.7 0.6   * 197*       GEN: Awake, alert, resting comfortable in bed   HEENT: NCAT  RESP: Normal WOB, no distress  CV: RR  ABD: Soft, NTND, no guarding/rebound  EXT: WWP, no edema  SKIN: warm, right anterior thigh I&D site with 2cm incision,  With some purulent drainage.  Wound probed with cavity about 4mm deep.  No tracking noted   NEURO: alert, normal speech  PSYCH: normal mood and affect    Imaging Results          US Extremity Non Vascular Complete Right (Final result)  Result time 02/08/22 01:39:43    Final result by  Sierra Bell MD (02/08/22 01:39:43)                 Impression:      Above described.      Electronically signed by: Sierra Bell  Date:    02/08/2022  Time:    01:39             Narrative:    EXAMINATION:  ULTRASOUND EXTREMITY NONVASCULAR COMPLETE RIGHT    CLINICAL HISTORY:  Right upper thigh area of prior abscess (was I&D'd 2/5), but still with WBC 20, DKA and severe induration and redness to area.;    TECHNIQUE:  Real-time ultrasound of the upper thighs was performed.    COMPARISON:  Real-time ultrasound of the upper thighs was performed.    FINDINGS:  No drainable fluid collection is detected.  There is no increased vascularity.                               CT Abdomen Pelvis With Contrast (Final result)  Result time 02/07/22 21:17:47    Final result by Sierra Bell MD (02/07/22 21:17:47)                 Impression:      Right inguinal adenopathy.  Infiltration of the subcutaneous fat overlying the right anterolateral hip and upper thigh, which may be related to cellulitis.    Mild hepatomegaly.    Stable left adrenal nodule.    Small hiatal hernia.      Electronically signed by: Sierra Bell  Date:    02/07/2022  Time:    21:17             Narrative:    EXAMINATION:  CT OF ABDOMEN PELVIS WITH    CLINICAL HISTORY:  Epigastric pain that started this morning.    TECHNIQUE:  5 mm enhanced axial images were obtained from the lung bases through the greater trochanters.  One hundred mL of Omnipaque 350 was injected.    COMPARISON:  07/11/2018    FINDINGS:  The liver is mildly enlarged.  No intrahepatic biliary ductal dilatation is detected.    Spleen, pancreas, kidneys, and right adrenal gland are unremarkable. The gallbladder contains no calcified gallstones.    There is a stable indeterminate left adrenal nodule.    There is no definite evidence for abdominal adenopathy or ascites.  There are multiple subcentimeter retroperitoneal lymph nodes in the periaortic region..  9 x 7 mm rounded  "hyperdensity or calcification is seen in the umbilicus.    Subcutaneous fat infiltration overlying the right anterolateral hip and right upper thigh is seen.  Right inguinal adenopathy is present.  There are no pelvic masses.  The appendix is not inflamed.    There is no free fluid in the pelvis.    There is mild bibasilar atelectasis.  There is asymmetrical elevation the right hemidiaphragm.    Small hiatal hernia is present.    At L5/S1, there is grade 1 retrolisthesis and significant degenerative change.                               X-Ray Chest AP Portable (Final result)  Result time 02/07/22 19:53:35    Final result by Nicholas Luna MD (02/07/22 19:53:35)                 Impression:      No acute process or detrimental change when compared with 02/05/2022.      Electronically signed by: Nicholas Luna MD  Date:    02/07/2022  Time:    19:53             Narrative:    EXAMINATION:  XR CHEST AP PORTABLE    CLINICAL HISTORY:  Provided history is "  Unspecified abdominal pain".    TECHNIQUE:  One view of the chest.    COMPARISON:  02/05/2022.    FINDINGS:  Cardiac wires overlie the chest.  Cardiomediastinal silhouette is stable.  Lung volumes are low, accentuating basilar markings.  No confluent area of consolidation.  No sizable pleural effusion.  No pneumothorax.                                  A/P: 41 y.o. male with right anterior thigh abscess that had a previous I&D and is draining.  Patient still has some induration surrounding the abscess, no tracking noted on exam    -US of the right thigh to look for further fluid collections  -continue abx  -npo at midnight incase further abscess cavity noted on US, but may be able to be done at bedside      Dre Vail MD  [facility] Surgery PGY-V    "

## 2022-02-11 NOTE — NURSING
Pt education teaching on how to give himself  Insulin. Pt says he already knows how to stick himself . His parents were diabetics. Pt did stick himself and was successful . Pt says he has to buy another machine. Pt practiced on a pillow giving Insulin . Pt was successful injecting himself. Pt ate all of his supper .All safety measures in use.

## 2022-02-11 NOTE — ASSESSMENT & PLAN NOTE
This patient does have evidence of infective focus  My overall impression is sepsis. Vital signs were reviewed and noted in progress note.  Antibiotics given-    Antibiotics (From admission, onward)            Start     Stop Route Frequency Ordered    02/10/22 1100  doxycycline tablet 100 mg         -- Oral Every 12 hours 02/10/22 0958        Cultures were taken-   Microbiology Results (last 7 days)     Procedure Component Value Units Date/Time    Blood Culture #2 **CANNOT BE ORDERED STAT** [[id number]] Collected: 02/07/22 2248    Order Status: Completed Specimen: Blood from Peripheral, Forearm, Left Updated: 02/09/22 2303     Blood Culture, Routine No Growth to date      No Growth to date      No Growth to date    Blood Culture #1 **CANNOT BE ORDERED STAT** [[id number]] Collected: 02/07/22 2247    Order Status: Completed Specimen: Blood from Peripheral, Forearm, Left Updated: 02/09/22 2303     Blood Culture, Routine No Growth to date      No Growth to date      No Growth to date        Latest lactate reviewed, they are-  No results for input(s): LACTATE in the last 72 hours.    Organ dysfunction indicated by DKA  Source- Right thigh abscess and cellulitis to leg    S/p I&D  Wound care consulted  Continue cefazolin

## 2022-02-11 NOTE — NURSING
Report received from Griselda, RN. Patient resting comfortably in bed, awake and alert.  No acute distress noted. Plan of care reviewed with patient. Instructed patient to call for assistance whenever needed, side rails up x2, bed alarm set, call light in reach, non skid socks in use. Peripheral IV site, no redness or swelling noted.  Patient verbalized understanding of instructions. Will continue to monitor.

## 2022-02-11 NOTE — ASSESSMENT & PLAN NOTE
He underwent I&D at [facility] on 2/5. Culture MSSA, blood cultures NGTD. Surrounding cellulitis and purulence on admit  - wound care consulted, recommending general surgery consult  - continue cefazolin now de-escalated to cefpodoxime  - gen surg consulted, NPO at midnight, US ordered

## 2022-02-11 NOTE — NURSING
Pt did good demonstrating on how to give himself Insulin . He gave the SS 4units coverage. Pt remains NPO . All safety measures in use.

## 2022-02-11 NOTE — ASSESSMENT & PLAN NOTE
He underwent I&D at [facility] on 2/5. Culture MSSA, blood cultures NGTD. Surrounding cellulitis and purulence on admit  - wound care consulted, recommending general surgery consult  - continue cefazolin now de-escalated to cefpodoxime  - gen surg consulted, US ordered without evidence of abscess   - continue wound care

## 2022-02-11 NOTE — SUBJECTIVE & OBJECTIVE
Interval History: Patient continues to have complaints of nausea, poor PO intake. Glucose remains stable. Wound care notified me that general surgery should be consulted to evaluate thigh wound as concern for fluid collection - general surgery consulted.     Review of Systems   Constitutional: Positive for fatigue. Negative for activity change, appetite change, chills and fever.   Respiratory: Negative for cough, chest tightness and shortness of breath.    Cardiovascular: Negative for chest pain, palpitations and leg swelling.   Gastrointestinal: Positive for constipation and nausea. Negative for abdominal distention, abdominal pain, diarrhea and vomiting.   Genitourinary: Negative for difficulty urinating.   Musculoskeletal: Negative for arthralgias and myalgias.   Skin: Positive for wound.   Neurological: Negative for dizziness, weakness, light-headedness, numbness and headaches.   Psychiatric/Behavioral: Negative for confusion.     Objective:     Vital Signs (Most Recent):  Temp: 98.3 °F (36.8 °C) (02/10/22 2028)  Pulse: 83 (02/10/22 2028)  Resp: 18 (02/10/22 2028)  BP: 130/78 (02/10/22 2028)  SpO2: 97 % (02/10/22 2028) Vital Signs (24h Range):  Temp:  [97.5 °F (36.4 °C)-98.3 °F (36.8 °C)] 98.3 °F (36.8 °C)  Pulse:  [67-84] 83  Resp:  [10-19] 18  SpO2:  [95 %-100 %] 97 %  BP: (130-192)/(78-99) 130/78     Weight: 112.8 kg (248 lb 10.9 oz)  Body mass index is 36.72 kg/m².  No intake or output data in the 24 hours ending 02/10/22 2125   Physical Exam  Vitals and nursing note reviewed.   Constitutional:       General: He is not in acute distress.     Appearance: He is obese. He is ill-appearing. He is not toxic-appearing.   HENT:      Head: Normocephalic and atraumatic.   Cardiovascular:      Rate and Rhythm: Normal rate and regular rhythm.   Pulmonary:      Effort: Pulmonary effort is normal. No respiratory distress.      Comments: Room air  Abdominal:      General: Bowel sounds are normal. There is no distension.       Palpations: Abdomen is soft.      Tenderness: There is no guarding or rebound.   Musculoskeletal:      Right lower leg: No edema.      Left lower leg: No edema.   Skin:     General: Skin is warm and dry.   Neurological:      Mental Status: He is alert and oriented to person, place, and time.         Significant Labs: All pertinent labs within the past 24 hours have been reviewed.    Significant Imaging: I have reviewed all pertinent imaging results/findings within the past 24 hours.

## 2022-02-12 LAB
ANION GAP SERPL CALC-SCNC: 6 MMOL/L (ref 8–16)
BUN SERPL-MCNC: 14 MG/DL (ref 6–20)
CALCIUM SERPL-MCNC: 8.9 MG/DL (ref 8.7–10.5)
CHLORIDE SERPL-SCNC: 107 MMOL/L (ref 95–110)
CO2 SERPL-SCNC: 27 MMOL/L (ref 23–29)
CREAT SERPL-MCNC: 0.8 MG/DL (ref 0.5–1.4)
EST. GFR  (AFRICAN AMERICAN): >60 ML/MIN/1.73 M^2
EST. GFR  (NON AFRICAN AMERICAN): >60 ML/MIN/1.73 M^2
GLUCOSE SERPL-MCNC: 274 MG/DL (ref 70–110)
POCT GLUCOSE: 155 MG/DL (ref 70–110)
POCT GLUCOSE: 203 MG/DL (ref 70–110)
POCT GLUCOSE: 214 MG/DL (ref 70–110)
POCT GLUCOSE: 254 MG/DL (ref 70–110)
POTASSIUM SERPL-SCNC: 3.7 MMOL/L (ref 3.5–5.1)
SODIUM SERPL-SCNC: 140 MMOL/L (ref 136–145)

## 2022-02-12 PROCEDURE — 25000003 PHARM REV CODE 250: Performed by: STUDENT IN AN ORGANIZED HEALTH CARE EDUCATION/TRAINING PROGRAM

## 2022-02-12 PROCEDURE — 63600175 PHARM REV CODE 636 W HCPCS: Performed by: INTERNAL MEDICINE

## 2022-02-12 PROCEDURE — 21400001 HC TELEMETRY ROOM

## 2022-02-12 PROCEDURE — 36415 COLL VENOUS BLD VENIPUNCTURE: CPT | Performed by: HOSPITALIST

## 2022-02-12 PROCEDURE — 80048 BASIC METABOLIC PNL TOTAL CA: CPT | Performed by: HOSPITALIST

## 2022-02-12 PROCEDURE — 25000003 PHARM REV CODE 250: Performed by: INTERNAL MEDICINE

## 2022-02-12 RX ADMIN — LOSARTAN POTASSIUM 100 MG: 25 TABLET, FILM COATED ORAL at 08:02

## 2022-02-12 RX ADMIN — INSULIN ASPART 2 UNITS: 100 INJECTION, SOLUTION INTRAVENOUS; SUBCUTANEOUS at 09:02

## 2022-02-12 RX ADMIN — ENOXAPARIN SODIUM 40 MG: 40 INJECTION SUBCUTANEOUS at 05:02

## 2022-02-12 RX ADMIN — DOXYCYCLINE HYCLATE 100 MG: 100 TABLET, COATED ORAL at 09:02

## 2022-02-12 RX ADMIN — INSULIN ASPART 8 UNITS: 100 INJECTION, SOLUTION INTRAVENOUS; SUBCUTANEOUS at 05:02

## 2022-02-12 RX ADMIN — OXYCODONE 5 MG: 5 TABLET ORAL at 08:02

## 2022-02-12 RX ADMIN — INSULIN ASPART 8 UNITS: 100 INJECTION, SOLUTION INTRAVENOUS; SUBCUTANEOUS at 12:02

## 2022-02-12 RX ADMIN — INSULIN DETEMIR 25 UNITS: 100 INJECTION, SOLUTION SUBCUTANEOUS at 08:02

## 2022-02-12 RX ADMIN — INSULIN ASPART 8 UNITS: 100 INJECTION, SOLUTION INTRAVENOUS; SUBCUTANEOUS at 08:02

## 2022-02-12 RX ADMIN — MELATONIN TAB 3 MG 6 MG: 3 TAB at 11:02

## 2022-02-12 RX ADMIN — DOXYCYCLINE HYCLATE 100 MG: 100 TABLET, COATED ORAL at 08:02

## 2022-02-12 RX ADMIN — PANTOPRAZOLE SODIUM 40 MG: 40 TABLET, DELAYED RELEASE ORAL at 08:02

## 2022-02-12 NOTE — ASSESSMENT & PLAN NOTE
He underwent I&D at [facility] on 2/5. Culture MSSA, blood cultures NGTD. Surrounding cellulitis and purulence on admit  - wound care consulted, recommending general surgery consult  - continue cefazolin now de-escalated to cefpodoxime  - gen surg consulted, US ordered without evidence of abscess   - continue wound care

## 2022-02-12 NOTE — PLAN OF CARE
Problem: Diabetes Comorbidity  Goal: Blood Glucose Level Within Targeted Range  Outcome: Ongoing, Progressing  Intervention: Monitor and Manage Glycemia  Flowsheets (Taken 2/12/2022 2996)  Glycemic Management:   blood glucose monitored   oral hydration promoted   supplemental insulin given

## 2022-02-12 NOTE — PLAN OF CARE
Problem: Adult Inpatient Plan of Care  Goal: Plan of Care Review  Outcome: Ongoing, Progressing     Problem: Infection  Goal: Absence of Infection Signs and Symptoms  Outcome: Ongoing, Progressing     Problem: Diabetes Comorbidity  Goal: Blood Glucose Level Within Targeted Range  Outcome: Ongoing, Progressing     Problem: Impaired Wound Healing  Goal: Optimal Wound Healing  Outcome: Ongoing, Progressing   Pt teaching on keeping his hands clean and dry . Pt teaching on keep giving himself his Insulin why he is in the Hospital. Pt verbalized understanding.All safety measures in use.

## 2022-02-12 NOTE — SUBJECTIVE & OBJECTIVE
Interval History: Patient feeling much better, tolerating PO intake. Appreciate surgery recs - no surgical intervention. US without evidence of abscess so diet resume - stable on PO abx. Anticipate discharge tomorrow.    Review of Systems   Constitutional: Negative for activity change, appetite change, chills, fatigue and fever.   Respiratory: Negative for cough, chest tightness and shortness of breath.    Cardiovascular: Negative for chest pain, palpitations and leg swelling.   Gastrointestinal: Negative for abdominal distention, abdominal pain, constipation, diarrhea, nausea and vomiting.   Genitourinary: Negative for difficulty urinating.   Musculoskeletal: Negative for arthralgias and myalgias.   Skin: Positive for wound.   Neurological: Negative for dizziness, weakness, light-headedness, numbness and headaches.   Psychiatric/Behavioral: Negative for confusion.     Objective:     Vital Signs (Most Recent):  Temp: 97.9 °F (36.6 °C) (02/12/22 1649)  Pulse: 90 (02/12/22 1649)  Resp: 18 (02/12/22 1649)  BP: 133/79 (02/12/22 1649)  SpO2: 99 % (02/12/22 1649) Vital Signs (24h Range):  Temp:  [97.6 °F (36.4 °C)-98.3 °F (36.8 °C)] 97.9 °F (36.6 °C)  Pulse:  [] 90  Resp:  [16-20] 18  SpO2:  [94 %-99 %] 99 %  BP: (115-140)/(70-91) 133/79     Weight: 112.8 kg (248 lb 10.9 oz)  Body mass index is 36.72 kg/m².    Intake/Output Summary (Last 24 hours) at 2/12/2022 1712  Last data filed at 2/12/2022 1200  Gross per 24 hour   Intake 640 ml   Output --   Net 640 ml      Physical Exam  Vitals and nursing note reviewed.   Constitutional:       General: He is not in acute distress.     Appearance: He is obese. He is ill-appearing. He is not toxic-appearing.   HENT:      Head: Normocephalic and atraumatic.   Cardiovascular:      Rate and Rhythm: Normal rate and regular rhythm.   Pulmonary:      Effort: Pulmonary effort is normal. No respiratory distress.      Comments: Room air  Abdominal:      General: Bowel sounds are  normal. There is no distension.      Palpations: Abdomen is soft.      Tenderness: There is no guarding or rebound.   Musculoskeletal:      Right lower leg: No edema.      Left lower leg: No edema.   Skin:     General: Skin is warm and dry.   Neurological:      Mental Status: He is alert and oriented to person, place, and time.         Significant Labs: All pertinent labs within the past 24 hours have been reviewed.    Significant Imaging: I have reviewed all pertinent imaging results/findings within the past 24 hours.

## 2022-02-12 NOTE — PROGRESS NOTES
[facility]: [facility]  Case Management Services    WRITTEN HEALTHCARE DISCHARGE INFORMATION      Things that YOU are RESPONSIBLE for to Manage Your Care At Home:    WRITTEN DISCHARGE INSTURCTIONS  These are your WRITTEN DISCHARGE INSTRUCTIONS from your hospital stay. Please be sure to know and understand that you are responsible for the following, so that you will be able to better manage your care at home:   1.  all medications that have been called in for you or those that were written on prescription paper and provided to you to get filled.  2. BE SURE TO TAKE YOUR MEDICATION AS PRESCRIBED.  3. Be sure to attend your follow-up appointments that were scheduled for you or to schedule any appointments that you will be scheduling.     If you are unable to make your follow up appointments, please call the number listed and reschedule this appointment.      ________[HELP AT HOME]________    Be sure that you will have someone to help you at home during your recovery.     Experiencing any SIGNS or SYMPTOMS: YOU CAN     Schedule a same day appointment with your Primary Care Doctor or  you can call [facility] On Call Nurse Care Line for 24/7 assistance at [phone]     If you are experience any signs or symptoms that have become severe, Call 911 and come to your nearest Emergency Room.     Thank you for choosing [facility] and allowing us to care for you.     From your care management team:   You should receive a call from [facility] Discharge Department within 48-72 hours to help manage your care after discharge. Please try to make sure that you answer your phone for this important phone call.    Your follow-up appointments have been made according to your preferences. The following are your scheduled appointments or those that you will need to schedule for yourself.    FOLLOW-UPS   Follow-up Information     [facility]. Schedule an appointment as soon as possible for a visit in 1  week.    Why: Call to schedule an Out-Patient Hospital Follow-Up in 1 week and to establish medical care  Contact information:  [address] [place] [address]  [phone]

## 2022-02-12 NOTE — PLAN OF CARE
Problem: Diabetic Ketoacidosis  Goal: Fluid and Electrolyte Balance with Absence of Ketosis  Outcome: Ongoing, Progressing     Problem: Impaired Wound Healing  Goal: Optimal Wound Healing  Outcome: Ongoing, Progressing   HS blood sugar 240, required coverage. No new skin issues and no injury during shift. Educated on new medication and verbalized understanding. Bed alarm activated and audible. Call light and urinal at side.

## 2022-02-12 NOTE — ASSESSMENT & PLAN NOTE
This patient does have evidence of infective focus  My overall impression is sepsis. Vital signs were reviewed and noted in progress note.  Antibiotics given-    Antibiotics (From admission, onward)            Start     Stop Route Frequency Ordered    02/10/22 1100  doxycycline tablet 100 mg         -- Oral Every 12 hours 02/10/22 0958        Cultures were taken-   Microbiology Results (last 7 days)     Procedure Component Value Units Date/Time    Blood Culture #2 **CANNOT BE ORDERED STAT** [[id number]] Collected: 02/07/22 2248    Order Status: Completed Specimen: Blood from Peripheral, Forearm, Left Updated: 02/11/22 2303     Blood Culture, Routine No Growth after 4 days.     Blood Culture #1 **CANNOT BE ORDERED STAT** [[id number]] Collected: 02/07/22 2247    Order Status: Completed Specimen: Blood from Peripheral, Forearm, Left Updated: 02/11/22 2303     Blood Culture, Routine No Growth after 4 days.         Latest lactate reviewed, they are-  No results for input(s): LACTATE in the last 72 hours.    Organ dysfunction indicated by DKA  Source- Right thigh abscess and cellulitis to leg    S/p I&D  Wound care consulted  Continue cefpodoxime

## 2022-02-12 NOTE — PROGRESS NOTES
Surgery Progress Note    Chadd Alonzo is a 41 y.o. year old male in hospital for right thigh abscess, previously I&D'd.  No fevers. Feels better. Minimal drainage and there is no packing in the wound.    Ultrasound:  Impression:     Soft tissue edema.  No discrete fluid collections identified to suggest residual abscess cavity.    ROS:  Negative except above    PE:  Vitals:    02/12/22 0422 02/12/22 0759 02/12/22 0822 02/12/22 1131   BP: 127/84 (!) 140/91  125/84   BP Location: Left arm Right arm     Patient Position: Lying Sitting  Lying   Pulse: 89 96  91   Resp: 16 20 18 18   Temp: 97.6 °F (36.4 °C) 97.6 °F (36.4 °C)  98.1 °F (36.7 °C)   TempSrc: Oral Oral  Oral   SpO2: 99% 96%  97%   Weight:       Height:         NAD  Right thigh wound: no surrounding cellulitis. Opening is 1 cm. Serous fluid around the incision. Minimally tender      A/P:  Chadd Alonzo is a 41 y.o. year old male  with right thigh abscess    -no surgical intervention  -ok to discharge on oral antibiotics  -f/u w general surgery clinic, f/u order placed    Hugo Stern  General Surgery - [facility]  2/12/2022  1:02 PM

## 2022-02-12 NOTE — PROGRESS NOTES
[facility]  Telemedicine Progress Note    Patient Name: Chadd Alonzo  MRN: [id number]  Patient Class: IP- Inpatient   Admission Date: 2/7/2022  Length of Stay: 5 days  Attending Physician: Mendez Encinas MD  Primary Care Provider: Primary Doctor No          Subjective:     Principal Problem:Diabetic ketoacidosis without coma associated with type 2 diabetes mellitus        HPI:  Mr. Alonzo is a 42yo man with a past medical history of DM2 x 19 years only on Metformin, and HTN.  He got his covid vaccinations, but is due for a booster.    He now comes to the ED with a boil which started on his right upper thigh this past Thursday (4 days ago).  He went to [facility] ED at [place] on 2/5 and underwent an I&D and was discharged on Clindamycin.  His cultures from that day ultimately grew out MSSA.  He states that since starting the Clindamycin he has had epigastric discomfort and N/V.  He now comes mostly due to progressive malaise, epigastric pain and nausea.  He denies fever or chills.  He does state that despite taking his Clindamycin, his right thigh has remained unchanged (still red, indurated and painful).    In the ED here he was found to have continued right thigh/groin cellulitis (no fasciitis on CT), sepsis, and DKA.  Started cefazolin for MSSA.       Overview/Hospital Course:  Mr Chadd Alonzo is a 41 y.o. man who presented with DKA. Resolved with IV insulin and IVF. Started cefazolin for R thigh abscess s/p I&D with known MSSA and continued induration around the I&D site. Blood cultures NGTD. Stepped down to floor. Glucose control improved. Continues to have nausea.       Interval History: Patient feeling much better, tolerating PO intake. Appreciate surgery recs - no surgical intervention. US without evidence of abscess so diet resume - stable on PO abx. Anticipate discharge tomorrow.    Review of Systems   Constitutional: Negative for activity change, appetite change, chills,  fatigue and fever.   Respiratory: Negative for cough, chest tightness and shortness of breath.    Cardiovascular: Negative for chest pain, palpitations and leg swelling.   Gastrointestinal: Negative for abdominal distention, abdominal pain, constipation, diarrhea, nausea and vomiting.   Genitourinary: Negative for difficulty urinating.   Musculoskeletal: Negative for arthralgias and myalgias.   Skin: Positive for wound.   Neurological: Negative for dizziness, weakness, light-headedness, numbness and headaches.   Psychiatric/Behavioral: Negative for confusion.     Objective:     Vital Signs (Most Recent):  Temp: 97.9 °F (36.6 °C) (02/12/22 1649)  Pulse: 90 (02/12/22 1649)  Resp: 18 (02/12/22 1649)  BP: 133/79 (02/12/22 1649)  SpO2: 99 % (02/12/22 1649) Vital Signs (24h Range):  Temp:  [97.6 °F (36.4 °C)-98.3 °F (36.8 °C)] 97.9 °F (36.6 °C)  Pulse:  [] 90  Resp:  [16-20] 18  SpO2:  [94 %-99 %] 99 %  BP: (115-140)/(70-91) 133/79     Weight: 112.8 kg (248 lb 10.9 oz)  Body mass index is 36.72 kg/m².    Intake/Output Summary (Last 24 hours) at 2/12/2022 1712  Last data filed at 2/12/2022 1200  Gross per 24 hour   Intake 640 ml   Output --   Net 640 ml      Physical Exam  Vitals and nursing note reviewed.   Constitutional:       General: He is not in acute distress.     Appearance: He is obese. He is ill-appearing. He is not toxic-appearing.   HENT:      Head: Normocephalic and atraumatic.   Cardiovascular:      Rate and Rhythm: Normal rate and regular rhythm.   Pulmonary:      Effort: Pulmonary effort is normal. No respiratory distress.      Comments: Room air  Abdominal:      General: Bowel sounds are normal. There is no distension.      Palpations: Abdomen is soft.      Tenderness: There is no guarding or rebound.   Musculoskeletal:      Right lower leg: No edema.      Left lower leg: No edema.   Skin:     General: Skin is warm and dry.   Neurological:      Mental Status: He is alert and oriented to person,  place, and time.         Significant Labs: All pertinent labs within the past 24 hours have been reviewed.    Significant Imaging: I have reviewed all pertinent imaging results/findings within the past 24 hours.      Assessment/Plan:      * Diabetic ketoacidosis without coma associated with type 2 diabetes mellitus  Presented with DKA. That is resolved. Continues to have nausea and poor PO intake- continue IVF and antiemetics  A1c: ordered  Meds: detemir + aspart insulin + SSI PRN to maintain goal 140-180  ADA diet, accuchecks, hypoglycemic protocol        Nausea  - CT abd/pelvis completed  - continue antiemetics  - protonix ordered   - suspect GERD      Sepsis due to methicillin susceptible Staphylococcus aureus (MSSA) without acute organ dysfunction  This patient does have evidence of infective focus  My overall impression is sepsis. Vital signs were reviewed and noted in progress note.  Antibiotics given-    Antibiotics (From admission, onward)            Start     Stop Route Frequency Ordered    02/10/22 1100  doxycycline tablet 100 mg         -- Oral Every 12 hours 02/10/22 0958        Cultures were taken-   Microbiology Results (last 7 days)     Procedure Component Value Units Date/Time    Blood Culture #2 **CANNOT BE ORDERED STAT** [[id number]] Collected: 02/07/22 2248    Order Status: Completed Specimen: Blood from Peripheral, Forearm, Left Updated: 02/11/22 2303     Blood Culture, Routine No Growth after 4 days.     Blood Culture #1 **CANNOT BE ORDERED STAT** [[id number]] Collected: 02/07/22 2247    Order Status: Completed Specimen: Blood from Peripheral, Forearm, Left Updated: 02/11/22 2303     Blood Culture, Routine No Growth after 4 days.         Latest lactate reviewed, they are-  No results for input(s): LACTATE in the last 72 hours.    Organ dysfunction indicated by DKA  Source- Right thigh abscess and cellulitis to leg    S/p I&D  Wound care consulted  Continue cefpodoxime      Essential  hypertension  Not on Rx at home  Start losartan         Abscess of right thigh  He underwent I&D at [place] on 2/5. Culture MSSA, blood cultures NGTD. Surrounding cellulitis and purulence on admit  - wound care consulted, recommending general surgery consult  - continue cefazolin now de-escalated to cefpodoxime  - gen surg consulted, US ordered without evidence of abscess   - continue wound care        VTE Risk Mitigation (From admission, onward)         Ordered     enoxaparin injection 40 mg  Daily         02/07/22 2103     IP VTE HIGH RISK PATIENT  Once         02/07/22 2103     Place WOLF hose  Until discontinued         02/07/22 2103     Place sequential compression device  Until discontinued         02/07/22 2103                      I have assessed these finding virtually using telemed platform and with assistance of bedside nurse                 The attending portion of this evaluation, treatment, and documentation was performed per Mendez Encinas MD via Telemedicine AudioVisual using the secure [employer] software platform with 2 way audio/video. The provider was located off-site and the patient is located in the hospital. The aforementioned video software was utilized to document the relevant history and physical exam    Mendez Encinas MD  Department of Hospital Medicine   [facility]

## 2022-02-13 VITALS
OXYGEN SATURATION: 97 % | SYSTOLIC BLOOD PRESSURE: 155 MMHG | RESPIRATION RATE: 18 BRPM | BODY MASS INDEX: 36.83 KG/M2 | WEIGHT: 248.69 LBS | TEMPERATURE: 98 F | HEART RATE: 105 BPM | HEIGHT: 69 IN | DIASTOLIC BLOOD PRESSURE: 91 MMHG

## 2022-02-13 LAB
ANION GAP SERPL CALC-SCNC: 10 MMOL/L (ref 8–16)
BUN SERPL-MCNC: 12 MG/DL (ref 6–20)
CALCIUM SERPL-MCNC: 8.4 MG/DL (ref 8.7–10.5)
CHLORIDE SERPL-SCNC: 105 MMOL/L (ref 95–110)
CO2 SERPL-SCNC: 25 MMOL/L (ref 23–29)
CREAT SERPL-MCNC: 0.7 MG/DL (ref 0.5–1.4)
EST. GFR  (AFRICAN AMERICAN): >60 ML/MIN/1.73 M^2
EST. GFR  (NON AFRICAN AMERICAN): >60 ML/MIN/1.73 M^2
GLUCOSE SERPL-MCNC: 219 MG/DL (ref 70–110)
POCT GLUCOSE: 170 MG/DL (ref 70–110)
POCT GLUCOSE: 203 MG/DL (ref 70–110)
POTASSIUM SERPL-SCNC: 3.6 MMOL/L (ref 3.5–5.1)
SODIUM SERPL-SCNC: 140 MMOL/L (ref 136–145)

## 2022-02-13 PROCEDURE — 25000003 PHARM REV CODE 250: Performed by: STUDENT IN AN ORGANIZED HEALTH CARE EDUCATION/TRAINING PROGRAM

## 2022-02-13 PROCEDURE — 63600175 PHARM REV CODE 636 W HCPCS: Performed by: INTERNAL MEDICINE

## 2022-02-13 PROCEDURE — 36415 COLL VENOUS BLD VENIPUNCTURE: CPT | Performed by: HOSPITALIST

## 2022-02-13 PROCEDURE — 0004A HC IMMUNIZ ADMIN, SARS-COV-2 COVID-19 VACC, 30MCG/0.3ML, BOOSTER DOSE: CPT | Performed by: STUDENT IN AN ORGANIZED HEALTH CARE EDUCATION/TRAINING PROGRAM

## 2022-02-13 PROCEDURE — 91300 PHARM REV CODE 636 W HCPCS: CPT | Performed by: STUDENT IN AN ORGANIZED HEALTH CARE EDUCATION/TRAINING PROGRAM

## 2022-02-13 PROCEDURE — 63600175 PHARM REV CODE 636 W HCPCS: Performed by: STUDENT IN AN ORGANIZED HEALTH CARE EDUCATION/TRAINING PROGRAM

## 2022-02-13 PROCEDURE — 99223 PR INITIAL HOSPITAL CARE,LEVL III: ICD-10-PCS | Mod: ,,, | Performed by: SURGERY

## 2022-02-13 PROCEDURE — 80048 BASIC METABOLIC PNL TOTAL CA: CPT | Performed by: HOSPITALIST

## 2022-02-13 PROCEDURE — 99223 1ST HOSP IP/OBS HIGH 75: CPT | Mod: ,,, | Performed by: SURGERY

## 2022-02-13 RX ORDER — INSULIN ASPART 100 [IU]/ML
1-10 INJECTION, SOLUTION INTRAVENOUS; SUBCUTANEOUS
Qty: 20 ML | Refills: 0 | Status: SHIPPED | OUTPATIENT
Start: 2022-02-13 | End: 2022-05-14

## 2022-02-13 RX ORDER — SYRINGE AND NEEDLE,INSULIN,1ML 31GX15/64"
1 SYRINGE, EMPTY DISPOSABLE MISCELLANEOUS
Qty: 120 EACH | Refills: 3 | Status: SHIPPED | OUTPATIENT
Start: 2022-02-13 | End: 2023-02-13

## 2022-02-13 RX ORDER — DOXYCYCLINE HYCLATE 100 MG
100 TABLET ORAL EVERY 12 HOURS
Qty: 14 TABLET | Refills: 0 | Status: SHIPPED | OUTPATIENT
Start: 2022-02-13 | End: 2022-02-20

## 2022-02-13 RX ORDER — PEN NEEDLE, DIABETIC 30 GX3/16"
1 NEEDLE, DISPOSABLE MISCELLANEOUS 2 TIMES DAILY WITH MEALS
Qty: 100 EACH | Refills: 11 | Status: SHIPPED | OUTPATIENT
Start: 2022-02-13

## 2022-02-13 RX ORDER — LANCETS
1 EACH MISCELLANEOUS 2 TIMES DAILY WITH MEALS
Qty: 100 EACH | Refills: 11 | Status: SHIPPED | OUTPATIENT
Start: 2022-02-13

## 2022-02-13 RX ORDER — PANTOPRAZOLE SODIUM 40 MG/1
40 TABLET, DELAYED RELEASE ORAL DAILY
Qty: 30 TABLET | Refills: 11 | Status: SHIPPED | OUTPATIENT
Start: 2022-02-14 | End: 2023-08-10

## 2022-02-13 RX ORDER — LANCING DEVICE
1 EACH MISCELLANEOUS 2 TIMES DAILY WITH MEALS
Qty: 1 EACH | Refills: 0 | Status: SHIPPED | OUTPATIENT
Start: 2022-02-13 | End: 2023-02-13

## 2022-02-13 RX ORDER — LOSARTAN POTASSIUM 100 MG/1
100 TABLET ORAL DAILY
Qty: 90 TABLET | Refills: 3 | Status: ON HOLD | OUTPATIENT
Start: 2022-02-14 | End: 2022-10-13 | Stop reason: HOSPADM

## 2022-02-13 RX ORDER — INSULIN ASPART 100 [IU]/ML
8 INJECTION, SOLUTION INTRAVENOUS; SUBCUTANEOUS 3 TIMES DAILY
Qty: 21.6 ML | Refills: 3 | Status: SHIPPED | OUTPATIENT
Start: 2022-02-13 | End: 2023-08-10

## 2022-02-13 RX ORDER — INSULIN PUMP SYRINGE, 3 ML
EACH MISCELLANEOUS
Qty: 1 EACH | Refills: 0 | Status: SHIPPED | OUTPATIENT
Start: 2022-02-13 | End: 2023-02-13

## 2022-02-13 RX ADMIN — INSULIN ASPART 8 UNITS: 100 INJECTION, SOLUTION INTRAVENOUS; SUBCUTANEOUS at 01:02

## 2022-02-13 RX ADMIN — BNT162B2 0.3 ML: 0.23 INJECTION, SUSPENSION INTRAMUSCULAR at 03:02

## 2022-02-13 RX ADMIN — PANTOPRAZOLE SODIUM 40 MG: 40 TABLET, DELAYED RELEASE ORAL at 08:02

## 2022-02-13 RX ADMIN — INSULIN DETEMIR 25 UNITS: 100 INJECTION, SOLUTION SUBCUTANEOUS at 08:02

## 2022-02-13 RX ADMIN — LOSARTAN POTASSIUM 100 MG: 25 TABLET, FILM COATED ORAL at 08:02

## 2022-02-13 RX ADMIN — ONDANSETRON 4 MG: 2 INJECTION INTRAMUSCULAR; INTRAVENOUS at 03:02

## 2022-02-13 RX ADMIN — DOXYCYCLINE HYCLATE 100 MG: 100 TABLET, COATED ORAL at 08:02

## 2022-02-13 RX ADMIN — INSULIN ASPART 8 UNITS: 100 INJECTION, SOLUTION INTRAVENOUS; SUBCUTANEOUS at 08:02

## 2022-02-13 NOTE — PLAN OF CARE
02/13/22 1203   Post-Acute Status   Post-Acute Authorization Other  (Home; follow-ups)   Coverage Medicaid: [employer]   Other Status No Post-Acute Service Needs   Hospital Resources/Appts/Education Provided Provided patient/caregiver with written discharge plan information;Appointments scheduled and added to AVS;Appointments scheduled by Navigator/Coordinator;Provided education on problems/symptoms using teachback   Discharge Delays None known at this time   Discharge Plan   Discharge Plan A Home with family  (Follow-ups)   Discharge Plan B Home with family  (Follow-ups)

## 2022-02-13 NOTE — PLAN OF CARE
Problem: Diabetic Ketoacidosis  Goal: Fluid and Electrolyte Balance with Absence of Ketosis  Outcome: Ongoing, Progressing   Coverage provided for HS blood sugar of 214. Education about diabetic and heart healthy diet done and verbalized understanding.

## 2022-02-13 NOTE — PLAN OF CARE
Problem: Diabetic Ketoacidosis  Goal: Fluid and Electrolyte Balance with Absence of Ketosis  Outcome: Met     Problem: Adult Inpatient Plan of Care  Goal: Plan of Care Review  Outcome: Met  Goal: Patient-Specific Goal (Individualized)  Outcome: Met  Goal: Absence of Hospital-Acquired Illness or Injury  Outcome: Met  Goal: Optimal Comfort and Wellbeing  Outcome: Met  Goal: Readiness for Transition of Care  Outcome: Met     Problem: Infection  Goal: Absence of Infection Signs and Symptoms  Outcome: Met     Problem: Diabetes Comorbidity  Goal: Blood Glucose Level Within Targeted Range  Outcome: Met     Problem: Impaired Wound Healing  Goal: Optimal Wound Healing  Outcome: Met

## 2022-02-13 NOTE — PROGRESS NOTES
Patient is ready and clear for discharge from Case Management perspective; informed patients nurse, Chelo and discussed discharge. Reviewed with and provided patient with Written Discharge Instructions.

## 2022-02-13 NOTE — PLAN OF CARE
[facility]  Discharge Final Note    Primary Care Provider: [facility] [place]    Expected Discharge Date: 2/13/2022    Final Discharge Note (most recent)     Final Note - 02/13/22 1204        Final Note    Assessment Type Final Discharge Note     Anticipated Discharge Disposition Home or Self Care     What phone number can be called within the next 1-3 days to see how you are doing after discharge? --   [phone]    Hospital Resources/Appts/Education Provided Appointments scheduled by Navigator/Coordinator;Provided education on problems/symptoms using teachback;Provided patient/caregiver with written discharge plan information;Appointments scheduled and added to AVS        Post-Acute Status    Post-Acute Authorization Other   Home; follow-ups    Coverage Medicaid: [employer]     Other Status No Post-Acute Service Needs     Discharge Delays None known at this time                 Important Message from Medicare             Contact Info     [facility] [place]   Relationship: PCP - General    [address] [place] [address]   Phone: [phone]       Next Steps: Schedule an appointment as soon as possible for a visit in 1 week(s)    Instructions: Call to schedule an Out-Patient Hospital Follow-Up in 1 week and to establish medical care    Hugo Stern MD   Specialty: General Surgery, Surgery    [address] [place] [address]   Phone: [phone]       Next Steps: Call in 10 day(s)    Instructions: For wound re-check

## 2022-02-13 NOTE — PROGRESS NOTES
[facility]: [facility]  Case Management Services    WRITTEN HEALTHCARE DISCHARGE INFORMATION      Things that YOU are RESPONSIBLE for to Manage Your Care At Home:    WRITTEN DISCHARGE INSTURCTIONS  These are your WRITTEN DISCHARGE INSTRUCTIONS from your hospital stay. Please be sure to know and understand that you are responsible for the following, so that you will be able to better manage your care at home:   1.  all medications that have been called in for you or those that were written on prescription paper and provided to you to get filled.  2. BE SURE TO TAKE YOUR MEDICATION AS PRESCRIBED.  3. Be sure to attend your follow-up appointments that were scheduled for you or to schedule any appointments that you will be scheduling.     If you are unable to make your follow up appointments, please call the number listed and reschedule this appointment.      ________[HELP AT HOME]________    Be sure that you will have someone to help you at home during your recovery.     Experiencing any SIGNS or SYMPTOMS: YOU CAN     Schedule a same day appointment with your Primary Care Doctor or  you can call [facility] On Call Nurse Care Line for 24/7 assistance at [phone]     If you are experience any signs or symptoms that have become severe, Call 911 and come to your nearest Emergency Room.     Thank you for choosing [facility] and allowing us to care for you.     From your care management team:   You should receive a call from [facility] Discharge Department within 48-72 hours to help manage your care after discharge. Please try to make sure that you answer your phone for this important phone call.    Your follow-up appointments have been made according to your preferences. The following are your scheduled appointments or those that you will need to schedule for yourself.    FOLLOW-UPS   Follow-up Information     [facility]. Schedule an appointment as soon as possible for a visit in 1  week.    Why: Call to schedule an Out-Patient Hospital Follow-Up in 1 week and to establish medical care  Contact information:  [address] [place] [address]  [phone]             Hugo Stern MD. Call in 10 days.    Specialties: General Surgery, Surgery  Why: For wound re-check  Contact information:  [address] [facility] [address] [place] [id number]  [phone]

## 2022-03-02 NOTE — DISCHARGE SUMMARY
[facility] Medicine  Discharge Summary      Patient Name: Chadd Alonzo  MRN: [id number]  Patient Class: IP- Inpatient  Admission Date: 2/7/2022  Hospital Length of Stay: 6 days  Discharge Date and Time: 2/13/2022  6:01 PM  Attending Physician: No att. providers found   Discharging Provider: Mendez Encinas MD  Primary Care Provider: [facility]      HPI:   Mr. Alonzo is a 40yo man with a past medical history of DM2 x 19 years only on Metformin, and HTN.  He got his covid vaccinations, but is due for a booster.    He now comes to the ED with a boil which started on his right upper thigh this past Thursday (4 days ago).  He went to [facility] ED [facility] [place] on 2/5 and underwent an I&D and was discharged on Clindamycin.  His cultures from that day ultimately grew out MSSA.  He states that since starting the Clindamycin he has had epigastric discomfort and N/V.  He now comes mostly due to progressive malaise, epigastric pain and nausea.  He denies fever or chills.  He does state that despite taking his Clindamycin, his right thigh has remained unchanged (still red, indurated and painful).    In the ED here he was found to have continued right thigh/groin cellulitis (no fasciitis on CT), sepsis, and DKA.  Started cefazolin for MSSA.       * No surgery found *      Hospital Course:   Mr Chadd Alonzo is a 41 y.o. man who presented with DKA. Resolved with IV insulin and IVF. Started cefazolin for R thigh abscess s/p I&D with known MSSA and continued induration around the I&D site. Blood cultures NGTD. Stepped down to floor. Glucose control improved. General surgery consulted, US obtained and no abscess, no surgical intervention. Patient transitioned to PO abx and insulin regimen adjusted. He was discharged home in stable condition on insulin and PO abx with PCP, endocrinology, and general surgery follow up.        Goals of Care Treatment Preferences:  Code Status: Full Code      Consults:    Consults (From admission, onward)        Status Ordering Provider     Inpatient consult to General Surgery  Once        Provider:  Dre Vail MD    Completed RUTH WYLIE     Inpatient virtual consult to Hospital Medicine  Once        Provider:  (Not yet assigned)    Completed RO MARQUEZ     Inpatient consult to Registered Dietitian/Nutritionist  Once        Provider:  (Not yet assigned)    Completed ALEXANDER BANKS          No new Assessment & Plan notes have been filed under this hospital service since the last note was generated.  Service: Hospital Medicine    Final Active Diagnoses:    Diagnosis Date Noted POA    PRINCIPAL PROBLEM:  Diabetic ketoacidosis without coma associated with type 2 diabetes mellitus [E11.10] 02/07/2022 Yes    Nausea [R11.0] 02/09/2022 Yes    Sepsis due to methicillin susceptible Staphylococcus aureus (MSSA) without acute organ dysfunction [A41.01] 02/08/2022 Yes    Abscess of right thigh [L02.415] 02/07/2022 Yes    Essential hypertension [I10] 02/07/2022 Yes      Problems Resolved During this Admission:    Diagnosis Date Noted Date Resolved POA    Sepsis [A41.9] 02/07/2022 02/08/2022 Yes       Discharged Condition: stable    Disposition: Home or Self Care    Follow Up:   Follow-up Information     [facility]. Schedule an appointment as soon as possible for a visit in 1 week.    Why: Call to schedule an Out-Patient Hospital Follow-Up in 1 week and to establish medical care  Contact information:  [address] [place] [facility]  [phone]             Hugo Stern MD. Call in 10 days.    Specialties: General Surgery, Surgery  Why: For wound re-check  Contact information:  [address] [place] [facility]  [phone]                       Patient Instructions:      Diet diabetic       Significant Diagnostic Studies: Labs: All labs within the past 24 hours have been reviewed    Pending Diagnostic Studies:     None        "  Medications:  Reconciled Home Medications:      Medication List      START taking these medications    blood sugar diagnostic Strp  1 strip by Misc.(Non-Drug; Combo Route) route 2 (two) times daily with meals.     blood-glucose meter kit  Use as instructed     * insulin aspart U-100 100 unit/mL (3 mL) Inpn pen  Commonly known as: NovoLOG  Inject 8 Units into the skin 3 (three) times daily.     * insulin aspart U-100 100 unit/mL (3 mL) Inpn pen  Commonly known as: NovoLOG  Inject 1-10 Units into the skin before meals and at bedtime as needed (Hyperglycemia).     insulin detemir U-100 100 unit/mL (3 mL) Inpn pen  Commonly known as: Levemir FLEXTOUCH  Inject 25 Units into the skin once daily.     insulin syringe-needle U-100 1 mL 31 gauge x 15/64" Syrg  1 Syringe by Misc.(Non-Drug; Combo Route) route 3 (three) times daily with meals.     lancets Misc  1 lancet by Misc.(Non-Drug; Combo Route) route 2 (two) times daily with meals.     lancing device Misc  1 Device by Misc.(Non-Drug; Combo Route) route 2 (two) times daily with meals.     losartan 100 MG tablet  Commonly known as: COZAAR  Take 1 tablet (100 mg total) by mouth once daily.     pantoprazole 40 MG tablet  Commonly known as: PROTONIX  Take 1 tablet (40 mg total) by mouth once daily.     pen needle, diabetic 31 gauge x 5/16" Ndle  1 each by Misc.(Non-Drug; Combo Route) route 2 (two) times daily with meals.         * This list has 2 medication(s) that are the same as other medications prescribed for you. Read the directions carefully, and ask your doctor or other care provider to review them with you.            CONTINUE taking these medications    acetaminophen 500 MG tablet  Commonly known as: TYLENOL  Take 1 tablet (500 mg total) by mouth every 6 (six) hours as needed for Pain (As needed for pain and fever).     ibuprofen 600 MG tablet  Commonly known as: ADVIL,MOTRIN  Take 1 tablet (600 mg total) by mouth every 6 (six) hours as needed for Pain (Take with " food as needed for mild-to-moderate pain).     LIDOcaine HCl 2% 2 % Soln  Commonly known as: XYLOCAINE  by Mucous Membrane route every 3 (three) hours as needed (As needed for sore throat). Apply 1 tsp to painful area every 3 hr as needed for pain        STOP taking these medications    clindamycin 150 MG capsule  Commonly known as: CLEOCIN     metFORMIN 500 MG tablet  Commonly known as: GLUCOPHAGE     oxyCODONE-acetaminophen 5-325 mg per tablet  Commonly known as: PERCOCET        ASK your doctor about these medications    doxycycline 100 MG tablet  Commonly known as: VIBRA-TABS  Take 1 tablet (100 mg total) by mouth every 12 (twelve) hours. for 7 days  Ask about: Should I take this medication?            Indwelling Lines/Drains at time of discharge:   Lines/Drains/Airways     None                 Time spent on the discharge of patient: > 35 minutes         The attending portion of this evaluation, treatment, and documentation was performed per Mendez Encinas MD via Telemedicine AudioVisual using the secure [employer] software platform with 2 way audio/video. The provider was located off-site and the patient is located in the hospital. The aforementioned video software was utilized to document the relevant history and physical exam    Mendez Encinas MD  Department of Hospital Medicine  [facility]

## 2022-04-18 NOTE — PROGRESS NOTES
"Subjective:      Chadd Alonzo is a 42 y.o. male who was self-referred for evaluation of ED.      Erectile Dysfunction  Patient complains of erectile dysfunction. Onset of dysfunction was several months ago and was gradual in onset.  Patient states the nature of difficulty is maintaining erection. Full erections occur never. Partial erections occur with intercourse and with masturbation. Libido is not affected. Risk factors for ED include diabetes mellitus and antihypertensive medications. Patient denies history of cranial, spinal, or pelvic trauma, pelvic radiation and hypogonadism. Previous treatment of ED includes sildenafil.    The following portions of the patient's history were reviewed and updated as appropriate: allergies, current medications, past family history, past medical history, past social history, past surgical history and problem list.    Review of Systems  Constitutional: no fever or chills  ENT: no nasal congestion or sore throat  Respiratory: no cough or shortness of breath  Cardiovascular: no chest pain or palpitations  Gastrointestinal: no nausea or vomiting, tolerating diet  Genitourinary: as per HPI  Hematologic/Lymphatic: no easy bruising or lymphadenopathy  Musculoskeletal: no arthralgias or myalgias  Neurological: no seizures or tremors  Behavioral/Psych: no auditory or visual hallucinations     Objective:   Vitals: Ht 5' 9" (1.753 m)   Wt 118.6 kg (261 lb 5.7 oz)   BMI 38.60 kg/m²     Physical Exam   General: alert and oriented, no acute distress  Head: normocephalic, atraumatic  Neck: supple, no lymphadenopathy, normal ROM, no masses  Respiratory: Symmetric expansion, non-labored breathing  Cardiovascular: regular rate and rhythm, nomal pulses, no peripheral edema  Abdomen: soft, non tender  Skin: normal coloration and turgor, no rashes, no suspicious skin lesions noted  Neuro: alert and oriented x3, no gross deficits  Psych: normal judgment and insight, normal mood/affect and " non-anxious    Physical Exam    Lab Review   Urinalysis demonstrates no specimen   Lab Results   Component Value Date    WBC 11.72 02/10/2022    HGB 13.2 (L) 02/10/2022    HCT 39.7 (L) 02/10/2022    HCT 51 07/11/2018    MCV 85 02/10/2022     (H) 02/10/2022     Lab Results   Component Value Date    CREATININE 0.7 02/13/2022    BUN 12 02/13/2022     No results found for: PSA      Assessment and Plan:   1. Erectile dysfunction, unspecified erectile dysfunction type  Reviewed pathophysiology and differential diagnosis of erectile dysfunction with the patient. Treatment options, including relative risks and benefits, were discussed. All questions were answered.  - tadalafiL (CIALIS) 20 MG Tab; Take 1 tablet (20 mg total) by mouth Every 3 (three) days. for 10 days  Dispense: 12 tablet; Refill: 11  --rtc in 1 month     This note is dictated on M*Modal word recognition program.  There are word recognition mistakes that are occasionally missed on review.

## 2022-04-19 ENCOUNTER — OFFICE VISIT (OUTPATIENT)
Dept: UROLOGY | Facility: CLINIC | Age: 42
End: 2022-04-19
Payer: MEDICAID

## 2022-04-19 VITALS — BODY MASS INDEX: 38.71 KG/M2 | WEIGHT: 261.38 LBS | HEIGHT: 69 IN

## 2022-04-19 DIAGNOSIS — N52.9 ERECTILE DYSFUNCTION, UNSPECIFIED ERECTILE DYSFUNCTION TYPE: Primary | ICD-10-CM

## 2022-04-19 PROCEDURE — 99214 OFFICE O/P EST MOD 30 MIN: CPT | Mod: PBBFAC,PN | Performed by: NURSE PRACTITIONER

## 2022-04-19 PROCEDURE — 3008F PR BODY MASS INDEX (BMI) DOCUMENTED: ICD-10-PCS | Mod: CPTII,,, | Performed by: NURSE PRACTITIONER

## 2022-04-19 PROCEDURE — 99203 PR OFFICE/OUTPT VISIT, NEW, LEVL III, 30-44 MIN: ICD-10-PCS | Mod: S$PBB,,, | Performed by: NURSE PRACTITIONER

## 2022-04-19 PROCEDURE — 3046F HEMOGLOBIN A1C LEVEL >9.0%: CPT | Mod: CPTII,,, | Performed by: NURSE PRACTITIONER

## 2022-04-19 PROCEDURE — 4010F PR ACE/ARB THEARPY RXD/TAKEN: ICD-10-PCS | Mod: CPTII,,, | Performed by: NURSE PRACTITIONER

## 2022-04-19 PROCEDURE — 1159F MED LIST DOCD IN RCRD: CPT | Mod: CPTII,,, | Performed by: NURSE PRACTITIONER

## 2022-04-19 PROCEDURE — 1160F PR REVIEW ALL MEDS BY PRESCRIBER/CLIN PHARMACIST DOCUMENTED: ICD-10-PCS | Mod: CPTII,,, | Performed by: NURSE PRACTITIONER

## 2022-04-19 PROCEDURE — 1159F PR MEDICATION LIST DOCUMENTED IN MEDICAL RECORD: ICD-10-PCS | Mod: CPTII,,, | Performed by: NURSE PRACTITIONER

## 2022-04-19 PROCEDURE — 3046F PR MOST RECENT HEMOGLOBIN A1C LEVEL > 9.0%: ICD-10-PCS | Mod: CPTII,,, | Performed by: NURSE PRACTITIONER

## 2022-04-19 PROCEDURE — 3008F BODY MASS INDEX DOCD: CPT | Mod: CPTII,,, | Performed by: NURSE PRACTITIONER

## 2022-04-19 PROCEDURE — 99203 OFFICE O/P NEW LOW 30 MIN: CPT | Mod: S$PBB,,, | Performed by: NURSE PRACTITIONER

## 2022-04-19 PROCEDURE — 99999 PR PBB SHADOW E&M-EST. PATIENT-LVL IV: CPT | Mod: PBBFAC,,, | Performed by: NURSE PRACTITIONER

## 2022-04-19 PROCEDURE — 4010F ACE/ARB THERAPY RXD/TAKEN: CPT | Mod: CPTII,,, | Performed by: NURSE PRACTITIONER

## 2022-04-19 PROCEDURE — 99999 PR PBB SHADOW E&M-EST. PATIENT-LVL IV: ICD-10-PCS | Mod: PBBFAC,,, | Performed by: NURSE PRACTITIONER

## 2022-04-19 PROCEDURE — 1160F RVW MEDS BY RX/DR IN RCRD: CPT | Mod: CPTII,,, | Performed by: NURSE PRACTITIONER

## 2022-04-19 RX ORDER — TADALAFIL 20 MG/1
20 TABLET ORAL
Qty: 12 TABLET | Refills: 11 | Status: SHIPPED | OUTPATIENT
Start: 2022-04-19 | End: 2022-09-26

## 2022-04-19 RX ORDER — DULAGLUTIDE 0.75 MG/.5ML
0.75 INJECTION, SOLUTION SUBCUTANEOUS
COMMUNITY
Start: 2022-04-04

## 2022-04-19 RX ORDER — ROSUVASTATIN CALCIUM 10 MG/1
10 TABLET, COATED ORAL DAILY
COMMUNITY
Start: 2022-02-28

## 2022-05-17 NOTE — PROGRESS NOTES
Established Patient - Audio Only Telehealth Visit     The patient location is: [place]  The chief complaint leading to consultation is:medication assessment   Visit type: Virtual visit with audio only (telephone)  Total time spent with patient: 15 minutes        The reason for the audio only service rather than synchronous audio and video virtual visit was related to technical difficulties or patient preference/necessity.     Each patient to whom I provide medical services by telemedicine is:  (1) informed of the relationship between the physician and patient and the respective role of any other health care provider with respect to management of the patient; and (2) notified that they may decline to receive medical services by telemedicine and may withdraw from such care at any time. Patient verbally consented to receive this service via voice-only telephone call.       HPI: Erectile Dysfunction  Patient complains of erectile dysfunction. Onset of dysfunction was several months ago and was gradual in onset.  Patient states the nature of difficulty is maintaining erection. Full erections occur never. Partial erections occur with intercourse and with masturbation. Libido is not affected. Risk factors for ED include diabetes mellitus and antihypertensive medications. Patient denies history of cranial, spinal, or pelvic trauma, pelvic radiation and hypogonadism. Previous treatment of ED includes sildenafil.     He was prescribed cialis at previous visit. Reports good results; had HA with second dose, no other issues  Assessment and plan:    --continue cialis  --RTC 1 year or PRN        This note is dictated on M*Modal word recognition program.  There are word recognition mistakes that are occasionally missed on review.                       This service was not originating from a related E/M service provided within the previous 7 days nor will  to an E/M service or procedure within the next 24 hours or my soonest  available appointment.  Prevailing standard of care was able to be met in this audio-only visit.

## 2022-05-19 ENCOUNTER — OFFICE VISIT (OUTPATIENT)
Dept: UROLOGY | Facility: CLINIC | Age: 42
End: 2022-05-19
Payer: MEDICAID

## 2022-05-19 DIAGNOSIS — N52.9 ERECTILE DYSFUNCTION, UNSPECIFIED ERECTILE DYSFUNCTION TYPE: Primary | ICD-10-CM

## 2022-05-19 PROCEDURE — 4010F PR ACE/ARB THEARPY RXD/TAKEN: ICD-10-PCS | Mod: CPTII,95,, | Performed by: NURSE PRACTITIONER

## 2022-05-19 PROCEDURE — 3046F HEMOGLOBIN A1C LEVEL >9.0%: CPT | Mod: CPTII,95,, | Performed by: NURSE PRACTITIONER

## 2022-05-19 PROCEDURE — 3046F PR MOST RECENT HEMOGLOBIN A1C LEVEL > 9.0%: ICD-10-PCS | Mod: CPTII,95,, | Performed by: NURSE PRACTITIONER

## 2022-05-19 PROCEDURE — 1160F PR REVIEW ALL MEDS BY PRESCRIBER/CLIN PHARMACIST DOCUMENTED: ICD-10-PCS | Mod: CPTII,95,, | Performed by: NURSE PRACTITIONER

## 2022-05-19 PROCEDURE — 1159F MED LIST DOCD IN RCRD: CPT | Mod: CPTII,95,, | Performed by: NURSE PRACTITIONER

## 2022-05-19 PROCEDURE — 1160F RVW MEDS BY RX/DR IN RCRD: CPT | Mod: CPTII,95,, | Performed by: NURSE PRACTITIONER

## 2022-05-19 PROCEDURE — 99212 OFFICE O/P EST SF 10 MIN: CPT | Mod: 95,,, | Performed by: NURSE PRACTITIONER

## 2022-05-19 PROCEDURE — 4010F ACE/ARB THERAPY RXD/TAKEN: CPT | Mod: CPTII,95,, | Performed by: NURSE PRACTITIONER

## 2022-05-19 PROCEDURE — 99212 PR OFFICE/OUTPT VISIT, EST, LEVL II, 10-19 MIN: ICD-10-PCS | Mod: 95,,, | Performed by: NURSE PRACTITIONER

## 2022-05-19 PROCEDURE — 1159F PR MEDICATION LIST DOCUMENTED IN MEDICAL RECORD: ICD-10-PCS | Mod: CPTII,95,, | Performed by: NURSE PRACTITIONER

## 2022-08-06 ENCOUNTER — HOSPITAL ENCOUNTER (EMERGENCY)
Facility: HOSPITAL | Age: 42
Discharge: HOME | End: 2022-08-06
Attending: EMERGENCY MEDICINE
Payer: MEDICAID

## 2022-08-06 VITALS
RESPIRATION RATE: 18 BRPM | OXYGEN SATURATION: 98 % | SYSTOLIC BLOOD PRESSURE: 160 MMHG | WEIGHT: 268 LBS | HEART RATE: 96 BPM | TEMPERATURE: 99 F | BODY MASS INDEX: 39.69 KG/M2 | HEIGHT: 69 IN | DIASTOLIC BLOOD PRESSURE: 100 MMHG

## 2022-08-06 DIAGNOSIS — R73.9 HYPERGLYCEMIA: ICD-10-CM

## 2022-08-06 DIAGNOSIS — R20.2 TINGLING OF BOTH FEET: Primary | ICD-10-CM

## 2022-08-06 DIAGNOSIS — I10 ELEVATED BLOOD PRESSURE READING WITH DIAGNOSIS OF HYPERTENSION: ICD-10-CM

## 2022-08-06 LAB — POCT GLUCOSE: 318 MG/DL (ref 70–110)

## 2022-08-06 PROCEDURE — 82962 GLUCOSE BLOOD TEST: CPT | Mod: ER

## 2022-08-06 PROCEDURE — 99283 EMERGENCY DEPT VISIT LOW MDM: CPT | Mod: 25,ER

## 2022-08-06 RX ORDER — GABAPENTIN 100 MG/1
100 CAPSULE ORAL NIGHTLY
Qty: 30 CAPSULE | Refills: 0 | Status: SHIPPED | OUTPATIENT
Start: 2022-08-06 | End: 2023-08-10

## 2022-08-07 NOTE — ED PROVIDER NOTES
Encounter Date: 8/6/2022    SCRIBE #1 NOTE: I, Flaca Meneses, am scribing for, and in the presence of,  Aristeo Tellez MD. I have scribed the following portions of the note - Other sections scribed: HPI, ROS, PE.       History     Chief Complaint   Patient presents with    Foot Pain     Pt reports bilateral foot pain and numbness x 4 days. States he has been wearing a new pair of shoes x 2 weeks and is on his feet a lot at work.     Chadd Alonzo 42 y.o. male, with DM (on Insulin) and HTN, presents to the ED with acute foot pain in both feet onset 4 days ago. Patient reports feeling more pain in the left foot. Patient reports that there is pain in the first digit of his left foot onset 2 days ago. Patient also reports tingling under both feet at night. Patient denies any accidents or injuries. Patient denies back pain, trouble standing or walking, or other associated symptoms. Patient states that his blood sugar has been around the 200s. Patient reports that he has been a diabetic for over 10 years and has never seen a podiatrist. Patient states that he has never had a foot or eye exam done. Patient reports wearing new tennis shoes for about two weeks and states that there is no foot support. Patient reports occasional tobacco use.     The history is provided by the patient. No  was used.     Review of patient's allergies indicates:  No Known Allergies  Past Medical History:   Diagnosis Date    Diabetes mellitus     Hypertension      No past surgical history on file.  No family history on file.  Social History     Tobacco Use    Smoking status: Never Smoker    Smokeless tobacco: Never Used   Substance Use Topics    Alcohol use: No    Drug use: No     Review of Systems   Musculoskeletal: Positive for arthralgias. Negative for back pain, gait problem and joint swelling.   Skin: Negative for rash and wound.   Neurological: Positive for numbness. Negative for weakness.   All other systems reviewed  and are negative.      Physical Exam     Initial Vitals [08/06/22 1822]   BP Pulse Resp Temp SpO2   (!) 160/100 96 18 98.7 °F (37.1 °C) 98 %      MAP       --         Physical Exam    Nursing note and vitals reviewed.  Constitutional: He appears well-developed and well-nourished. He is not diaphoretic. No distress.   HENT:   Head: Normocephalic and atraumatic.   Mouth/Throat: Oropharynx is clear and moist. No oropharyngeal exudate.   Eyes: EOM are normal. Pupils are equal, round, and reactive to light. No scleral icterus.   Neck: Neck supple.   Normal range of motion.  Cardiovascular: Normal rate, regular rhythm and intact distal pulses.   No murmur heard.  Pulmonary/Chest: Breath sounds normal. No stridor. No respiratory distress. He has no wheezes. He has no rhonchi. He exhibits no tenderness.   Abdominal: Abdomen is soft. Bowel sounds are normal. There is no abdominal tenderness.   Musculoskeletal:         General: No edema. Normal range of motion.      Cervical back: Normal range of motion and neck supple.      Right foot: No bony tenderness.      Left foot: No bony tenderness.      Comments: 2+ DP pulses. No wounds, erythema, edema, rash or tenderness on feet.  Tingling sensation localized across the plantar aspect of mid foot along the MP joint bilaterally. Sensation intact to light touch in these areas.    There is tenderness to left big toe around the toe nail medially. No erythema, edema, or fluctuance. ROM normal.     Neurological: He is alert and oriented to person, place, and time. He has normal strength.   Skin: Skin is warm. Capillary refill takes less than 2 seconds. No pallor.   Psychiatric: He has a normal mood and affect.         ED Course   Procedures  Labs Reviewed   POCT GLUCOSE - Abnormal; Notable for the following components:       Result Value    POCT Glucose 318 (*)     All other components within normal limits          Imaging Results    None          Medications - No data to  display  Medical Decision Making:   History:   Old Medical Records: I decided to obtain old medical records.  Clinical Tests:   Lab Tests: Ordered and Reviewed    Labs Reviewed      Admission on 08/06/2022, Discharged on 08/06/2022   Component Date Value Ref Range Status    POCT Glucose 08/06/2022 318 (A) 70 - 110 mg/dL Final        Imaging Reviewed    Imaging Results    None         Medications given in ED    Medications - No data to display      Note was created using voice recognition software. Note may have occasional typographical errors that may not have been identified and edited despite good destinee initial review prior to signing.    I, Aristeo Tellez MD, personally performed the services described in this documentation. All medical record entries made by the scribe were at my direction and in my presence.  I have reviewed the chart and agree that the record reflects my personal performance and is accurate and complete.            Scribe Attestation:   Scribe #1: I performed the above scribed service and the documentation accurately describes the services I performed. I attest to the accuracy of the note.        ED Course as of 08/14/22 1745   Sat Aug 06, 2022   1944 POCT Glucose, Hand-Held Device [DL]      ED Course User Index  [DL] Aristeo Tellez MD             Clinical Impression:   Final diagnoses:  [R20.2] Tingling of both feet (Primary)  [R73.9] Hyperglycemia  [I10] Elevated blood pressure reading with diagnosis of hypertension          ED Disposition Condition    Discharge Stable        ED Prescriptions     Medication Sig Dispense Start Date End Date Auth. Provider    gabapentin (NEURONTIN) 100 MG capsule Take 1 capsule (100 mg total) by mouth every evening. 30 capsule 8/6/2022 9/5/2022 Aristeo Tellez MD        Follow-up Information     Follow up With Specialties Details Why Contact Info Additional Information    [facility] [place]  Call  Monday morning, to schedule an appointment, for  re-evaluation of today's complaint, and ongoing care [address]  [place] [address]  [phone]       [facility] Call  Monday morning, to schedule an appointment, for re-evaluation of today's complaint, and ongoing care [address] [facility] [address] [phone] 1st Floor    The nearest emergency department.  Go to  As needed, If symptoms worsen             Aristeo Tellez MD  08/14/22 1731

## 2022-09-06 ENCOUNTER — TELEPHONE (OUTPATIENT)
Dept: UROLOGY | Facility: CLINIC | Age: 42
End: 2022-09-06
Payer: MEDICAID

## 2022-09-06 NOTE — TELEPHONE ENCOUNTER
----- Message from Colby Manzano sent at 9/6/2022  3:13 PM CDT -----  Regarding: Appt  Contact: Charli Hamilton  Calling to schedule appt for pt.    [phone]

## 2022-09-06 NOTE — TELEPHONE ENCOUNTER
Spoke with pt. Pt states Cialis is not working and wants to f/u with Chyna to talk about options. Appt scheduled. All questions answered. Pt verbalized understanding.

## 2022-09-26 ENCOUNTER — OFFICE VISIT (OUTPATIENT)
Dept: UROLOGY | Facility: CLINIC | Age: 42
End: 2022-09-26
Payer: MEDICAID

## 2022-09-26 VITALS
RESPIRATION RATE: 16 BRPM | HEART RATE: 114 BPM | WEIGHT: 271.25 LBS | DIASTOLIC BLOOD PRESSURE: 89 MMHG | BODY MASS INDEX: 40.18 KG/M2 | HEIGHT: 69 IN | OXYGEN SATURATION: 96 % | SYSTOLIC BLOOD PRESSURE: 134 MMHG

## 2022-09-26 DIAGNOSIS — N52.1 ERECTILE DYSFUNCTION DUE TO DISEASES CLASSIFIED ELSEWHERE: Primary | ICD-10-CM

## 2022-09-26 PROCEDURE — 3075F SYST BP GE 130 - 139MM HG: CPT | Mod: CPTII,,, | Performed by: NURSE PRACTITIONER

## 2022-09-26 PROCEDURE — 99999 PR PBB SHADOW E&M-EST. PATIENT-LVL IV: CPT | Mod: PBBFAC,,, | Performed by: NURSE PRACTITIONER

## 2022-09-26 PROCEDURE — 1159F PR MEDICATION LIST DOCUMENTED IN MEDICAL RECORD: ICD-10-PCS | Mod: CPTII,,, | Performed by: NURSE PRACTITIONER

## 2022-09-26 PROCEDURE — 3079F DIAST BP 80-89 MM HG: CPT | Mod: CPTII,,, | Performed by: NURSE PRACTITIONER

## 2022-09-26 PROCEDURE — 3008F BODY MASS INDEX DOCD: CPT | Mod: CPTII,,, | Performed by: NURSE PRACTITIONER

## 2022-09-26 PROCEDURE — 4010F ACE/ARB THERAPY RXD/TAKEN: CPT | Mod: CPTII,,, | Performed by: NURSE PRACTITIONER

## 2022-09-26 PROCEDURE — 3046F PR MOST RECENT HEMOGLOBIN A1C LEVEL > 9.0%: ICD-10-PCS | Mod: CPTII,,, | Performed by: NURSE PRACTITIONER

## 2022-09-26 PROCEDURE — 4010F PR ACE/ARB THEARPY RXD/TAKEN: ICD-10-PCS | Mod: CPTII,,, | Performed by: NURSE PRACTITIONER

## 2022-09-26 PROCEDURE — 99213 PR OFFICE/OUTPT VISIT, EST, LEVL III, 20-29 MIN: ICD-10-PCS | Mod: S$PBB,,, | Performed by: NURSE PRACTITIONER

## 2022-09-26 PROCEDURE — 3046F HEMOGLOBIN A1C LEVEL >9.0%: CPT | Mod: CPTII,,, | Performed by: NURSE PRACTITIONER

## 2022-09-26 PROCEDURE — 99214 OFFICE O/P EST MOD 30 MIN: CPT | Mod: PBBFAC,PN | Performed by: NURSE PRACTITIONER

## 2022-09-26 PROCEDURE — 1159F MED LIST DOCD IN RCRD: CPT | Mod: CPTII,,, | Performed by: NURSE PRACTITIONER

## 2022-09-26 PROCEDURE — 3075F PR MOST RECENT SYSTOLIC BLOOD PRESS GE 130-139MM HG: ICD-10-PCS | Mod: CPTII,,, | Performed by: NURSE PRACTITIONER

## 2022-09-26 PROCEDURE — 99999 PR PBB SHADOW E&M-EST. PATIENT-LVL IV: ICD-10-PCS | Mod: PBBFAC,,, | Performed by: NURSE PRACTITIONER

## 2022-09-26 PROCEDURE — 3008F PR BODY MASS INDEX (BMI) DOCUMENTED: ICD-10-PCS | Mod: CPTII,,, | Performed by: NURSE PRACTITIONER

## 2022-09-26 PROCEDURE — 99213 OFFICE O/P EST LOW 20 MIN: CPT | Mod: S$PBB,,, | Performed by: NURSE PRACTITIONER

## 2022-09-26 PROCEDURE — 3079F PR MOST RECENT DIASTOLIC BLOOD PRESSURE 80-89 MM HG: ICD-10-PCS | Mod: CPTII,,, | Performed by: NURSE PRACTITIONER

## 2022-09-26 RX ORDER — DAPAGLIFLOZIN AND METFORMIN HYDROCHLORIDE 2.5; 1 MG/1; MG/1
TABLET, FILM COATED, EXTENDED RELEASE ORAL
COMMUNITY
Start: 2022-09-25

## 2022-09-26 RX ORDER — SILDENAFIL 100 MG/1
100 TABLET, FILM COATED ORAL DAILY PRN
Qty: 30 TABLET | Refills: 11 | Status: SHIPPED | OUTPATIENT
Start: 2022-09-26

## 2022-09-26 RX ORDER — VALSARTAN 320 MG/1
320 TABLET ORAL DAILY
Status: ON HOLD | COMMUNITY
Start: 2022-08-11 | End: 2022-10-13 | Stop reason: HOSPADM

## 2022-09-26 RX ORDER — CLOTRIMAZOLE 1 %
CREAM (GRAM) TOPICAL 2 TIMES DAILY
COMMUNITY
Start: 2022-08-26

## 2022-09-26 NOTE — PROGRESS NOTES
"Subjective:      Chadd Alonzo is a 42 y.o. male who returns today regarding his ED.    Hx of DM and ED. Recent trial of Cialis 20 mg PRN did not produce meaningful results. Denies SE.  He expresses interested in trying Viagra.     The following portions of the patient's history were reviewed and updated as appropriate: allergies, current medications, past family history, past medical history, past social history, past surgical history and problem list.    Review of Systems  A comprehensive multipoint review of systems was negative except as otherwise stated in the HPI.     Objective:   Vitals: /89 (BP Location: Left arm, Patient Position: Sitting, BP Method: Large (Automatic))   Pulse (!) 114   Resp 16   Ht 5' 9" (1.753 m)   Wt 123.1 kg (271 lb 4.4 oz)   SpO2 96%   BMI 40.06 kg/m²     Physical Exam   General: alert and oriented, no acute distress  Respiratory: Symmetric expansion, non-labored breathing  Cardiovascular: regular rate and rhythm, no peripheral edema  Abdomen: soft, non distended  Skin: normal coloration and turgor, no rashes, no suspicious skin lesions noted  Neuro: no gross deficits  Psych: normal judgment and insight, normal mood/affect, and non-anxious    Lab Review   Urinalysis demonstrates no ua   Lab Results   Component Value Date    WBC 11.72 02/10/2022    HGB 13.2 (L) 02/10/2022    HCT 39.7 (L) 02/10/2022    HCT 51 07/11/2018    MCV 85 02/10/2022     (H) 02/10/2022     Lab Results   Component Value Date    CREATININE 0.7 02/13/2022    BUN 12 02/13/2022     No results found for: PSA, PSADIAG    Assessment and Plan:   1. Erectile dysfunction due to diseases classified elsewhere  --Discussed Viagra vs trimix; pt would like to try Viagra first. He will notify office in 2 weeks of Viagra effectiveness  - sildenafiL (VIAGRA) 100 MG tablet; Take 1 tablet (100 mg total) by mouth daily as needed for Erectile Dysfunction.  Dispense: 30 tablet; Refill: 11   --rtc prn     This note is " dictated on M*Modal word recognition program.  There are word recognition mistakes that are occasionally missed on review.

## 2022-10-11 ENCOUNTER — HOSPITAL ENCOUNTER (OUTPATIENT)
Facility: HOSPITAL | Age: 42
Discharge: HOME | End: 2022-10-13
Attending: EMERGENCY MEDICINE | Admitting: EMERGENCY MEDICINE
Payer: MEDICAID

## 2022-10-11 DIAGNOSIS — I10 ESSENTIAL HYPERTENSION: ICD-10-CM

## 2022-10-11 DIAGNOSIS — E11.10 DIABETIC KETOACIDOSIS WITHOUT COMA ASSOCIATED WITH TYPE 2 DIABETES MELLITUS: Primary | ICD-10-CM

## 2022-10-11 DIAGNOSIS — R07.9 CHEST PAIN: ICD-10-CM

## 2022-10-11 DIAGNOSIS — R06.02 SHORTNESS OF BREATH: ICD-10-CM

## 2022-10-11 DIAGNOSIS — K80.20 CHOLELITHIASIS: ICD-10-CM

## 2022-10-11 LAB
ALBUMIN SERPL BCP-MCNC: 4.4 G/DL (ref 3.5–5.2)
ALLENS TEST: ABNORMAL
ALP SERPL-CCNC: 55 U/L (ref 55–135)
ALT SERPL W/O P-5'-P-CCNC: 23 U/L (ref 10–44)
ANION GAP SERPL CALC-SCNC: 16 MMOL/L (ref 8–16)
AST SERPL-CCNC: 14 U/L (ref 10–40)
B-OH-BUTYR BLD STRIP-SCNC: 3.4 MMOL/L (ref 0–0.5)
BACTERIA #/AREA URNS HPF: ABNORMAL /HPF
BASOPHILS # BLD AUTO: 0.02 K/UL (ref 0–0.2)
BASOPHILS NFR BLD: 0.4 % (ref 0–1.9)
BILIRUB SERPL-MCNC: 0.6 MG/DL (ref 0.1–1)
BILIRUB UR QL STRIP: NEGATIVE
BUN SERPL-MCNC: 17 MG/DL (ref 6–20)
CALCIUM SERPL-MCNC: 9.3 MG/DL (ref 8.7–10.5)
CHLORIDE SERPL-SCNC: 107 MMOL/L (ref 95–110)
CLARITY UR: CLEAR
CO2 SERPL-SCNC: 19 MMOL/L (ref 23–29)
COLOR UR: YELLOW
CREAT SERPL-MCNC: 1.1 MG/DL (ref 0.5–1.4)
D DIMER PPP IA.FEU-MCNC: <0.19 MG/L FEU
DELSYS: ABNORMAL
DIFFERENTIAL METHOD: ABNORMAL
EOSINOPHIL # BLD AUTO: 0 K/UL (ref 0–0.5)
EOSINOPHIL NFR BLD: 0 % (ref 0–8)
ERYTHROCYTE [DISTWIDTH] IN BLOOD BY AUTOMATED COUNT: 13.1 % (ref 11.5–14.5)
EST. GFR  (NO RACE VARIABLE): >60 ML/MIN/1.73 M^2
ETHANOL SERPL-MCNC: <10 MG/DL
GLUCOSE SERPL-MCNC: 247 MG/DL (ref 70–110)
GLUCOSE UR QL STRIP: ABNORMAL
HCO3 UR-SCNC: 13.6 MMOL/L (ref 24–28)
HCT VFR BLD AUTO: 35.6 % (ref 40–54)
HGB BLD-MCNC: 12 G/DL (ref 14–18)
HGB UR QL STRIP: NEGATIVE
HYALINE CASTS #/AREA URNS LPF: 4 /LPF
IMM GRANULOCYTES # BLD AUTO: 0.01 K/UL (ref 0–0.04)
IMM GRANULOCYTES NFR BLD AUTO: 0.2 % (ref 0–0.5)
KETONES UR QL STRIP: ABNORMAL
LACTATE SERPL-SCNC: 1 MMOL/L (ref 0.5–2.2)
LACTATE SERPL-SCNC: 1.4 MMOL/L (ref 0.5–2.2)
LEUKOCYTE ESTERASE UR QL STRIP: NEGATIVE
LIPASE SERPL-CCNC: 13 U/L (ref 4–60)
LYMPHOCYTES # BLD AUTO: 0.9 K/UL (ref 1–4.8)
LYMPHOCYTES NFR BLD: 16.1 % (ref 18–48)
MCH RBC QN AUTO: 29.2 PG (ref 27–31)
MCHC RBC AUTO-ENTMCNC: 33.7 G/DL (ref 32–36)
MCV RBC AUTO: 87 FL (ref 82–98)
MICROSCOPIC COMMENT: ABNORMAL
MONOCYTES # BLD AUTO: 0.5 K/UL (ref 0.3–1)
MONOCYTES NFR BLD: 8.2 % (ref 4–15)
NEUTROPHILS # BLD AUTO: 4.1 K/UL (ref 1.8–7.7)
NEUTROPHILS NFR BLD: 75.1 % (ref 38–73)
NITRITE UR QL STRIP: NEGATIVE
NRBC BLD-RTO: 0 /100 WBC
PCO2 BLDA: 21.9 MMHG (ref 35–45)
PH SMN: 7.4 [PH] (ref 7.35–7.45)
PH UR STRIP: 6 [PH] (ref 5–8)
PLATELET # BLD AUTO: 219 K/UL (ref 150–450)
PMV BLD AUTO: 9.2 FL (ref 9.2–12.9)
PO2 BLDA: 36 MMHG (ref 40–60)
POC BE: -9 MMOL/L
POC SATURATED O2: 72 % (ref 95–100)
POC TCO2: 14 MMOL/L (ref 24–29)
POCT GLUCOSE: 173 MG/DL (ref 70–110)
POCT GLUCOSE: 236 MG/DL (ref 70–110)
POCT GLUCOSE: 251 MG/DL (ref 70–110)
POTASSIUM SERPL-SCNC: 4.2 MMOL/L (ref 3.5–5.1)
PROT SERPL-MCNC: 7.8 G/DL (ref 6–8.4)
PROT UR QL STRIP: ABNORMAL
RBC # BLD AUTO: 4.11 M/UL (ref 4.6–6.2)
RBC #/AREA URNS HPF: 2 /HPF (ref 0–4)
SAMPLE: ABNORMAL
SITE: ABNORMAL
SODIUM SERPL-SCNC: 142 MMOL/L (ref 136–145)
SP GR UR STRIP: >1.03 (ref 1–1.03)
SQUAMOUS #/AREA URNS HPF: 2 /HPF
TROPONIN I SERPL DL<=0.01 NG/ML-MCNC: <0.006 NG/ML (ref 0–0.03)
URN SPEC COLLECT METH UR: ABNORMAL
UROBILINOGEN UR STRIP-ACNC: NEGATIVE EU/DL
WBC # BLD AUTO: 5.47 K/UL (ref 3.9–12.7)
WBC #/AREA URNS HPF: 5 /HPF (ref 0–5)
YEAST URNS QL MICRO: ABNORMAL

## 2022-10-11 PROCEDURE — 85025 COMPLETE CBC W/AUTO DIFF WBC: CPT | Performed by: EMERGENCY MEDICINE

## 2022-10-11 PROCEDURE — 83605 ASSAY OF LACTIC ACID: CPT | Mod: 91 | Performed by: EMERGENCY MEDICINE

## 2022-10-11 PROCEDURE — 93005 ELECTROCARDIOGRAM TRACING: CPT

## 2022-10-11 PROCEDURE — 25500020 PHARM REV CODE 255: Performed by: EMERGENCY MEDICINE

## 2022-10-11 PROCEDURE — 82077 ASSAY SPEC XCP UR&BREATH IA: CPT | Performed by: EMERGENCY MEDICINE

## 2022-10-11 PROCEDURE — 96361 HYDRATE IV INFUSION ADD-ON: CPT

## 2022-10-11 PROCEDURE — 96374 THER/PROPH/DIAG INJ IV PUSH: CPT | Mod: 59

## 2022-10-11 PROCEDURE — 93010 ELECTROCARDIOGRAM REPORT: CPT | Mod: ,,, | Performed by: INTERNAL MEDICINE

## 2022-10-11 PROCEDURE — 99285 EMERGENCY DEPT VISIT HI MDM: CPT | Mod: 25

## 2022-10-11 PROCEDURE — 96376 TX/PRO/DX INJ SAME DRUG ADON: CPT

## 2022-10-11 PROCEDURE — 83690 ASSAY OF LIPASE: CPT | Performed by: EMERGENCY MEDICINE

## 2022-10-11 PROCEDURE — 96372 THER/PROPH/DIAG INJ SC/IM: CPT | Performed by: EMERGENCY MEDICINE

## 2022-10-11 PROCEDURE — 82010 KETONE BODYS QUAN: CPT | Performed by: EMERGENCY MEDICINE

## 2022-10-11 PROCEDURE — 83605 ASSAY OF LACTIC ACID: CPT | Performed by: EMERGENCY MEDICINE

## 2022-10-11 PROCEDURE — 25000003 PHARM REV CODE 250: Performed by: EMERGENCY MEDICINE

## 2022-10-11 PROCEDURE — 99900035 HC TECH TIME PER 15 MIN (STAT)

## 2022-10-11 PROCEDURE — 82803 BLOOD GASES ANY COMBINATION: CPT

## 2022-10-11 PROCEDURE — 85379 FIBRIN DEGRADATION QUANT: CPT | Performed by: EMERGENCY MEDICINE

## 2022-10-11 PROCEDURE — 63600175 PHARM REV CODE 636 W HCPCS: Performed by: EMERGENCY MEDICINE

## 2022-10-11 PROCEDURE — 80053 COMPREHEN METABOLIC PANEL: CPT | Performed by: EMERGENCY MEDICINE

## 2022-10-11 PROCEDURE — 82962 GLUCOSE BLOOD TEST: CPT

## 2022-10-11 PROCEDURE — 81000 URINALYSIS NONAUTO W/SCOPE: CPT | Performed by: EMERGENCY MEDICINE

## 2022-10-11 PROCEDURE — 96375 TX/PRO/DX INJ NEW DRUG ADDON: CPT

## 2022-10-11 PROCEDURE — 93010 EKG 12-LEAD: ICD-10-PCS | Mod: ,,, | Performed by: INTERNAL MEDICINE

## 2022-10-11 PROCEDURE — 84484 ASSAY OF TROPONIN QUANT: CPT | Performed by: EMERGENCY MEDICINE

## 2022-10-11 RX ORDER — MORPHINE SULFATE 4 MG/ML
4 INJECTION, SOLUTION INTRAMUSCULAR; INTRAVENOUS
Status: COMPLETED | OUTPATIENT
Start: 2022-10-11 | End: 2022-10-11

## 2022-10-11 RX ORDER — ONDANSETRON 2 MG/ML
4 INJECTION INTRAMUSCULAR; INTRAVENOUS
Status: COMPLETED | OUTPATIENT
Start: 2022-10-11 | End: 2022-10-11

## 2022-10-11 RX ADMIN — MORPHINE SULFATE 4 MG: 4 INJECTION INTRAVENOUS at 08:10

## 2022-10-11 RX ADMIN — IOHEXOL 80 ML: 350 INJECTION, SOLUTION INTRAVENOUS at 08:10

## 2022-10-11 RX ADMIN — INSULIN DETEMIR 15 UNITS: 100 INJECTION, SOLUTION SUBCUTANEOUS at 11:10

## 2022-10-11 RX ADMIN — SODIUM CHLORIDE 1000 ML: 0.9 INJECTION, SOLUTION INTRAVENOUS at 11:10

## 2022-10-11 RX ADMIN — MORPHINE SULFATE 4 MG: 4 INJECTION INTRAVENOUS at 11:10

## 2022-10-11 RX ADMIN — SODIUM CHLORIDE 1000 ML: 0.9 INJECTION, SOLUTION INTRAVENOUS at 07:10

## 2022-10-11 RX ADMIN — INSULIN HUMAN 7 UNITS: 100 INJECTION, SOLUTION PARENTERAL at 11:10

## 2022-10-11 RX ADMIN — ONDANSETRON 4 MG: 2 INJECTION INTRAMUSCULAR; INTRAVENOUS at 08:10

## 2022-10-11 RX ADMIN — ONDANSETRON 4 MG: 2 INJECTION INTRAMUSCULAR; INTRAVENOUS at 11:10

## 2022-10-11 NOTE — Clinical Note
Diagnosis: Shortness of breath [786.05.ICD-9-CM]   Future Attending Provider: SOFIYA PAYAN [[id number]]   Admitting Provider:: SAMIR JIMENEZ [[id number]]

## 2022-10-12 ENCOUNTER — ANESTHESIA EVENT (OUTPATIENT)
Dept: SURGERY | Facility: HOSPITAL | Age: 42
End: 2022-10-12
Payer: MEDICAID

## 2022-10-12 ENCOUNTER — ANESTHESIA (OUTPATIENT)
Dept: SURGERY | Facility: HOSPITAL | Age: 42
End: 2022-10-12
Payer: MEDICAID

## 2022-10-12 PROBLEM — E11.9 DIABETES: Status: ACTIVE | Noted: 2022-10-12

## 2022-10-12 PROBLEM — K80.20 CHOLELITHIASIS: Status: ACTIVE | Noted: 2022-10-12

## 2022-10-12 PROBLEM — R79.89 LOW TSH LEVEL: Status: ACTIVE | Noted: 2022-10-12

## 2022-10-12 PROBLEM — R00.0 TACHYCARDIA: Status: ACTIVE | Noted: 2022-10-12

## 2022-10-12 PROBLEM — R73.9 HYPERGLYCEMIA: Status: ACTIVE | Noted: 2022-10-12

## 2022-10-12 PROBLEM — K80.00 CALCULUS OF GALLBLADDER WITH ACUTE CHOLECYSTITIS WITHOUT OBSTRUCTION: Status: ACTIVE | Noted: 2022-10-12

## 2022-10-12 PROBLEM — R11.2 NAUSEA AND VOMITING: Status: ACTIVE | Noted: 2022-02-09

## 2022-10-12 LAB
ALBUMIN SERPL BCP-MCNC: 4.2 G/DL (ref 3.5–5.2)
ALLENS TEST: ABNORMAL
ALP SERPL-CCNC: 57 U/L (ref 55–135)
ALT SERPL W/O P-5'-P-CCNC: 18 U/L (ref 10–44)
AMPHET+METHAMPHET UR QL: NEGATIVE
ANION GAP SERPL CALC-SCNC: 11 MMOL/L (ref 8–16)
ANION GAP SERPL CALC-SCNC: 20 MMOL/L (ref 8–16)
AST SERPL-CCNC: 11 U/L (ref 10–40)
B-OH-BUTYR BLD STRIP-SCNC: 2.1 MMOL/L (ref 0–0.5)
BARBITURATES UR QL SCN>200 NG/ML: NEGATIVE
BASOPHILS # BLD AUTO: 0.01 K/UL (ref 0–0.2)
BASOPHILS NFR BLD: 0.1 % (ref 0–1.9)
BENZODIAZ UR QL SCN>200 NG/ML: NEGATIVE
BILIRUB SERPL-MCNC: 0.5 MG/DL (ref 0.1–1)
BUN SERPL-MCNC: 14 MG/DL (ref 6–20)
BUN SERPL-MCNC: 15 MG/DL (ref 6–30)
BZE UR QL SCN: NEGATIVE
CALCIUM SERPL-MCNC: 8.9 MG/DL (ref 8.7–10.5)
CANNABINOIDS UR QL SCN: NEGATIVE
CHLORIDE SERPL-SCNC: 105 MMOL/L (ref 95–110)
CHLORIDE SERPL-SCNC: 110 MMOL/L (ref 95–110)
CO2 SERPL-SCNC: 18 MMOL/L (ref 23–29)
CREAT SERPL-MCNC: 0.7 MG/DL (ref 0.5–1.4)
CREAT SERPL-MCNC: 0.9 MG/DL (ref 0.5–1.4)
CREAT UR-MCNC: 176.6 MG/DL (ref 23–375)
DELSYS: ABNORMAL
DIFFERENTIAL METHOD: ABNORMAL
EOSINOPHIL # BLD AUTO: 0 K/UL (ref 0–0.5)
EOSINOPHIL NFR BLD: 0 % (ref 0–8)
ERYTHROCYTE [DISTWIDTH] IN BLOOD BY AUTOMATED COUNT: 13.1 % (ref 11.5–14.5)
EST. GFR  (NO RACE VARIABLE): >60 ML/MIN/1.73 M^2
GLUCOSE SERPL-MCNC: 226 MG/DL (ref 70–110)
GLUCOSE SERPL-MCNC: 227 MG/DL (ref 70–110)
HCO3 UR-SCNC: 18.4 MMOL/L (ref 24–28)
HCT VFR BLD AUTO: 45.7 % (ref 40–54)
HCT VFR BLD CALC: 47 %PCV (ref 36–54)
HGB BLD-MCNC: 15.7 G/DL (ref 14–18)
IMM GRANULOCYTES # BLD AUTO: 0.02 K/UL (ref 0–0.04)
IMM GRANULOCYTES NFR BLD AUTO: 0.2 % (ref 0–0.5)
LYMPHOCYTES # BLD AUTO: 0.7 K/UL (ref 1–4.8)
LYMPHOCYTES NFR BLD: 8.1 % (ref 18–48)
MAGNESIUM SERPL-MCNC: 1.9 MG/DL (ref 1.6–2.6)
MCH RBC QN AUTO: 29.1 PG (ref 27–31)
MCHC RBC AUTO-ENTMCNC: 34.4 G/DL (ref 32–36)
MCV RBC AUTO: 85 FL (ref 82–98)
METHADONE UR QL SCN>300 NG/ML: NEGATIVE
MONOCYTES # BLD AUTO: 0.3 K/UL (ref 0.3–1)
MONOCYTES NFR BLD: 4 % (ref 4–15)
NEUTROPHILS # BLD AUTO: 7.2 K/UL (ref 1.8–7.7)
NEUTROPHILS NFR BLD: 87.6 % (ref 38–73)
NRBC BLD-RTO: 0 /100 WBC
OPIATES UR QL SCN: ABNORMAL
PCO2 BLDA: 30.6 MMHG (ref 35–45)
PCP UR QL SCN>25 NG/ML: NEGATIVE
PH SMN: 7.39 [PH] (ref 7.35–7.45)
PHOSPHATE SERPL-MCNC: 2.9 MG/DL (ref 2.7–4.5)
PLATELET # BLD AUTO: 280 K/UL (ref 150–450)
PMV BLD AUTO: 8.8 FL (ref 9.2–12.9)
PO2 BLDA: 61 MMHG (ref 40–60)
POC BE: -5 MMOL/L
POC IONIZED CALCIUM: 1.22 MMOL/L (ref 1.06–1.42)
POC SATURATED O2: 91 % (ref 95–100)
POC TCO2 (MEASURED): 21 MMOL/L (ref 23–29)
POC TCO2: 19 MMOL/L (ref 24–29)
POCT GLUCOSE: 155 MG/DL (ref 70–110)
POCT GLUCOSE: 187 MG/DL (ref 70–110)
POCT GLUCOSE: 200 MG/DL (ref 70–110)
POCT GLUCOSE: 211 MG/DL (ref 70–110)
POTASSIUM BLD-SCNC: 4.2 MMOL/L (ref 3.5–5.1)
POTASSIUM SERPL-SCNC: 4.3 MMOL/L (ref 3.5–5.1)
PROT SERPL-MCNC: 7.5 G/DL (ref 6–8.4)
RBC # BLD AUTO: 5.39 M/UL (ref 4.6–6.2)
SAMPLE: ABNORMAL
SAMPLE: ABNORMAL
SITE: ABNORMAL
SODIUM BLD-SCNC: 141 MMOL/L (ref 136–145)
SODIUM SERPL-SCNC: 139 MMOL/L (ref 136–145)
T4 FREE SERPL-MCNC: 1.29 NG/DL (ref 0.71–1.51)
T4 FREE SERPL-MCNC: NORMAL NG/DL (ref 0.71–1.51)
TOXICOLOGY INFORMATION: ABNORMAL
TSH SERPL DL<=0.005 MIU/L-ACNC: 0.44 UIU/ML (ref 0.4–4)
TSH SERPL DL<=0.005 MIU/L-ACNC: NORMAL UIU/ML (ref 0.4–4)
WBC # BLD AUTO: 8.26 K/UL (ref 3.9–12.7)

## 2022-10-12 PROCEDURE — 99214 OFFICE O/P EST MOD 30 MIN: CPT | Mod: ,,, | Performed by: NURSE PRACTITIONER

## 2022-10-12 PROCEDURE — 96375 TX/PRO/DX INJ NEW DRUG ADDON: CPT | Mod: 59

## 2022-10-12 PROCEDURE — 84100 ASSAY OF PHOSPHORUS: CPT | Performed by: EMERGENCY MEDICINE

## 2022-10-12 PROCEDURE — G0378 HOSPITAL OBSERVATION PER HR: HCPCS

## 2022-10-12 PROCEDURE — 25000003 PHARM REV CODE 250: Performed by: STUDENT IN AN ORGANIZED HEALTH CARE EDUCATION/TRAINING PROGRAM

## 2022-10-12 PROCEDURE — 83735 ASSAY OF MAGNESIUM: CPT | Performed by: EMERGENCY MEDICINE

## 2022-10-12 PROCEDURE — 80048 BASIC METABOLIC PNL TOTAL CA: CPT | Mod: XB | Performed by: EMERGENCY MEDICINE

## 2022-10-12 PROCEDURE — 63600175 PHARM REV CODE 636 W HCPCS: Performed by: SURGERY

## 2022-10-12 PROCEDURE — 71000033 HC RECOVERY, INTIAL HOUR: Performed by: SURGERY

## 2022-10-12 PROCEDURE — 88304 TISSUE EXAM BY PATHOLOGIST: CPT | Mod: 26,,, | Performed by: PATHOLOGY

## 2022-10-12 PROCEDURE — D9220A PRA ANESTHESIA: ICD-10-PCS | Mod: CRNA,,, | Performed by: NURSE ANESTHETIST, CERTIFIED REGISTERED

## 2022-10-12 PROCEDURE — D9220A PRA ANESTHESIA: Mod: CRNA,,, | Performed by: NURSE ANESTHETIST, CERTIFIED REGISTERED

## 2022-10-12 PROCEDURE — 99213 OFFICE O/P EST LOW 20 MIN: CPT | Mod: 57,,, | Performed by: SURGERY

## 2022-10-12 PROCEDURE — 96374 THER/PROPH/DIAG INJ IV PUSH: CPT | Mod: 59

## 2022-10-12 PROCEDURE — 27201423 OPTIME MED/SURG SUP & DEVICES STERILE SUPPLY: Performed by: SURGERY

## 2022-10-12 PROCEDURE — D9220A PRA ANESTHESIA: Mod: ANES,,, | Performed by: ANESTHESIOLOGY

## 2022-10-12 PROCEDURE — 88304 PR  SURG PATH,LEVEL III: ICD-10-PCS | Mod: 26,,, | Performed by: PATHOLOGY

## 2022-10-12 PROCEDURE — 85014 HEMATOCRIT: CPT

## 2022-10-12 PROCEDURE — 88304 TISSUE EXAM BY PATHOLOGIST: CPT | Performed by: PATHOLOGY

## 2022-10-12 PROCEDURE — 82962 GLUCOSE BLOOD TEST: CPT

## 2022-10-12 PROCEDURE — 99900035 HC TECH TIME PER 15 MIN (STAT)

## 2022-10-12 PROCEDURE — 63600175 PHARM REV CODE 636 W HCPCS: Performed by: NURSE PRACTITIONER

## 2022-10-12 PROCEDURE — 96361 HYDRATE IV INFUSION ADD-ON: CPT | Mod: 59

## 2022-10-12 PROCEDURE — 82010 KETONE BODYS QUAN: CPT | Performed by: EMERGENCY MEDICINE

## 2022-10-12 PROCEDURE — 63600175 PHARM REV CODE 636 W HCPCS: Performed by: NURSE ANESTHETIST, CERTIFIED REGISTERED

## 2022-10-12 PROCEDURE — 25000003 PHARM REV CODE 250: Performed by: NURSE PRACTITIONER

## 2022-10-12 PROCEDURE — 25000003 PHARM REV CODE 250: Performed by: ANESTHESIOLOGY

## 2022-10-12 PROCEDURE — 99213 PR OFFICE/OUTPT VISIT, EST, LEVL III, 20-29 MIN: ICD-10-PCS | Mod: 57,,, | Performed by: SURGERY

## 2022-10-12 PROCEDURE — 37000008 HC ANESTHESIA 1ST 15 MINUTES: Performed by: SURGERY

## 2022-10-12 PROCEDURE — 99214 PR OFFICE/OUTPT VISIT, EST, LEVL IV, 30-39 MIN: ICD-10-PCS | Mod: ,,, | Performed by: NURSE PRACTITIONER

## 2022-10-12 PROCEDURE — 25000003 PHARM REV CODE 250: Performed by: SURGERY

## 2022-10-12 PROCEDURE — C9113 INJ PANTOPRAZOLE SODIUM, VIA: HCPCS | Performed by: NURSE PRACTITIONER

## 2022-10-12 PROCEDURE — 00790 ANES IPER UPR ABD NOS: CPT | Performed by: SURGERY

## 2022-10-12 PROCEDURE — 85025 COMPLETE CBC W/AUTO DIFF WBC: CPT | Performed by: EMERGENCY MEDICINE

## 2022-10-12 PROCEDURE — D9220A PRA ANESTHESIA: ICD-10-PCS | Mod: ANES,,, | Performed by: ANESTHESIOLOGY

## 2022-10-12 PROCEDURE — 80053 COMPREHEN METABOLIC PANEL: CPT | Performed by: EMERGENCY MEDICINE

## 2022-10-12 PROCEDURE — 96372 THER/PROPH/DIAG INJ SC/IM: CPT | Performed by: NURSE PRACTITIONER

## 2022-10-12 PROCEDURE — 80047 BASIC METABLC PNL IONIZED CA: CPT

## 2022-10-12 PROCEDURE — 84439 ASSAY OF FREE THYROXINE: CPT | Performed by: EMERGENCY MEDICINE

## 2022-10-12 PROCEDURE — 47562 PR LAP,CHOLECYSTECTOMY: ICD-10-PCS | Mod: ,,, | Performed by: SURGERY

## 2022-10-12 PROCEDURE — 47562 LAPAROSCOPIC CHOLECYSTECTOMY: CPT | Mod: ,,, | Performed by: SURGERY

## 2022-10-12 PROCEDURE — 25000003 PHARM REV CODE 250: Performed by: NURSE ANESTHETIST, CERTIFIED REGISTERED

## 2022-10-12 PROCEDURE — 84443 ASSAY THYROID STIM HORMONE: CPT | Mod: 91 | Performed by: EMERGENCY MEDICINE

## 2022-10-12 PROCEDURE — 80307 DRUG TEST PRSMV CHEM ANLYZR: CPT | Performed by: EMERGENCY MEDICINE

## 2022-10-12 PROCEDURE — 36000708 HC OR TIME LEV III 1ST 15 MIN: Performed by: SURGERY

## 2022-10-12 PROCEDURE — 82565 ASSAY OF CREATININE: CPT

## 2022-10-12 PROCEDURE — 63600175 PHARM REV CODE 636 W HCPCS: Performed by: ANESTHESIOLOGY

## 2022-10-12 PROCEDURE — 36000709 HC OR TIME LEV III EA ADD 15 MIN: Performed by: SURGERY

## 2022-10-12 PROCEDURE — 96372 THER/PROPH/DIAG INJ SC/IM: CPT | Mod: 59 | Performed by: NURSE PRACTITIONER

## 2022-10-12 PROCEDURE — 82803 BLOOD GASES ANY COMBINATION: CPT

## 2022-10-12 PROCEDURE — 84295 ASSAY OF SERUM SODIUM: CPT

## 2022-10-12 PROCEDURE — 37000009 HC ANESTHESIA EA ADD 15 MINS: Performed by: SURGERY

## 2022-10-12 PROCEDURE — 84132 ASSAY OF SERUM POTASSIUM: CPT

## 2022-10-12 PROCEDURE — 82330 ASSAY OF CALCIUM: CPT

## 2022-10-12 RX ORDER — CEFAZOLIN SODIUM 2 G/50ML
2 SOLUTION INTRAVENOUS ONCE
Status: COMPLETED | OUTPATIENT
Start: 2022-10-12 | End: 2022-10-12

## 2022-10-12 RX ORDER — IBUPROFEN 200 MG
24 TABLET ORAL
Status: DISCONTINUED | OUTPATIENT
Start: 2022-10-12 | End: 2022-10-13 | Stop reason: HOSPADM

## 2022-10-12 RX ORDER — GLUCAGON 1 MG
1 KIT INJECTION
Status: DISCONTINUED | OUTPATIENT
Start: 2022-10-12 | End: 2022-10-13 | Stop reason: HOSPADM

## 2022-10-12 RX ORDER — AMLODIPINE AND VALSARTAN 5; 320 MG/1; MG/1
1 TABLET ORAL DAILY
COMMUNITY
Start: 2022-09-27

## 2022-10-12 RX ORDER — PANTOPRAZOLE SODIUM 40 MG/10ML
40 INJECTION, POWDER, LYOPHILIZED, FOR SOLUTION INTRAVENOUS 2 TIMES DAILY
Status: DISCONTINUED | OUTPATIENT
Start: 2022-10-12 | End: 2022-10-13 | Stop reason: HOSPADM

## 2022-10-12 RX ORDER — FENTANYL CITRATE 50 UG/ML
INJECTION, SOLUTION INTRAMUSCULAR; INTRAVENOUS
Status: DISCONTINUED | OUTPATIENT
Start: 2022-10-12 | End: 2022-10-12

## 2022-10-12 RX ORDER — HYDRALAZINE HYDROCHLORIDE 20 MG/ML
10 INJECTION INTRAMUSCULAR; INTRAVENOUS EVERY 6 HOURS PRN
Status: DISCONTINUED | OUTPATIENT
Start: 2022-10-12 | End: 2022-10-13 | Stop reason: HOSPADM

## 2022-10-12 RX ORDER — INDOCYANINE GREEN AND WATER 25 MG
KIT INJECTION
Status: DISCONTINUED | OUTPATIENT
Start: 2022-10-12 | End: 2022-10-12

## 2022-10-12 RX ORDER — LABETALOL HYDROCHLORIDE 5 MG/ML
20 INJECTION, SOLUTION INTRAVENOUS ONCE
Status: COMPLETED | OUTPATIENT
Start: 2022-10-12 | End: 2022-10-12

## 2022-10-12 RX ORDER — ATORVASTATIN CALCIUM 40 MG/1
40 TABLET, FILM COATED ORAL NIGHTLY
Status: DISCONTINUED | OUTPATIENT
Start: 2022-10-12 | End: 2022-10-13 | Stop reason: HOSPADM

## 2022-10-12 RX ORDER — PANTOPRAZOLE SODIUM 40 MG/10ML
40 INJECTION, POWDER, LYOPHILIZED, FOR SOLUTION INTRAVENOUS DAILY
Status: DISCONTINUED | OUTPATIENT
Start: 2022-10-12 | End: 2022-10-12

## 2022-10-12 RX ORDER — ENOXAPARIN SODIUM 100 MG/ML
40 INJECTION SUBCUTANEOUS EVERY 24 HOURS
Status: DISCONTINUED | OUTPATIENT
Start: 2022-10-12 | End: 2022-10-13 | Stop reason: HOSPADM

## 2022-10-12 RX ORDER — INSULIN ASPART 100 [IU]/ML
0-5 INJECTION, SOLUTION INTRAVENOUS; SUBCUTANEOUS EVERY 6 HOURS PRN
Status: DISCONTINUED | OUTPATIENT
Start: 2022-10-12 | End: 2022-10-13 | Stop reason: HOSPADM

## 2022-10-12 RX ORDER — PROPOFOL 10 MG/ML
VIAL (ML) INTRAVENOUS
Status: DISCONTINUED | OUTPATIENT
Start: 2022-10-12 | End: 2022-10-12

## 2022-10-12 RX ORDER — BUPIVACAINE HYDROCHLORIDE AND EPINEPHRINE 2.5; 5 MG/ML; UG/ML
INJECTION, SOLUTION EPIDURAL; INFILTRATION; INTRACAUDAL; PERINEURAL
Status: DISCONTINUED | OUTPATIENT
Start: 2022-10-12 | End: 2022-10-12 | Stop reason: HOSPADM

## 2022-10-12 RX ORDER — HYDRALAZINE HYDROCHLORIDE 25 MG/1
50 TABLET, FILM COATED ORAL EVERY 8 HOURS
Status: DISCONTINUED | OUTPATIENT
Start: 2022-10-12 | End: 2022-10-13 | Stop reason: HOSPADM

## 2022-10-12 RX ORDER — LOSARTAN POTASSIUM 25 MG/1
100 TABLET ORAL DAILY
Status: DISCONTINUED | OUTPATIENT
Start: 2022-10-12 | End: 2022-10-13 | Stop reason: HOSPADM

## 2022-10-12 RX ORDER — HYDROMORPHONE HYDROCHLORIDE 2 MG/ML
0.2 INJECTION, SOLUTION INTRAMUSCULAR; INTRAVENOUS; SUBCUTANEOUS EVERY 5 MIN PRN
Status: DISCONTINUED | OUTPATIENT
Start: 2022-10-12 | End: 2022-10-13

## 2022-10-12 RX ORDER — SODIUM CHLORIDE 0.9 % (FLUSH) 0.9 %
10 SYRINGE (ML) INJECTION EVERY 12 HOURS PRN
Status: DISCONTINUED | OUTPATIENT
Start: 2022-10-12 | End: 2022-10-13 | Stop reason: HOSPADM

## 2022-10-12 RX ORDER — LABETALOL HYDROCHLORIDE 5 MG/ML
INJECTION, SOLUTION INTRAVENOUS
Status: DISPENSED
Start: 2022-10-12 | End: 2022-10-13

## 2022-10-12 RX ORDER — SODIUM CHLORIDE 9 MG/ML
INJECTION, SOLUTION INTRAVENOUS CONTINUOUS
Status: DISCONTINUED | OUTPATIENT
Start: 2022-10-12 | End: 2022-10-12

## 2022-10-12 RX ORDER — FENTANYL CITRATE 50 UG/ML
25 INJECTION, SOLUTION INTRAMUSCULAR; INTRAVENOUS EVERY 5 MIN PRN
Status: DISCONTINUED | OUTPATIENT
Start: 2022-10-12 | End: 2022-10-13 | Stop reason: HOSPADM

## 2022-10-12 RX ORDER — ONDANSETRON 2 MG/ML
4 INJECTION INTRAMUSCULAR; INTRAVENOUS EVERY 6 HOURS PRN
Status: DISCONTINUED | OUTPATIENT
Start: 2022-10-12 | End: 2022-10-13 | Stop reason: HOSPADM

## 2022-10-12 RX ORDER — SODIUM CHLORIDE 0.9 % (FLUSH) 0.9 %
3 SYRINGE (ML) INJECTION
Status: DISCONTINUED | OUTPATIENT
Start: 2022-10-12 | End: 2022-10-13 | Stop reason: HOSPADM

## 2022-10-12 RX ORDER — MIDAZOLAM HYDROCHLORIDE 1 MG/ML
INJECTION, SOLUTION INTRAMUSCULAR; INTRAVENOUS
Status: DISCONTINUED | OUTPATIENT
Start: 2022-10-12 | End: 2022-10-12

## 2022-10-12 RX ORDER — HYDROMORPHONE HYDROCHLORIDE 1 MG/ML
0.5 INJECTION, SOLUTION INTRAMUSCULAR; INTRAVENOUS; SUBCUTANEOUS
Status: DISCONTINUED | OUTPATIENT
Start: 2022-10-12 | End: 2022-10-13

## 2022-10-12 RX ORDER — POLYETHYLENE GLYCOL 3350 17 G/17G
17 POWDER, FOR SOLUTION ORAL 2 TIMES DAILY PRN
Status: DISCONTINUED | OUTPATIENT
Start: 2022-10-12 | End: 2022-10-13 | Stop reason: HOSPADM

## 2022-10-12 RX ORDER — ROCURONIUM BROMIDE 10 MG/ML
INJECTION, SOLUTION INTRAVENOUS
Status: DISCONTINUED | OUTPATIENT
Start: 2022-10-12 | End: 2022-10-12

## 2022-10-12 RX ORDER — NALOXONE HCL 0.4 MG/ML
0.02 VIAL (ML) INJECTION
Status: DISCONTINUED | OUTPATIENT
Start: 2022-10-12 | End: 2022-10-13 | Stop reason: HOSPADM

## 2022-10-12 RX ORDER — LIDOCAINE HYDROCHLORIDE 20 MG/ML
INJECTION INTRAVENOUS
Status: DISCONTINUED | OUTPATIENT
Start: 2022-10-12 | End: 2022-10-12

## 2022-10-12 RX ORDER — IBUPROFEN 200 MG
16 TABLET ORAL
Status: DISCONTINUED | OUTPATIENT
Start: 2022-10-12 | End: 2022-10-13 | Stop reason: HOSPADM

## 2022-10-12 RX ORDER — POLYETHYLENE GLYCOL 3350 17 G/17G
17 POWDER, FOR SOLUTION ORAL DAILY
Status: DISCONTINUED | OUTPATIENT
Start: 2022-10-12 | End: 2022-10-13 | Stop reason: HOSPADM

## 2022-10-12 RX ADMIN — HYDROMORPHONE HYDROCHLORIDE 0.2 MG: 2 INJECTION INTRAMUSCULAR; INTRAVENOUS; SUBCUTANEOUS at 04:10

## 2022-10-12 RX ADMIN — SODIUM CHLORIDE, SODIUM LACTATE, POTASSIUM CHLORIDE, AND CALCIUM CHLORIDE: .6; .31; .03; .02 INJECTION, SOLUTION INTRAVENOUS at 03:10

## 2022-10-12 RX ADMIN — CEFAZOLIN SODIUM 2 G: 2 SOLUTION INTRAVENOUS at 02:10

## 2022-10-12 RX ADMIN — LOSARTAN POTASSIUM 100 MG: 25 TABLET, FILM COATED ORAL at 08:10

## 2022-10-12 RX ADMIN — SODIUM CHLORIDE: 0.9 INJECTION, SOLUTION INTRAVENOUS at 04:10

## 2022-10-12 RX ADMIN — SODIUM CHLORIDE, SODIUM LACTATE, POTASSIUM CHLORIDE, AND CALCIUM CHLORIDE: .6; .31; .03; .02 INJECTION, SOLUTION INTRAVENOUS at 01:10

## 2022-10-12 RX ADMIN — PROPOFOL 200 MG: 10 INJECTION, EMULSION INTRAVENOUS at 02:10

## 2022-10-12 RX ADMIN — LABETALOL HYDROCHLORIDE 20 MG: 5 INJECTION INTRAVENOUS at 04:10

## 2022-10-12 RX ADMIN — ONDANSETRON 4 MG: 2 INJECTION INTRAMUSCULAR; INTRAVENOUS at 09:10

## 2022-10-12 RX ADMIN — ENOXAPARIN SODIUM 40 MG: 100 INJECTION SUBCUTANEOUS at 05:10

## 2022-10-12 RX ADMIN — HYDROMORPHONE HYDROCHLORIDE 0.5 MG: 1 INJECTION, SOLUTION INTRAMUSCULAR; INTRAVENOUS; SUBCUTANEOUS at 09:10

## 2022-10-12 RX ADMIN — FENTANYL CITRATE 100 MCG: 50 INJECTION, SOLUTION INTRAMUSCULAR; INTRAVENOUS at 02:10

## 2022-10-12 RX ADMIN — INDOCYANINE GREEN AND WATER 5 MG: KIT at 01:10

## 2022-10-12 RX ADMIN — MIDAZOLAM HYDROCHLORIDE 2 MG: 1 INJECTION, SOLUTION INTRAMUSCULAR; INTRAVENOUS at 01:10

## 2022-10-12 RX ADMIN — HYDROMORPHONE HYDROCHLORIDE 0.5 MG: 1 INJECTION, SOLUTION INTRAMUSCULAR; INTRAVENOUS; SUBCUTANEOUS at 04:10

## 2022-10-12 RX ADMIN — HYDRALAZINE HYDROCHLORIDE 50 MG: 25 TABLET, FILM COATED ORAL at 05:10

## 2022-10-12 RX ADMIN — LIDOCAINE HYDROCHLORIDE 100 MG: 20 INJECTION, SOLUTION INTRAVENOUS at 02:10

## 2022-10-12 RX ADMIN — ATORVASTATIN CALCIUM 40 MG: 40 TABLET, FILM COATED ORAL at 08:10

## 2022-10-12 RX ADMIN — INSULIN DETEMIR 25 UNITS: 100 INJECTION, SOLUTION SUBCUTANEOUS at 04:10

## 2022-10-12 RX ADMIN — HYDROMORPHONE HYDROCHLORIDE 0.5 MG: 1 INJECTION, SOLUTION INTRAMUSCULAR; INTRAVENOUS; SUBCUTANEOUS at 05:10

## 2022-10-12 RX ADMIN — ROCURONIUM BROMIDE 50 MG: 10 INJECTION, SOLUTION INTRAVENOUS at 02:10

## 2022-10-12 RX ADMIN — SUGAMMADEX 400 MG: 100 INJECTION, SOLUTION INTRAVENOUS at 03:10

## 2022-10-12 RX ADMIN — PANTOPRAZOLE SODIUM 40 MG: 40 INJECTION, POWDER, FOR SOLUTION INTRAVENOUS at 09:10

## 2022-10-12 RX ADMIN — INSULIN DETEMIR 25 UNITS: 100 INJECTION, SOLUTION SUBCUTANEOUS at 08:10

## 2022-10-12 RX ADMIN — FENTANYL CITRATE 50 MCG: 50 INJECTION, SOLUTION INTRAMUSCULAR; INTRAVENOUS at 03:10

## 2022-10-12 RX ADMIN — FENTANYL CITRATE 100 MCG: 50 INJECTION, SOLUTION INTRAMUSCULAR; INTRAVENOUS at 01:10

## 2022-10-12 RX ADMIN — PANTOPRAZOLE SODIUM 40 MG: 40 INJECTION, POWDER, FOR SOLUTION INTRAVENOUS at 08:10

## 2022-10-12 NOTE — ED TRIAGE NOTES
Pt presents to ED c/o epigastric pain x2 weeks, nausea, vomiting (x3 episodes today).  Pt reports still passing gas and burping pretty frequently. Pt is alert, calm, and orineted x4, plaed on cardiac monitoring and continous pulse ox, 100% on RA.

## 2022-10-12 NOTE — ASSESSMENT & PLAN NOTE
Patient's FSGs are uncontrolled due to hyperglycemia on current medication regimen.  Last A1c reviewed-   Lab Results   Component Value Date    HGBA1C 11.2 (H) 02/09/2022     Most recent fingerstick glucose reviewed-   Recent Labs   Lab 10/11/22  2025 10/11/22  2215 10/11/22  2309 10/12/22  0012   POCTGLUCOSE 173* 251* 236* 155*     Current correctional scale  Low  Maintain anti-hyperglycemic dose as follows-   Antihyperglycemics (From admission, onward)    Start     Stop Route Frequency Ordered    10/12/22 0430  insulin detemir U-100 pen 25 Units         -- SubQ Nightly 10/12/22 0324    10/12/22 0423  insulin aspart U-100 pen 0-5 Units         -- SubQ Every 6 hours PRN 10/12/22 0323        Hold Oral hypoglycemics while patient is in the hospital.

## 2022-10-12 NOTE — CONSULTS
GENERAL SURGERY  Consultation      REASON FOR CONSULT:  Cholelithiasis      SUBJECTIVE:     HISTORY OF PRESENT ILLNESS:  Chadd Alonzo is a 42 y.o. male with PMH DM2, HTN who was initially admitted to [facility] on 10/11/2022 for symptomatic cholelithiasis. He reports a 4 week history of post-prandial epigastric abdominal pain, worse after spicy or fatty foods, associated with nausea and constipation. No recent fever or chills. No prior abdominal surgery. In ED, WBC normal and no transaminitis. Normal Tbili. CT A/P with cholelithiasis without cholecystitis. No ductal dilation. RUQ U/S however did not visualize cholelithiasis, or wall thickening/per-cholecystic fluid concerning for cholecystitis.     General Surgery has been consulted for cholelithiasis.        MEDICATIONS:  Home Medications:  No current facility-administered medications on file prior to encounter.     Current Outpatient Medications on File Prior to Encounter   Medication Sig Dispense Refill    acetaminophen (TYLENOL) 500 MG tablet Take 1 tablet (500 mg total) by mouth every 6 (six) hours as needed for Pain (As needed for pain and fever). 30 tablet 0    amlodipine-valsartan (EXFORGE) 5-320 mg per tablet Take 1 tablet by mouth once daily.      blood sugar diagnostic Strp 1 strip by Misc.(Non-Drug; Combo Route) route 2 (two) times daily with meals. 100 strip 11    blood-glucose meter kit Use as instructed 1 each 0    clotrimazole (LOTRIMIN) 1 % cream Apply topically 2 (two) times daily.      gabapentin (NEURONTIN) 100 MG capsule Take 1 capsule (100 mg total) by mouth every evening. 30 capsule 0    ibuprofen (ADVIL,MOTRIN) 600 MG tablet Take 1 tablet (600 mg total) by mouth every 6 (six) hours as needed for Pain (Take with food as needed for mild-to-moderate pain). 20 tablet 0    insulin aspart U-100 (NOVOLOG) 100 unit/mL (3 mL) InPn pen Inject 8 Units into the skin 3 (three) times daily. 21.6 mL 3    insulin detemir U-100 (LEVEMIR FLEXTOUCH)  "100 unit/mL (3 mL) SubQ InPn pen Inject 25 Units into the skin once daily. 22.5 mL 3    insulin syringe-needle U-100 1 mL 31 gauge x 15/64" Syrg 1 Syringe by Misc.(Non-Drug; Combo Route) route 3 (three) times daily with meals. 120 each 3    lancets Misc 1 lancet by Misc.(Non-Drug; Combo Route) route 2 (two) times daily with meals. 100 each 11    lancing device Misc 1 Device by Misc.(Non-Drug; Combo Route) route 2 (two) times daily with meals. 1 each 0    LIDOcaine HCl 2% (XYLOCAINE) 2 % Soln by Mucous Membrane route every 3 (three) hours as needed (As needed for sore throat). Apply 1 tsp to painful area every 3 hr as needed for pain 60 mL 0    losartan (COZAAR) 100 MG tablet Take 1 tablet (100 mg total) by mouth once daily. 90 tablet 3    pantoprazole (PROTONIX) 40 MG tablet Take 1 tablet (40 mg total) by mouth once daily. 30 tablet 11    pen needle, diabetic 31 gauge x 5/16" Ndle 1 each by Misc.(Non-Drug; Combo Route) route 2 (two) times daily with meals. 100 each 11    rosuvastatin (CRESTOR) 10 MG tablet Take 10 mg by mouth once daily.      sildenafiL (VIAGRA) 100 MG tablet Take 1 tablet (100 mg total) by mouth daily as needed for Erectile Dysfunction. 30 tablet 11    tadalafiL (CIALIS) 20 MG Tab Take 1 tablet (20 mg total) by mouth Every 3 (three) days. for 10 days 12 tablet 11    TRULICITY 0.75 mg/0.5 mL pen injector Inject 0.75 mg into the skin every 7 days.      valsartan (DIOVAN) 320 MG tablet Take 320 mg by mouth once daily.      XIGDUO XR 2.5-1,000 mg TBph Take by mouth.       Inpatient Medications:   atorvastatin  40 mg Oral QHS    enoxaparin  40 mg Subcutaneous Daily    insulin detemir U-100  25 Units Subcutaneous QHS    losartan  100 mg Oral Daily    pantoprazole  40 mg Intravenous Daily     Infusions:   sodium chloride 0.9% 75 mL/hr at 10/12/22 0426     PRN Medications:dextrose 10%, dextrose 10%, dextrose 10%, glucagon (human recombinant), glucagon (human recombinant), glucose, glucose, hydrALAZINE, " HYDROmorphone, insulin aspart U-100, naloxone, ondansetron, sodium chloride 0.9%    ALLERGIES:    Review of patient's allergies indicates:  No Known Allergies    PAST MEDICAL HISTORY:    Past Medical History:   Diagnosis Date    Diabetes mellitus     Hypertension        SURGICAL HISTORY:  No past surgical history on file.    FAMILY HISTORY:  No family history on file.    SOCIAL HISTORY:  Social History     Tobacco Use    Smoking status: Never    Smokeless tobacco: Never   Substance Use Topics    Alcohol use: No    Drug use: No        REVIEW OF SYSTEMS:  A 10-point review of systems is negative except for the above mentioned in the HPI.    OBJECTIVE:     Most Recent Vitals:  Temp: 98.5 °F (36.9 °C) (10/12/22 0457)  Pulse: (!) 118 (10/12/22 0457)  Resp: 19 (10/12/22 0457)  BP: (!) 156/73 (10/12/22 0457)  SpO2: 98 % (10/12/22 0457)    24-Hour Vitals:  Temp:  [97.9 °F (36.6 °C)-98.5 °F (36.9 °C)]   Pulse:  [111-129]   Resp:  [15-24]   BP: (153-176)/()   SpO2:  [97 %-100 %]     24-Hour I&O:  Intake/Output Summary (Last 24 hours) at 10/12/2022 0648  Last data filed at 10/12/2022 0255  Gross per 24 hour   Intake 1999 ml   Output --   Net 1999 ml       PHYSICAL EXAM:  AAO, NAD, well developed and well nourished.  Head normocephalic, atraumatic.  Trachea midline, neck supple.  Respirations unlabored with good inspiratory effort.  Heart regular rate and rhythm.  Abdomen soft, obese, nondistended, nontender to palpation.    Negative horton sign       LABORATORY VALUES:  Recent Labs   Lab 10/11/22  2022 10/12/22  0155 10/12/22  0345   WBC 5.47  --  8.26   HGB 12.0*  --  15.7   HCT 35.6* 47 45.7     --  280     Recent Labs   Lab 10/11/22  1941 10/12/22  0013 10/12/22  0105 10/12/22  0345    147* 147* 139   K 4.2 2.0* 2.1* 4.3    129* 130* 110   CO2 19* 11* 11* 18*   BUN 17 10 9 14   CREATININE 1.1 0.5 0.4* 0.9   * 143* 129* 227*   CALCIUM 9.3 4.4* 3.9* 8.9   MG  --   --  0.8* 1.9   PHOS  --   --   1.4* 2.9   AST 14  --   --  11   ALT 23  --   --  18   ALKPHOS 55  --   --  57   BILITOT 0.6  --   --  0.5   PROT 7.8  --   --  7.5   ALBUMIN 4.4  --   --  4.2   LIPASE 13  --   --   --      Recent Labs   Lab 10/11/22  1941 10/11/22  2022 10/11/22  2311   LACTATE  --  1.0 1.4   TROPONINI <0.006  --   --      Recent Labs   Lab 10/11/22  2010 10/12/22  0349   PH 7.401 7.387   PCO2 21.9* 30.6*   PO2 36* 61*   HCO3 13.6* 18.4*       DIAGNOSTIC STUDIES:  US: Reviewed  CT: Reviewed    ASSESSMENT:     Chadd Alonzo is a 42 y.o. male admitted to [facility] on 10/11/2022 for symptomatic cholelithiasis. RUQ ultrasound non-diagnostic, CT with evidence of gallstones without cholecystitis or choledocholithiasis. Discussed with patient, will schedule today for a laparoscopic cholecystectomy.       PLAN:  OR today for laparoscopic cholecystectomy  NPO  Will obtain informed consent   Rest of care per primary team       Thank you, and please call back if/when General Surgery can be of any further assistance in the future care of this patient. Will continue to follow while inpatient.       Ciarra Mathis MD

## 2022-10-12 NOTE — PLAN OF CARE
[facility]  Discharge Assessment    Primary Care Provider: [facility]     Discharge Assessment (most recent)       BRIEF DISCHARGE ASSESSMENT - 10/12/22 7285          Discharge Planning    Assessment Type Discharge Planning Brief Assessment     Resource/Environmental Concerns none     Support Systems Family members     Current Living Arrangements home/apartment/condo     Patient/Family Anticipates Transition to home     Patient/Family Anticipated Services at Transition none     DME Needed Upon Discharge  none     Discharge Plan A Home     Discharge Plan B Home                    Follow-up Information       [facility] Follow up.    Contact information:  [address] [place] [address]  [phone]               Anjel Akhtar MD Follow up.    Specialties: General Surgery, Surgery  Contact information:  [address]  [address]  [place] [id number] [phone]

## 2022-10-12 NOTE — ASSESSMENT & PLAN NOTE
Consult GI and general surgery  NPO  IVF  Pain control  CT abdomen/pelvis: The gallbladder contains calcified gallstones  US abdomen: Limited examination.  Mild gallbladder sludge.  No cholelithiasis or evidence of acute cholecystitis.

## 2022-10-12 NOTE — ANESTHESIA PREPROCEDURE EVALUATION
10/12/2022  Chadd Alonzo is a 42 y.o., male.      Pre-op Assessment     I have reviewed the Nursing Notes.       Review of Systems  Social:  Non-Smoker    Cardiovascular:   Hypertension    Pulmonary:  Pulmonary Normal    Renal/:  Renal/ Normal     Hepatic/GI:   No Bowel Prep. Denies PUD. Denies Hepatitis.    Neurological:  Neurology Normal    Endocrine:   Diabetes, type 2  Obesity / BMI > 30      Physical Exam  General: Well nourished, Cooperative, Alert and Oriented    Airway:  Mallampati: III   Mouth Opening: Normal  TM Distance: Normal  Tongue: Normal  Neck ROM: Normal ROM    Dental:  Intact        Anesthesia Plan  Type of Anesthesia, risks & benefits discussed:    Anesthesia Type: Gen ETT  Intra-op Monitoring Plan: Standard ASA Monitors  Post Op Pain Control Plan: multimodal analgesia  Induction:  IV  Informed Consent: Informed consent signed with the Patient and all parties understand the risks and agree with anesthesia plan.  All questions answered.   ASA Score: 2  Day of Surgery Review of History & Physical: H&P Update referred to the surgeon/provider.    Ready For Surgery From Anesthesia Perspective.     .

## 2022-10-12 NOTE — HPI
42 year old male present to ED with c/o with abdominal pain, belching, nausea, vomiting x2 days.  Patient reports history of similar.  Denies fevers, chills.  Denies diarrhea.  Notes mild gas passage.  Denies hemoptysis, hematemesis. PMHx of DM  and HTN.

## 2022-10-12 NOTE — ASSESSMENT & PLAN NOTE
Unknown reason patient tachycardia, no fever or elevated WBCs...  IVF given in ED  Tele monitoring  EKG as needed

## 2022-10-12 NOTE — CONSULTS
[facility]    Patient Complaint: epigastric pain    PCP:   [facility] [place]       LOS: 0        Initial History of Present Illness (HPI):  This is a 42 y.o. male consulted to GI service for cholecystitis. Patient complains of acute onset severe epigastric pain with associated nausea and vomiting that began on yesterday. Reports the pain has been intermittent for months but worsened on yesterday. Patient also describes a feelings of increased pain immediately after eating with regurgitation of food when eating fried or greasy items. Reports taking ppi in the past with no relief.  CT and US show gallbladder abnormality, however unable to confirm via imaging wether sludge or stones. Surgery following and ordered HIDA scan.  Patient reports pain as resolved this am. Labs stable.      Review of Systems   Constitutional:  Positive for appetite change and fatigue. Negative for activity change, chills, diaphoresis and fever.   HENT:  Negative for congestion, drooling, rhinorrhea, sore throat and trouble swallowing.    Eyes:  Negative for discharge.   Respiratory:  Negative for cough, shortness of breath and wheezing.    Cardiovascular:  Negative for chest pain, palpitations and leg swelling.   Gastrointestinal:  Positive for abdominal pain, nausea and vomiting. Negative for abdominal distention, anal bleeding, blood in stool, constipation, diarrhea and rectal pain.   Endocrine: Negative for cold intolerance and heat intolerance.   Genitourinary:  Negative for frequency, hematuria and urgency.   Musculoskeletal:  Negative for arthralgias.   Skin:  Negative for color change, pallor and rash.   Neurological:  Negative for syncope, facial asymmetry, weakness and numbness.   Psychiatric/Behavioral:  Negative for agitation and confusion. The patient is not nervous/anxious.          Medical History:  has a past medical history of Diabetes mellitus and Hypertension.    Surgical  "History: Denies any pertinent surgical history.    Family History: No pertinent family history.     Social History:  reports that he has never smoked. He has never used smokeless tobacco. He reports that he does not drink alcohol and does not use drugs.    Review of patient's allergies indicates:  No Known Allergies    No current facility-administered medications on file prior to encounter.     Current Outpatient Medications on File Prior to Encounter   Medication Sig Dispense Refill    acetaminophen (TYLENOL) 500 MG tablet Take 1 tablet (500 mg total) by mouth every 6 (six) hours as needed for Pain (As needed for pain and fever). 30 tablet 0    amlodipine-valsartan (EXFORGE) 5-320 mg per tablet Take 1 tablet by mouth once daily.      blood sugar diagnostic Strp 1 strip by Misc.(Non-Drug; Combo Route) route 2 (two) times daily with meals. 100 strip 11    blood-glucose meter kit Use as instructed 1 each 0    clotrimazole (LOTRIMIN) 1 % cream Apply topically 2 (two) times daily.      gabapentin (NEURONTIN) 100 MG capsule Take 1 capsule (100 mg total) by mouth every evening. 30 capsule 0    ibuprofen (ADVIL,MOTRIN) 600 MG tablet Take 1 tablet (600 mg total) by mouth every 6 (six) hours as needed for Pain (Take with food as needed for mild-to-moderate pain). 20 tablet 0    insulin aspart U-100 (NOVOLOG) 100 unit/mL (3 mL) InPn pen Inject 8 Units into the skin 3 (three) times daily. 21.6 mL 3    insulin detemir U-100 (LEVEMIR FLEXTOUCH) 100 unit/mL (3 mL) SubQ InPn pen Inject 25 Units into the skin once daily. 22.5 mL 3    insulin syringe-needle U-100 1 mL 31 gauge x 15/64" Syrg 1 Syringe by Misc.(Non-Drug; Combo Route) route 3 (three) times daily with meals. 120 each 3    lancets Misc 1 lancet by Misc.(Non-Drug; Combo Route) route 2 (two) times daily with meals. 100 each 11    lancing device Misc 1 Device by Misc.(Non-Drug; Combo Route) route 2 (two) times daily with meals. 1 each 0    LIDOcaine HCl 2% (XYLOCAINE) 2 % " "Soln by Mucous Membrane route every 3 (three) hours as needed (As needed for sore throat). Apply 1 tsp to painful area every 3 hr as needed for pain 60 mL 0    losartan (COZAAR) 100 MG tablet Take 1 tablet (100 mg total) by mouth once daily. 90 tablet 3    pantoprazole (PROTONIX) 40 MG tablet Take 1 tablet (40 mg total) by mouth once daily. 30 tablet 11    pen needle, diabetic 31 gauge x 5/16" Ndle 1 each by Misc.(Non-Drug; Combo Route) route 2 (two) times daily with meals. 100 each 11    rosuvastatin (CRESTOR) 10 MG tablet Take 10 mg by mouth once daily.      sildenafiL (VIAGRA) 100 MG tablet Take 1 tablet (100 mg total) by mouth daily as needed for Erectile Dysfunction. 30 tablet 11    tadalafiL (CIALIS) 20 MG Tab Take 1 tablet (20 mg total) by mouth Every 3 (three) days. for 10 days 12 tablet 11    TRULICITY 0.75 mg/0.5 mL pen injector Inject 0.75 mg into the skin every 7 days.      valsartan (DIOVAN) 320 MG tablet Take 320 mg by mouth once daily.      XIGDUO XR 2.5-1,000 mg TBph Take by mouth.          Objective Findings:    Vital Signs:  Temp:  [97.9 °F (36.6 °C)-98.7 °F (37.1 °C)]   Pulse:  [107-129]   Resp:  [15-24]   BP: (128-176)/()   SpO2:  [97 %-100 %]   Body mass index is 38.87 kg/m².      Physical Exam  Vitals and nursing note reviewed.   Constitutional:       Appearance: He is obese.   HENT:      Head: Normocephalic.      Nose: Nose normal.      Mouth/Throat:      Mouth: Mucous membranes are moist.   Eyes:      Pupils: Pupils are equal, round, and reactive to light.   Cardiovascular:      Heart sounds: Normal heart sounds.   Pulmonary:      Effort: Pulmonary effort is normal.      Breath sounds: Normal breath sounds.   Abdominal:      General: Bowel sounds are normal. There is no distension.      Palpations: Abdomen is soft.      Tenderness: There is no abdominal tenderness.   Musculoskeletal:         General: Normal range of motion.      Cervical back: Normal range of motion.   Skin:     " Capillary Refill: Capillary refill takes less than 2 seconds.   Neurological:      General: No focal deficit present.      Mental Status: He is alert and oriented to person, place, and time.   Psychiatric:         Mood and Affect: Mood normal.         Behavior: Behavior normal.         Thought Content: Thought content normal.         Judgment: Judgment normal.             Labs:  Lab Results   Component Value Date    WBC 8.26 10/12/2022    HGB 15.7 10/12/2022    HCT 45.7 10/12/2022     10/12/2022    ALT 18 10/12/2022    AST 11 10/12/2022     10/12/2022    K 4.3 10/12/2022     10/12/2022    CREATININE 0.9 10/12/2022    BUN 14 10/12/2022    CO2 18 (L) 10/12/2022    TSH 0.438 10/12/2022    HGBA1C 11.2 (H) 02/09/2022             Imaging: 10/11 Abdominal US-Mild gallbladder sludge.  No cholelithiasis or evidence of acute cholecystitis. The common duct is not dilated, measuring 3.2 mm.  No intrahepatic ductal dilatation.  10/11 CT abdominal pelvis-The gallbladder contains calcified gallstones.    I have independently reviewed and interpreted the imaging above    Assessment:  Patient is a .42 y.o. y/o .male with  has a past medical history of Diabetes mellitus and Hypertension. Consulted to the GI service for cholelithiasis.             Recommendations:  1. Nausea and vomiting. Epigastric Abdominal pain. Odynophagia.  Pain is resolved andno current n/v episodes. CT and US differing on presence of stones vs sludge. Surgery rec HIDA scan. Will follow results. PPI increased to BID. Plan for endoscopy in vs outpatient.  -Will continue to follow    Thank you so much for allowing us to participate in the care of Chadd Alonzo . Please contact us if you have any additional questions.    Kemi Arellano NP  Gastroenterology  [facility] - Med S[facility]

## 2022-10-12 NOTE — SUBJECTIVE & OBJECTIVE
"Past Medical History:   Diagnosis Date    Diabetes mellitus     Hypertension        No past surgical history on file.    Review of patient's allergies indicates:  No Known Allergies    No current facility-administered medications on file prior to encounter.     Current Outpatient Medications on File Prior to Encounter   Medication Sig    acetaminophen (TYLENOL) 500 MG tablet Take 1 tablet (500 mg total) by mouth every 6 (six) hours as needed for Pain (As needed for pain and fever).    blood sugar diagnostic Strp 1 strip by Misc.(Non-Drug; Combo Route) route 2 (two) times daily with meals.    blood-glucose meter kit Use as instructed    clotrimazole (LOTRIMIN) 1 % cream Apply topically 2 (two) times daily.    gabapentin (NEURONTIN) 100 MG capsule Take 1 capsule (100 mg total) by mouth every evening.    ibuprofen (ADVIL,MOTRIN) 600 MG tablet Take 1 tablet (600 mg total) by mouth every 6 (six) hours as needed for Pain (Take with food as needed for mild-to-moderate pain).    insulin aspart U-100 (NOVOLOG) 100 unit/mL (3 mL) InPn pen Inject 8 Units into the skin 3 (three) times daily.    insulin detemir U-100 (LEVEMIR FLEXTOUCH) 100 unit/mL (3 mL) SubQ InPn pen Inject 25 Units into the skin once daily.    insulin syringe-needle U-100 1 mL 31 gauge x 15/64" Syrg 1 Syringe by Misc.(Non-Drug; Combo Route) route 3 (three) times daily with meals.    lancets Misc 1 lancet by Misc.(Non-Drug; Combo Route) route 2 (two) times daily with meals.    lancing device Misc 1 Device by Misc.(Non-Drug; Combo Route) route 2 (two) times daily with meals.    LIDOcaine HCl 2% (XYLOCAINE) 2 % Soln by Mucous Membrane route every 3 (three) hours as needed (As needed for sore throat). Apply 1 tsp to painful area every 3 hr as needed for pain    losartan (COZAAR) 100 MG tablet Take 1 tablet (100 mg total) by mouth once daily.    pantoprazole (PROTONIX) 40 MG tablet Take 1 tablet (40 mg total) by mouth once daily.    pen needle, diabetic 31 gauge x " "5/16" Ndle 1 each by Misc.(Non-Drug; Combo Route) route 2 (two) times daily with meals.    rosuvastatin (CRESTOR) 10 MG tablet Take 10 mg by mouth once daily.    sildenafiL (VIAGRA) 100 MG tablet Take 1 tablet (100 mg total) by mouth daily as needed for Erectile Dysfunction.    tadalafiL (CIALIS) 20 MG Tab Take 1 tablet (20 mg total) by mouth Every 3 (three) days. for 10 days    TRULICITY 0.75 mg/0.5 mL pen injector Inject 0.75 mg into the skin every 7 days.    valsartan (DIOVAN) 320 MG tablet Take 320 mg by mouth once daily.    XIGDUO XR 2.5-1,000 mg TBph Take by mouth.     Family History    None       Tobacco Use    Smoking status: Never    Smokeless tobacco: Never   Substance and Sexual Activity    Alcohol use: No    Drug use: No    Sexual activity: Not on file     Review of Systems  Constitutional:  Negative for chills and fever.   HENT:  Negative for sore throat.    Respiratory:  Negative for shortness of breath.    Cardiovascular:  Negative for chest pain.   Gastrointestinal:  Positive for abdominal pain, nausea and vomiting.   Genitourinary:  Negative for dysuria.   Musculoskeletal:  Negative for back pain.   Skin:  Negative for rash.   Neurological:  Negative for weakness.   Psychiatric/Behavioral:  Negative for confusion.    Objective:     Vital Signs (Most Recent):  Temp: 98.5 °F (36.9 °C) (10/12/22 0254)  Pulse: (!) 124 (10/12/22 0254)  Resp: (!) 23 (10/12/22 0254)  BP: (!) 165/84 (10/12/22 0246)  SpO2: 100 % (10/12/22 0254)   Vital Signs (24h Range):  Temp:  [97.9 °F (36.6 °C)-98.5 °F (36.9 °C)] 98.5 °F (36.9 °C)  Pulse:  [111-126] 124  Resp:  [18-24] 23  SpO2:  [97 %-100 %] 100 %  BP: (153-176)/() 165/84     Weight: 74.8 kg (165 lb)  Body mass index is 24.37 kg/m².    Physical Exam   Nursing note and vitals reviewed.  Constitutional: He appears well-developed and well-nourished. He is not diaphoretic. No distress.   HENT:   Head: Normocephalic and atraumatic.   Eyes: Conjunctivae and EOM are " normal. Pupils are equal, round, and reactive to light. No scleral icterus.   Neck: Neck supple.   Normal range of motion.  Cardiovascular:  Normal rate, regular rhythm, normal heart sounds and intact distal pulses.     Exam reveals no gallop and no friction rub.       No murmur heard.  Pulmonary/Chest: Breath sounds normal. No stridor. No respiratory distress. He has no wheezes. He has no rhonchi. He has no rales.   Abdominal: Abdomen is soft. Bowel sounds are normal. He exhibits no distension. There is abdominal tenderness. There is no rebound and no guarding.   Musculoskeletal:         General: No tenderness or edema. Normal range of motion.      Cervical back: Normal range of motion and neck supple.      Neurological: He is alert and oriented to person, place, and time. He has normal strength. No cranial nerve deficit. GCS score is 15. GCS eye subscore is 4. GCS verbal subscore is 5. GCS motor subscore is 6.   Skin: Skin is warm and dry. No rash noted.   Psychiatric: He has a normal mood and affect. His behavior is normal.      Significant Labs: All pertinent labs within the past 24 hours have been reviewed.    Significant Imaging: I have reviewed all pertinent imaging results/findings within the past 24 hours.

## 2022-10-12 NOTE — ANESTHESIA PROCEDURE NOTES
Intubation    Date/Time: 10/12/2022 2:12 PM  Performed by: Lane Seaman  Authorized by: Jovani Wade MD     Intubation:     Induction:  Intravenous    Intubated:  Postinduction    Mask Ventilation:  Very difficult with oral airway (two handed)    Attempts:  1    Attempted By:  Student    Method of Intubation:  Video laryngoscopy    Blade:  Encarnacion 3    Laryngeal View Grade: Grade IIA - cords partially seen      Difficult Airway Encountered?: No      Complications:  None    Airway Device:  Oral endotracheal tube    Airway Device Size:  7.5    Style/Cuff Inflation:  Cuffed (inflated to minimal occlusive pressure)    Tube secured:  22    Secured at:  The lips    Placement Verified By:  Capnometry    Complicating Factors:  None    Findings Post-Intubation:  BS equal bilateral

## 2022-10-12 NOTE — TRANSFER OF CARE
"Anesthesia Transfer of Care Note    Patient: Chadd Alonzo    Procedure(s) Performed: Procedure(s) (LRB):  CHOLECYSTECTOMY, LAPAROSCOPIC (N/A)    Patient location: PACU    Anesthesia Type: general    Transport from OR: Transported from OR on room air with adequate spontaneous ventilation    Post pain: adequate analgesia    Post assessment: no apparent anesthetic complications    Post vital signs: stable    Level of consciousness: alert and responds to stimulation    Nausea/Vomiting: no nausea/vomiting    Complications: none    Transfer of care protocol was followed      Last vitals:   Visit Vitals  BP (!) 168/82 (BP Location: Left arm, Patient Position: Lying)   Pulse (!) 112   Temp 37.2 °C (99 °F) (Temporal)   Resp 15   Ht 5' 9" (1.753 m)   Wt 119.4 kg (263 lb 3.7 oz)   SpO2 100%   BMI 38.87 kg/m²     "

## 2022-10-12 NOTE — ED PROVIDER NOTES
Encounter Date: 10/11/2022       History     Chief Complaint   Patient presents with    Abdominal Pain     Patient endorses ABD epigastric pain for 2 days, much belching. Endorses nausea and vomiting (last time 4pm). Took all Rxed meds this am but nothing in addition for his stomach pain. EKG done in triage and reviewed by MD.      42-year-old male with a history of diabetes, hypertension presenting with abdominal pain, belching, nausea, vomiting x2 days.  Patient reports history of similar.  Patient states he is unsure what caused it previously.  Denies fevers, chills.  Denies diarrhea.  Notes mild gas passage.  Denies hemoptysis, hematemesis.  No sick contacts, injury.  Denies lower extremity edema, chest pain, shortness of breath.  Previously in usual state of health.  Reports compliance with medications.      Review of patient's allergies indicates:  No Known Allergies  Past Medical History:   Diagnosis Date    Diabetes mellitus     Hypertension      History reviewed. No pertinent surgical history.  History reviewed. No pertinent family history.  Social History     Tobacco Use    Smoking status: Never    Smokeless tobacco: Never   Substance Use Topics    Alcohol use: No    Drug use: No     Review of Systems   Constitutional:  Negative for chills and fever.   HENT:  Negative for sore throat.    Respiratory:  Negative for shortness of breath.    Cardiovascular:  Negative for chest pain.   Gastrointestinal:  Positive for abdominal pain, nausea and vomiting.   Genitourinary:  Negative for dysuria.   Musculoskeletal:  Negative for back pain.   Skin:  Negative for rash.   Neurological:  Negative for weakness.   Psychiatric/Behavioral:  Negative for confusion.      Physical Exam     Initial Vitals [10/11/22 1836]   BP Pulse Resp Temp SpO2   (!) 173/106 (!) 124 (!) 24 97.9 °F (36.6 °C) 99 %      MAP       --         Physical Exam    Nursing note and vitals reviewed.  Constitutional: He appears well-developed and  well-nourished. He is not diaphoretic. No distress.   HENT:   Head: Normocephalic and atraumatic.   Eyes: Conjunctivae and EOM are normal. Pupils are equal, round, and reactive to light. No scleral icterus.   Neck: Neck supple.   Normal range of motion.  Cardiovascular:  Normal rate, regular rhythm, normal heart sounds and intact distal pulses.     Exam reveals no gallop and no friction rub.       No murmur heard.  Pulmonary/Chest: Breath sounds normal. No stridor. No respiratory distress. He has no wheezes. He has no rhonchi. He has no rales.   Abdominal: Abdomen is soft. Bowel sounds are normal. He exhibits no distension. There is abdominal tenderness. There is no rebound and no guarding.   Musculoskeletal:         General: No tenderness or edema. Normal range of motion.      Cervical back: Normal range of motion and neck supple.     Neurological: He is alert and oriented to person, place, and time. He has normal strength. No cranial nerve deficit. GCS score is 15. GCS eye subscore is 4. GCS verbal subscore is 5. GCS motor subscore is 6.   Skin: Skin is warm and dry. No rash noted.   Psychiatric: He has a normal mood and affect. His behavior is normal.       ED Course   Procedures  Labs Reviewed   CBC W/ AUTO DIFFERENTIAL - Abnormal; Notable for the following components:       Result Value    RBC 4.11 (*)     Hemoglobin 12.0 (*)     Hematocrit 35.6 (*)     Lymph # 0.9 (*)     Gran % 75.1 (*)     Lymph % 16.1 (*)     All other components within normal limits   COMPREHENSIVE METABOLIC PANEL - Abnormal; Notable for the following components:    CO2 19 (*)     Glucose 247 (*)     All other components within normal limits   URINALYSIS, REFLEX TO URINE CULTURE - Abnormal; Notable for the following components:    Specific Gravity, UA >1.030 (*)     Protein, UA 1+ (*)     Glucose, UA 4+ (*)     Ketones, UA 3+ (*)     All other components within normal limits    Narrative:     Specimen Source->Urine   BETA -  HYDROXYBUTYRATE, SERUM - Abnormal; Notable for the following components:    Beta-Hydroxybutyrate 3.4 (*)     All other components within normal limits   URINALYSIS MICROSCOPIC - Abnormal; Notable for the following components:    Hyaline Casts, UA 4 (*)     All other components within normal limits    Narrative:     Specimen Source->Urine   BETA - HYDROXYBUTYRATE, SERUM - Abnormal; Notable for the following components:    Beta-Hydroxybutyrate 2.4 (*)     All other components within normal limits   BASIC METABOLIC PANEL - Abnormal; Notable for the following components:    Sodium 147 (*)     Potassium 2.0 (*)     Chloride 129 (*)     CO2 11 (*)     Glucose 143 (*)     Calcium 4.4 (*)     Anion Gap 7 (*)     All other components within normal limits    Narrative:     K, Cl, and Ca. Critical results called and verbal readback obtained   from Hortencia Posada RN @ 0048 on 11Oct2022. [facility] 10/12/2022 00:49   DRUG SCREEN PANEL, URINE EMERGENCY - Abnormal; Notable for the following components:    Opiate Scrn, Ur Presumptive Positive (*)     All other components within normal limits    Narrative:     Specimen Source->Urine   BASIC METABOLIC PANEL - Abnormal; Notable for the following components:    Sodium 147 (*)     Potassium 2.1 (*)     Chloride 130 (*)     CO2 11 (*)     Glucose 129 (*)     Creatinine 0.4 (*)     Calcium 3.9 (*)     Anion Gap 6 (*)     All other components within normal limits    Narrative:     K, Cl, Ca, and Mg. Critical results called and verbal readback   obtained from Hortencia Posada RN @ 0136 on 12Oct2022. [facility]   10/12/2022 01:38   MAGNESIUM - Abnormal; Notable for the following components:    Magnesium 0.8 (*)     All other components within normal limits    Narrative:     K, Cl, Ca, and Mg. Critical results called and verbal readback   obtained from Hortencia Posada RN @ 0136 on 12Oct2022. [facility]   10/12/2022 01:38   PHOSPHORUS - Abnormal; Notable for the following components:    Phosphorus 1.4 (*)      All other components within normal limits   CBC W/ AUTO DIFFERENTIAL - Abnormal; Notable for the following components:    MPV 8.8 (*)     Lymph # 0.7 (*)     Gran % 87.6 (*)     Lymph % 8.1 (*)     All other components within normal limits   COMPREHENSIVE METABOLIC PANEL - Abnormal; Notable for the following components:    CO2 18 (*)     Glucose 227 (*)     All other components within normal limits   BETA - HYDROXYBUTYRATE, SERUM - Abnormal; Notable for the following components:    Beta-Hydroxybutyrate 2.1 (*)     All other components within normal limits   ISTAT PROCEDURE - Abnormal; Notable for the following components:    POC PCO2 21.9 (*)     POC PO2 36 (*)     POC HCO3 13.6 (*)     POC SATURATED O2 72 (*)     POC TCO2 14 (*)     All other components within normal limits   POCT GLUCOSE - Abnormal; Notable for the following components:    POCT Glucose 173 (*)     All other components within normal limits   POCT GLUCOSE - Abnormal; Notable for the following components:    POCT Glucose 251 (*)     All other components within normal limits   POCT GLUCOSE - Abnormal; Notable for the following components:    POCT Glucose 236 (*)     All other components within normal limits   POCT GLUCOSE - Abnormal; Notable for the following components:    POCT Glucose 155 (*)     All other components within normal limits   ISTAT PROCEDURE - Abnormal; Notable for the following components:    POC Glucose 226 (*)     POC TCO2 (MEASURED) 21 (*)     POC Anion Gap 20 (*)     All other components within normal limits   ISTAT PROCEDURE - Abnormal; Notable for the following components:    POC PCO2 30.6 (*)     POC PO2 61 (*)     POC HCO3 18.4 (*)     POC SATURATED O2 91 (*)     POC TCO2 19 (*)     All other components within normal limits   POCT GLUCOSE - Abnormal; Notable for the following components:    POCT Glucose 187 (*)     All other components within normal limits   LIPASE   D DIMER, QUANTITATIVE   TROPONIN I   LACTIC ACID, PLASMA    ALCOHOL,MEDICAL (ETHANOL)   LACTIC ACID, PLASMA   MAGNESIUM   PHOSPHORUS   POCT GLUCOSE MONITORING CONTINUOUS        ECG Results              EKG 12-LEAD (Final result)  Result time 10/12/22 13:33:59      Final result by Unknown User (10/12/22 13:33:59)                                      Imaging Results              US Abdomen Limited (Final result)  Result time 10/11/22 23:37:33      Final result by Sierra Bell MD (10/11/22 23:37:33)                   Impression:      Limited examination.  Mild gallbladder sludge.  No cholelithiasis or evidence of acute cholecystitis.    Mild hepatic steatosis.      Electronically signed by: Sierra Bell  Date:    10/11/2022  Time:    23:37               Narrative:    EXAMINATION:  ULTRASOUND ABDOMEN LIMITED    CLINICAL HISTORY:  Epigastric pain for 2 days and much belching.    TECHNIQUE:  Limited ultrasound of the right upper quadrant of the abdomen (including pancreas, liver, gallbladder, common bile duct, and spleen) was performed.    COMPARISON:  None.    FINDINGS:  Liver: Normal in size, measuring 15 cm. Mild increase in echotexture.  No focal hepatic lesions.    Gallbladder: Small gallbladder sludge.  No calculi.  No wall thickening, or pericholecystic fluid.  No sonographic Mendez's sign.    Biliary system: The common duct is not dilated, measuring 3.2 mm.  No intrahepatic ductal dilatation.    Spleen: The spleen is normal in size, measuring 9.5 x 2.6 cm.    Miscellaneous: The pancreas is partially visualized by overlying bowel gas.                                       CT Abdomen Pelvis With Contrast (Final result)  Result time 10/11/22 21:23:06      Final result by Sierra Bell MD (10/11/22 21:23:06)                   Impression:      No acute abdominal or pelvic pathology CT of the abdomen and pelvis with contrast.      Electronically signed by: Sierra Bell  Date:    10/11/2022  Time:    21:23               Narrative:    EXAMINATION:  CT OF  ABDOMEN PELVIS WITH    CLINICAL HISTORY:  Pancreatitis, acute, severe;    TECHNIQUE:  5 mm enhanced axial images were obtained from the lung bases through the greater trochanters.  A mL of Omnipaque 350 was injected.    COMPARISON:  None.    FINDINGS:  The liver, spleen, pancreas, kidneys, and adrenal glands are unremarkable. The gallbladder contains calcified gallstones.    There is no definite evidence for abdominal adenopathy or ascites.    There are no pelvic masses or adenopathy.    There is no free fluid in the pelvis.    There is moderate bibasilar atelectasis.                                       Medications   losartan tablet 100 mg (100 mg Oral Given 10/12/22 0810)   atorvastatin tablet 40 mg (has no administration in time range)   glucagon (human recombinant) injection 1 mg (has no administration in time range)   dextrose 10% bolus 125 mL (has no administration in time range)   dextrose 10% bolus 250 mL (has no administration in time range)   insulin aspart U-100 pen 0-5 Units (has no administration in time range)   insulin detemir U-100 pen 25 Units (25 Units Subcutaneous Given 10/12/22 0421)   dextrose 10% bolus 125 mL (has no administration in time range)   sodium chloride 0.9% flush 10 mL (has no administration in time range)   naloxone 0.4 mg/mL injection 0.02 mg (has no administration in time range)   glucose chewable tablet 16 g (has no administration in time range)   glucose chewable tablet 24 g (has no administration in time range)   glucagon (human recombinant) injection 1 mg (has no administration in time range)   ondansetron injection 4 mg (has no administration in time range)   HYDROmorphone injection 0.5 mg (0.5 mg Intravenous Given 10/12/22 0419)   hydrALAZINE injection 10 mg (has no administration in time range)   enoxaparin injection 40 mg (has no administration in time range)   0.9%  NaCl infusion ( Intravenous Rate/Dose Change 10/12/22 0816)   pantoprazole injection 40 mg (has no  administration in time range)   polyethylene glycol packet 17 g (has no administration in time range)   polyethylene glycol packet 17 g (has no administration in time range)   sodium chloride 0.9% bolus 1,000 mL (0 mLs Intravenous Stopped 10/11/22 2037)   morphine injection 4 mg (4 mg Intravenous Given 10/11/22 2029)   ondansetron injection 4 mg (4 mg Intravenous Given 10/11/22 2028)   iohexoL (OMNIPAQUE 350) injection 80 mL (80 mLs Intravenous Given 10/11/22 2055)   insulin regular injection 7 Units 0.07 mL (7 Units Intravenous Given 10/11/22 2319)   morphine injection 4 mg (4 mg Intravenous Given 10/11/22 2316)   ondansetron injection 4 mg (4 mg Intravenous Given 10/11/22 2314)   insulin detemir U-100 pen 15 Units (15 Units Subcutaneous Given 10/11/22 2329)   sodium chloride 0.9% bolus 1,000 mL (0 mLs Intravenous Stopped 10/12/22 0255)   cefazolin (ANCEF) 2 gram in dextrose 5% 50 mL IVPB (premix) (2 g Intravenous Given 10/12/22 1416)     Medical Decision Making:   Initial Assessment:   42-year-old male presenting with epigastric pain.  Tachycardic on arrival.  History of DKA.  Abdominal tenderness noted.  No rebound or guarding.  Notes passage of gas.  Differential is broad and includes pancreatitis, DKA, obstruction, biliary pathology, infection.  We will get labs, give IV fluids, provide pain control, antiemetics, reassess.  ED Management:  Patient remains tachycardic despite IV fluids.  Right upper quadrant pain persists.  Labs reveal possible early DKA though no anion gap.  Insulin given.  Ketones noted, beta hydroxybutyrate elevated.  Right upper quadrant ultrasound ordered.  Patient is being signed out to Dr. Jasso pending ultrasound results, possible placement ICU pending re-evaluation for DKA.                Assumed care patient at turnover, pain medication has been given, IV fluids given, patient continues to be tachycardic into the 120s.  Patient was reassessed with some mild epigastric tenderness  however significantly improved as reported by patient.  No wounds noted, patient reports that he feels well despite persistent tachycardia.  Repeat basic metabolic panel repeated twice and appeared to be a dilute sample as his electrolytes and creatinine were erroneously reported.  Repeat point of care testing confirms some slight improvement in his bicarb, anion gap also remaining slightly elevated.  Without pH alteration at this point it is questionable whether he was truly an early DKA however given his persistent tachycardia and unclear etiology with persistent metabolic derangement will place in observation for continued IV fluids, insulin administration, pain management and further assessment. Discussed diagnosis and further treatment with patient at bedside. All questions answered, patient transferred to floor improved and stable.              Clinical Impression:   Final diagnoses:  [R06.02] Shortness of breath  [K80.20] Cholelithiasis        ED Disposition Condition    Observation                 Klaus Mcbride MD  10/11/22 [id number]       Shaji Jasso MD  10/12/22 [id number]

## 2022-10-12 NOTE — H&P
[facility] Medicine  History & Physical    Patient Name: Chadd Alonzo  MRN: [id number]  Patient Class: OP- Observation  Admission Date: 10/11/2022  Attending Physician: Lawrence Grant MD   Primary Care Provider: [facility]         Patient information was obtained from patient, past medical records and ER records.     Subjective:     Principal Problem:Cholelithiasis    Chief Complaint:   Chief Complaint   Patient presents with    Abdominal Pain     Patient endorses ABD epigastric pain for 2 days, much belching. Endorses nausea and vomiting (last time 4pm). Took all Rxed meds this am but nothing in addition for his stomach pain. EKG done in triage and reviewed by MD.         HPI: 42 year old male present to ED with c/o with abdominal pain, belching, nausea, vomiting x2 days.  Patient reports history of similar.  Denies fevers, chills.  Denies diarrhea.  Notes mild gas passage.  Denies hemoptysis, hematemesis. PMHx of DM  and HTN.          Past Medical History:   Diagnosis Date    Diabetes mellitus     Hypertension        No past surgical history on file.    Review of patient's allergies indicates:  No Known Allergies    No current facility-administered medications on file prior to encounter.     Current Outpatient Medications on File Prior to Encounter   Medication Sig    acetaminophen (TYLENOL) 500 MG tablet Take 1 tablet (500 mg total) by mouth every 6 (six) hours as needed for Pain (As needed for pain and fever).    blood sugar diagnostic Strp 1 strip by Misc.(Non-Drug; Combo Route) route 2 (two) times daily with meals.    blood-glucose meter kit Use as instructed    clotrimazole (LOTRIMIN) 1 % cream Apply topically 2 (two) times daily.    gabapentin (NEURONTIN) 100 MG capsule Take 1 capsule (100 mg total) by mouth every evening.    ibuprofen (ADVIL,MOTRIN) 600 MG tablet Take 1 tablet (600 mg total) by mouth every 6 (six) hours as needed for Pain (Take with food as needed  "for mild-to-moderate pain).    insulin aspart U-100 (NOVOLOG) 100 unit/mL (3 mL) InPn pen Inject 8 Units into the skin 3 (three) times daily.    insulin detemir U-100 (LEVEMIR FLEXTOUCH) 100 unit/mL (3 mL) SubQ InPn pen Inject 25 Units into the skin once daily.    insulin syringe-needle U-100 1 mL 31 gauge x 15/64" Syrg 1 Syringe by Misc.(Non-Drug; Combo Route) route 3 (three) times daily with meals.    lancets Misc 1 lancet by Misc.(Non-Drug; Combo Route) route 2 (two) times daily with meals.    lancing device Misc 1 Device by Misc.(Non-Drug; Combo Route) route 2 (two) times daily with meals.    LIDOcaine HCl 2% (XYLOCAINE) 2 % Soln by Mucous Membrane route every 3 (three) hours as needed (As needed for sore throat). Apply 1 tsp to painful area every 3 hr as needed for pain    losartan (COZAAR) 100 MG tablet Take 1 tablet (100 mg total) by mouth once daily.    pantoprazole (PROTONIX) 40 MG tablet Take 1 tablet (40 mg total) by mouth once daily.    pen needle, diabetic 31 gauge x 5/16" Ndle 1 each by Misc.(Non-Drug; Combo Route) route 2 (two) times daily with meals.    rosuvastatin (CRESTOR) 10 MG tablet Take 10 mg by mouth once daily.    sildenafiL (VIAGRA) 100 MG tablet Take 1 tablet (100 mg total) by mouth daily as needed for Erectile Dysfunction.    tadalafiL (CIALIS) 20 MG Tab Take 1 tablet (20 mg total) by mouth Every 3 (three) days. for 10 days    TRULICITY 0.75 mg/0.5 mL pen injector Inject 0.75 mg into the skin every 7 days.    valsartan (DIOVAN) 320 MG tablet Take 320 mg by mouth once daily.    XIGDUO XR 2.5-1,000 mg TBph Take by mouth.     Family History    None       Tobacco Use    Smoking status: Never    Smokeless tobacco: Never   Substance and Sexual Activity    Alcohol use: No    Drug use: No    Sexual activity: Not on file     Review of Systems  Constitutional:  Negative for chills and fever.   HENT:  Negative for sore throat.    Respiratory:  Negative for shortness of breath.    Cardiovascular:  " Negative for chest pain.   Gastrointestinal:  Positive for abdominal pain, nausea and vomiting.   Genitourinary:  Negative for dysuria.   Musculoskeletal:  Negative for back pain.   Skin:  Negative for rash.   Neurological:  Negative for weakness.   Psychiatric/Behavioral:  Negative for confusion.    Objective:     Vital Signs (Most Recent):  Temp: 98.5 °F (36.9 °C) (10/12/22 0254)  Pulse: (!) 124 (10/12/22 0254)  Resp: (!) 23 (10/12/22 0254)  BP: (!) 165/84 (10/12/22 0246)  SpO2: 100 % (10/12/22 0254)   Vital Signs (24h Range):  Temp:  [97.9 °F (36.6 °C)-98.5 °F (36.9 °C)] 98.5 °F (36.9 °C)  Pulse:  [111-126] 124  Resp:  [18-24] 23  SpO2:  [97 %-100 %] 100 %  BP: (153-176)/() 165/84     Weight: 74.8 kg (165 lb)  Body mass index is 24.37 kg/m².    Physical Exam   Nursing note and vitals reviewed.  Constitutional: He appears well-developed and well-nourished. He is not diaphoretic. No distress.   HENT:   Head: Normocephalic and atraumatic.   Eyes: Conjunctivae and EOM are normal. Pupils are equal, round, and reactive to light. No scleral icterus.   Neck: Neck supple.   Normal range of motion.  Cardiovascular:  Normal rate, regular rhythm, normal heart sounds and intact distal pulses.     Exam reveals no gallop and no friction rub.       No murmur heard.  Pulmonary/Chest: Breath sounds normal. No stridor. No respiratory distress. He has no wheezes. He has no rhonchi. He has no rales.   Abdominal: Abdomen is soft. Bowel sounds are normal. He exhibits no distension. There is abdominal tenderness. There is no rebound and no guarding.   Musculoskeletal:         General: No tenderness or edema. Normal range of motion.      Cervical back: Normal range of motion and neck supple.      Neurological: He is alert and oriented to person, place, and time. He has normal strength. No cranial nerve deficit. GCS score is 15. GCS eye subscore is 4. GCS verbal subscore is 5. GCS motor subscore is 6.   Skin: Skin is warm and dry.  No rash noted.   Psychiatric: He has a normal mood and affect. His behavior is normal.      Significant Labs: All pertinent labs within the past 24 hours have been reviewed.    Significant Imaging: I have reviewed all pertinent imaging results/findings within the past 24 hours.    Assessment/Plan:     * Cholelithiasis  Consult GI and general surgery  NPO  IVF  Pain control  CT abdomen/pelvis: The gallbladder contains calcified gallstones  US abdomen: Limited examination.  Mild gallbladder sludge.  No cholelithiasis or evidence of acute cholecystitis.         Tachycardia  Unknown reason patient tachycardia, no fever or elevated WBCs...  IVF given in ED  Tele monitoring  EKG as needed       Low TSH level  On admission 0.290  Repeat level in 6 wks outpatient      Hyperglycemia  Patient's FSGs are uncontrolled due to hyperglycemia on current medication regimen.  Last A1c reviewed-   Lab Results   Component Value Date    HGBA1C 11.2 (H) 02/09/2022     Most recent fingerstick glucose reviewed-   Recent Labs   Lab 10/11/22  2025 10/11/22  2215 10/11/22  2309 10/12/22  0012   POCTGLUCOSE 173* 251* 236* 155*     Current correctional scale  Low  Maintain anti-hyperglycemic dose as follows-   Antihyperglycemics (From admission, onward)      Start     Stop Route Frequency Ordered    10/12/22 0430  insulin detemir U-100 pen 25 Units         -- SubQ Nightly 10/12/22 0324    10/12/22 0423  insulin aspart U-100 pen 0-5 Units         -- SubQ Every 6 hours PRN 10/12/22 0323          Hold Oral hypoglycemics while patient is in the hospital.    Nausea and vomiting  Antiemetics as needed       Essential hypertension  Patient likely elevated due to pain  Resume home losartan  Hydralazine as needed        VTE Risk Mitigation (From admission, onward)           Ordered     enoxaparin injection 40 mg  Daily         10/12/22 0341     IP VTE LOW RISK PATIENT  Once         10/12/22 0327     Place sequential compression device  Until  discontinued         10/12/22 0327                   As clarification, on 10/12/22 @0330, patient should be placed in hospital observation services under my care in collaboration with MD Adalid Pena NP  Department of Hospital Medicine   [facility] - Emergency Dept

## 2022-10-12 NOTE — OP NOTE
.Laparoscopic Cholecystectomy Procedure Note    Pre-operative Diagnosis: Calculus of gallbladder with other cholecystitis, without mention of obstruction    Post-operative Diagnosis: Same    Surgery Date: 10/12/2022     Surgeon: Anjel Akhtar     Assistants: Dr. ABRAM Mathis    Anesthesia: General endotracheal anesthesia    Indications: This patient presents with symptomatic gallbladder disease and will undergo laparoscopic cholecystectomy.CT scan showed stones, and US demonstrated sludge.    The patient was seen again in the Holding Room. The risks and benefits, including but not limited to common bile duct injury, cystic duct stump leak, bleeding and infection were explained to the patient, who acknowledges these risks and wishes to proceed.    Procedure Details   The patient was taken to Operating Room, identified as Chadd Alonzo and the procedure verified as Laparoscopic Cholecystectomy. A Time Out was held and the above information confirmed.    Prior to the induction of general anesthesia, antibiotic prophylaxis was administered. General endotracheal anesthesia was then administered and tolerated well. After the induction, the abdomen was prepped in the usual sterile fashion. The patient was positioned in the supine position with the arms extended.    A vertical mid-line incision was made and carried down to the fascia.  The fascia was incised sharply, and the peritoneum was entered bluntly.  A Kaylan trocar was placed through the incision.  Gas was insufflated, establishing a pneumoperitoneum.  A 30 degree, 5 mm scope was then placed through the trocar and the abdominal cavity was explored.    The underlying bowel was inspected and found to be free of injury.  The remaining ports were placed under direct visualization.  All were 5 mm ports: one in the subxiphoid area, one in the right upper quadrant.    The gallbladder was grasped and retracted cranially and laterally exposing the triangle of Calot.  Dissection  began on the neck of the gallbladder and continued proximally until a ductal structure was identified.  A critical anterior and posterior view of the duct was obtained to confirm it was the cystic duct.  It was then doubly clipped and transected.  Continued dissection demonstrated the cystic artery. Before the artery was clipped, it tore prximal to the dissection.  The gallbladder was then dissected from the liver bed using electrocautery and removed through the umbilical trocar. Additional trocar was placed for retraction and suction and proximal control of the vessel was obtained with graspers.  A clip was then used to obtain hemostasis. During this dissection the clips on the cystic duct were in-advertantly removed. An endoloop was then carefully placed on the cystic duct stump.  Arixtra was then sprayed into the wound bed.    The dissection area was then inspected for hemostasis which was evident.  The dissection area was also inspected for a bile leak or bowel injury which was not evident.  The pneumoperitoneum was then expelled from the abdomen, and all ports were removed.  The umbilical fascia was reapproximated with a single figure-of-eight 0 vicryl suture.  The skin over all incisions were closed using 4-0 Vicryl deep dermal sutures.  A sterile dressing was applied.    Instrument, sponge, and needle counts were correct at closure and at the conclusion of the case.  There were no immediate complications.    Findings:  Cholecystitis with Cholelithiasis    Estimated Blood Loss:  250           Drains: none           Total IV Fluids: see anesthesia records           Specimens: Gallbladder             Complications: None; patient tolerated the procedure well.           Disposition: PACU - hemodynamically stable.           Condition: stable

## 2022-10-12 NOTE — NURSING
[facility] [place]  Nurses Note -- 4 Eyes      10/12/2022       Skin assessed on: Admit      [x] No Pressure Injuries Present    []Prevention Measures Documented    [] Yes LDA  for Pressure Injury Previously documented     [] Yes New Pressure Injury Discovered   [] LDA for New Pressure Injury Added      Attending RN:  Tanika Jewell, RN     Second RN:  Tommy Cheung LPN

## 2022-10-13 VITALS
TEMPERATURE: 98 F | DIASTOLIC BLOOD PRESSURE: 90 MMHG | HEIGHT: 69 IN | HEART RATE: 118 BPM | SYSTOLIC BLOOD PRESSURE: 173 MMHG | RESPIRATION RATE: 19 BRPM | WEIGHT: 263.25 LBS | OXYGEN SATURATION: 99 % | BODY MASS INDEX: 38.99 KG/M2

## 2022-10-13 LAB
ANION GAP SERPL CALC-SCNC: NORMAL MMOL/L (ref 8–16)
ANION GAP SERPL CALC-SCNC: NORMAL MMOL/L (ref 8–16)
B-OH-BUTYR BLD STRIP-SCNC: 2.4 MMOL/L (ref 0–0.5)
BASOPHILS # BLD AUTO: 0.03 K/UL (ref 0–0.2)
BASOPHILS NFR BLD: 0.3 % (ref 0–1.9)
BUN SERPL-MCNC: NORMAL MG/DL (ref 6–20)
BUN SERPL-MCNC: NORMAL MG/DL (ref 6–20)
CALCIUM SERPL-MCNC: NORMAL MG/DL (ref 8.7–10.5)
CALCIUM SERPL-MCNC: NORMAL MG/DL (ref 8.7–10.5)
CHLORIDE SERPL-SCNC: NORMAL MMOL/L (ref 95–110)
CHLORIDE SERPL-SCNC: NORMAL MMOL/L (ref 95–110)
CO2 SERPL-SCNC: NORMAL MMOL/L (ref 23–29)
CO2 SERPL-SCNC: NORMAL MMOL/L (ref 23–29)
CREAT SERPL-MCNC: NORMAL MG/DL (ref 0.5–1.4)
CREAT SERPL-MCNC: NORMAL MG/DL (ref 0.5–1.4)
DIFFERENTIAL METHOD: ABNORMAL
EOSINOPHIL # BLD AUTO: 0 K/UL (ref 0–0.5)
EOSINOPHIL NFR BLD: 0.3 % (ref 0–8)
ERYTHROCYTE [DISTWIDTH] IN BLOOD BY AUTOMATED COUNT: 12.8 % (ref 11.5–14.5)
EST. GFR  (AFRICAN AMERICAN): NORMAL ML/MIN/1.73 M^2
EST. GFR  (AFRICAN AMERICAN): NORMAL ML/MIN/1.73 M^2
EST. GFR  (NO RACE VARIABLE): NORMAL ML/MIN/1.73 M^2
EST. GFR  (NO RACE VARIABLE): NORMAL ML/MIN/1.73 M^2
EST. GFR  (NON AFRICAN AMERICAN): NORMAL ML/MIN/1.73 M^2
EST. GFR  (NON AFRICAN AMERICAN): NORMAL ML/MIN/1.73 M^2
GLUCOSE SERPL-MCNC: NORMAL MG/DL (ref 70–110)
GLUCOSE SERPL-MCNC: NORMAL MG/DL (ref 70–110)
HCT VFR BLD AUTO: 41 % (ref 40–54)
HGB BLD-MCNC: 14.2 G/DL (ref 14–18)
IMM GRANULOCYTES # BLD AUTO: 0.02 K/UL (ref 0–0.04)
IMM GRANULOCYTES NFR BLD AUTO: 0.2 % (ref 0–0.5)
LYMPHOCYTES # BLD AUTO: 2.4 K/UL (ref 1–4.8)
LYMPHOCYTES NFR BLD: 23.7 % (ref 18–48)
MAGNESIUM SERPL-MCNC: NORMAL MG/DL (ref 1.6–2.6)
MCH RBC QN AUTO: 29 PG (ref 27–31)
MCHC RBC AUTO-ENTMCNC: 34.6 G/DL (ref 32–36)
MCV RBC AUTO: 84 FL (ref 82–98)
MONOCYTES # BLD AUTO: 1.1 K/UL (ref 0.3–1)
MONOCYTES NFR BLD: 10.9 % (ref 4–15)
NEUTROPHILS # BLD AUTO: 6.4 K/UL (ref 1.8–7.7)
NEUTROPHILS NFR BLD: 64.6 % (ref 38–73)
NRBC BLD-RTO: 0 /100 WBC
PHOSPHATE SERPL-MCNC: NORMAL MG/DL (ref 2.7–4.5)
PLATELET # BLD AUTO: 251 K/UL (ref 150–450)
PMV BLD AUTO: 9.1 FL (ref 9.2–12.9)
POCT GLUCOSE: 157 MG/DL (ref 70–110)
POCT GLUCOSE: 180 MG/DL (ref 70–110)
POCT GLUCOSE: 191 MG/DL (ref 70–110)
POTASSIUM SERPL-SCNC: NORMAL MMOL/L (ref 3.5–5.1)
POTASSIUM SERPL-SCNC: NORMAL MMOL/L (ref 3.5–5.1)
RBC # BLD AUTO: 4.9 M/UL (ref 4.6–6.2)
SODIUM SERPL-SCNC: NORMAL MMOL/L (ref 136–145)
SODIUM SERPL-SCNC: NORMAL MMOL/L (ref 136–145)
WBC # BLD AUTO: 9.97 K/UL (ref 3.9–12.7)

## 2022-10-13 PROCEDURE — 36415 COLL VENOUS BLD VENIPUNCTURE: CPT | Performed by: STUDENT IN AN ORGANIZED HEALTH CARE EDUCATION/TRAINING PROGRAM

## 2022-10-13 PROCEDURE — G0378 HOSPITAL OBSERVATION PER HR: HCPCS

## 2022-10-13 PROCEDURE — 85025 COMPLETE CBC W/AUTO DIFF WBC: CPT | Performed by: STUDENT IN AN ORGANIZED HEALTH CARE EDUCATION/TRAINING PROGRAM

## 2022-10-13 PROCEDURE — C9113 INJ PANTOPRAZOLE SODIUM, VIA: HCPCS | Performed by: NURSE PRACTITIONER

## 2022-10-13 PROCEDURE — 63600175 PHARM REV CODE 636 W HCPCS: Performed by: NURSE PRACTITIONER

## 2022-10-13 PROCEDURE — 25000003 PHARM REV CODE 250: Performed by: NURSE PRACTITIONER

## 2022-10-13 PROCEDURE — 25000003 PHARM REV CODE 250: Performed by: STUDENT IN AN ORGANIZED HEALTH CARE EDUCATION/TRAINING PROGRAM

## 2022-10-13 RX ORDER — OXYCODONE HYDROCHLORIDE 5 MG/1
5 TABLET ORAL ONCE
Status: COMPLETED | OUTPATIENT
Start: 2022-10-13 | End: 2022-10-13

## 2022-10-13 RX ORDER — ONDANSETRON HYDROCHLORIDE 8 MG/1
8 TABLET, FILM COATED ORAL EVERY 8 HOURS PRN
Qty: 20 TABLET | Refills: 0 | Status: SHIPPED | OUTPATIENT
Start: 2022-10-13 | End: 2022-10-20

## 2022-10-13 RX ORDER — HYDROCODONE BITARTRATE AND ACETAMINOPHEN 5; 325 MG/1; MG/1
1 TABLET ORAL EVERY 6 HOURS PRN
Qty: 20 TABLET | Refills: 0 | Status: SHIPPED | OUTPATIENT
Start: 2022-10-13 | End: 2022-10-20

## 2022-10-13 RX ORDER — ONDANSETRON 4 MG/1
4 TABLET, FILM COATED ORAL ONCE
Status: COMPLETED | OUTPATIENT
Start: 2022-10-13 | End: 2022-10-13

## 2022-10-13 RX ADMIN — ONDANSETRON 4 MG: 2 INJECTION INTRAMUSCULAR; INTRAVENOUS at 07:10

## 2022-10-13 RX ADMIN — HYDRALAZINE HYDROCHLORIDE 50 MG: 25 TABLET, FILM COATED ORAL at 05:10

## 2022-10-13 RX ADMIN — LOSARTAN POTASSIUM 100 MG: 25 TABLET, FILM COATED ORAL at 09:10

## 2022-10-13 RX ADMIN — HYDRALAZINE HYDROCHLORIDE 50 MG: 25 TABLET, FILM COATED ORAL at 02:10

## 2022-10-13 RX ADMIN — ONDANSETRON HYDROCHLORIDE 4 MG: 4 TABLET, FILM COATED ORAL at 11:10

## 2022-10-13 RX ADMIN — PANTOPRAZOLE SODIUM 40 MG: 40 INJECTION, POWDER, FOR SOLUTION INTRAVENOUS at 09:10

## 2022-10-13 RX ADMIN — OXYCODONE 5 MG: 5 TABLET ORAL at 11:10

## 2022-10-13 RX ADMIN — POLYETHYLENE GLYCOL 3350 17 G: 17 POWDER, FOR SOLUTION ORAL at 09:10

## 2022-10-13 NOTE — NURSING
Pt discharged per MD order. IV removed. Catheter tip intact. No distress noted. Discharge instructions explained. AVS given to patient and placed in Blue Folder. Pt verbalized understanding. VSS. Afebrile. No complaints of pain, N/V, diarrhea, or SOB. Rx brought to patient room. Patient has all belongings packed and waiting for transport. Family at bedside .

## 2022-10-13 NOTE — SUBJECTIVE & OBJECTIVE
Interval History: No issues overnight. Pain well controlled. Tolerating a regular diet    Medications:  Continuous Infusions:  Scheduled Meds:   atorvastatin  40 mg Oral QHS    enoxaparin  40 mg Subcutaneous Daily    hydrALAZINE  50 mg Oral Q8H    insulin detemir U-100  25 Units Subcutaneous QHS    losartan  100 mg Oral Daily    ondansetron  4 mg Oral Once    pantoprazole  40 mg Intravenous BID    polyethylene glycol  17 g Oral Daily     PRN Meds:dextrose 10%, dextrose 10%, dextrose 10%, fentaNYL, glucagon (human recombinant), glucagon (human recombinant), glucose, glucose, hydrALAZINE, HYDROmorphone, HYDROmorphone, insulin aspart U-100, naloxone, ondansetron, polyethylene glycol, sodium chloride 0.9%, sodium chloride 0.9%     Review of patient's allergies indicates:  No Known Allergies  Objective:     Vital Signs (Most Recent):  Temp: 97.8 °F (36.6 °C) (10/13/22 0734)  Pulse: 104 (10/13/22 0734)  Resp: 19 (10/13/22 0734)  BP: 118/61 (10/13/22 0734)  SpO2: 98 % (10/13/22 0734) Vital Signs (24h Range):  Temp:  [97.7 °F (36.5 °C)-99 °F (37.2 °C)] 97.8 °F (36.6 °C)  Pulse:  [100-112] 104  Resp:  [15-20] 19  SpO2:  [88 %-100 %] 98 %  BP: (118-180)/(61-90) 118/61     Weight: 119.4 kg (263 lb 3.7 oz)  Body mass index is 38.87 kg/m².    Intake/Output - Last 3 Shifts         10/11 0700  10/12 0659 10/12 0700  10/13 0659 10/13 0700  10/14 0659    P.O.  240 240    IV Piggyback 1999 1450     Total Intake(mL/kg) 1999 (16.9) 1690 (14.2) 240 (2)    Urine (mL/kg/hr)  700 (0.2)     Stool  0     Blood  250     Total Output  950     Net +1999 +740 +240           Urine Occurrence  1 x     Stool Occurrence  0 x             Physical Exam  Vitals and nursing note reviewed.   Constitutional:       General: He is not in acute distress.  Cardiovascular:      Rate and Rhythm: Normal rate.      Pulses: Normal pulses.   Pulmonary:      Effort: Pulmonary effort is normal. No respiratory distress.   Abdominal:      General: There is no  distension.      Palpations: Abdomen is soft.      Tenderness: There is no abdominal tenderness.      Comments: Laparoscopic incisions clean/dry with steri-strips    Neurological:      Mental Status: He is alert.       Significant Labs:  I have reviewed all pertinent lab results within the past 24 hours.  CBC:   Recent Labs   Lab 10/12/22  0345   WBC 8.26   RBC 5.39   HGB 15.7   HCT 45.7      MCV 85   MCH 29.1   MCHC 34.4     BMP:   Recent Labs   Lab 10/12/22  0345   *      K 4.3      CO2 18*   BUN 14   CREATININE 0.9   CALCIUM 8.9   MG 1.9     CMP:   Recent Labs   Lab 10/12/22  0345   *   CALCIUM 8.9   ALBUMIN 4.2   PROT 7.5      K 4.3   CO2 18*      BUN 14   CREATININE 0.9   ALKPHOS 57   ALT 18   AST 11   BILITOT 0.5       Significant Diagnostics:  I have reviewed all pertinent imaging results/findings within the past 24 hours.

## 2022-10-13 NOTE — PROGRESS NOTES
[facility] Medicine  Progress Note    Patient Name: Chadd Alonzo  MRN: [id number]  Patient Class: OP- Observation   Admission Date: 10/11/2022  Length of Stay: 0 days  Attending Physician: Lalo Obrien MD  Primary Care Provider: [facility]        Subjective:     Principal Problem:Cholelithiasis        HPI:  42 year old male present to ED with c/o with abdominal pain, belching, nausea, vomiting x2 days.  Patient reports history of similar.  Denies fevers, chills.  Denies diarrhea.  Notes mild gas passage.  Denies hemoptysis, hematemesis. PMHx of DM  and HTN.          Overview/Hospital Course:  Admitted with epigastric pain. No obstruction visualized. S/p cholecystectomy 10/12. Discharge today 10/13 if tolerating diet and pain managed.       NAEON. Denies SOB or CP.   UOP wnl.     Review of Systems  Constitutional:  Negative for chills and fever.   HENT:  Negative for sore throat.    Respiratory:  Negative for shortness of breath.    Cardiovascular:  Negative for chest pain.   Gastrointestinal:  Positive for abdominal pain, nausea   Genitourinary:  Negative for dysuria.   Musculoskeletal:  Negative for back pain.   Skin:  Negative for rash.   Neurological:  Negative for weakness.   Psychiatric/Behavioral:  Negative for confusion.        Objective:     Vital Signs (Most Recent):  Temp: 98.4 °F (36.9 °C) (10/13/22 0422)  Pulse: 104 (10/13/22 0422)  Resp: 20 (10/13/22 0422)  BP: (!) 154/79 (10/13/22 0422)  SpO2: 100 % (10/13/22 0422)   Vital Signs (24h Range):  Temp:  [97.7 °F (36.5 °C)-99 °F (37.2 °C)] 98.4 °F (36.9 °C)  Pulse:  [100-112] 104  Resp:  [15-20] 20  SpO2:  [88 %-100 %] 100 %  BP: (128-180)/(79-90) 154/79     Weight: 119.4 kg (263 lb 3.7 oz)  Body mass index is 38.87 kg/m².    Physical Exam   Nursing note and vitals reviewed.  Constitutional: He appears well-developed and well-nourished. He is not diaphoretic. No distress.   HENT:   Head: Normocephalic and atraumatic.   Eyes:  Conjunctivae and EOM are normal. Pupils are equal, round, and reactive to light. No scleral icterus.   Neck: Neck supple.   Normal range of motion.  Cardiovascular:  Normal rate, regular rhythm, normal heart sounds and intact distal pulses.     Exam reveals no gallop and no friction rub.       No murmur heard.  Pulmonary/Chest: Breath sounds normal. No stridor. No respiratory distress. He has no wheezes. He has no rhonchi. He has no rales.   Abdominal: Abdomen is soft. Bowel sounds are normal. He exhibits no distension. There is abdominal tenderness. There is no rebound and no guarding.   Musculoskeletal:         General: No tenderness or edema. Normal range of motion.      Cervical back: Normal range of motion and neck supple.   Neurological: He is alert and oriented to person, place, and time. He has normal strength. No cranial nerve deficit. GCS score is 15. GCS eye subscore is 4. GCS verbal subscore is 5. GCS motor subscore is 6.   Skin: Skin is warm and dry. No rash noted.   Psychiatric: He has a normal mood and affect. His behavior is normal.          Recent Results (from the past 24 hour(s))   POCT glucose    Collection Time: 10/12/22  7:41 AM   Result Value Ref Range    POCT Glucose 200 (H) 70 - 110 mg/dL   T4, Free    Collection Time: 10/12/22  8:22 AM   Result Value Ref Range    Free T4 1.29 0.71 - 1.51 ng/dL   TSH    Collection Time: 10/12/22  8:22 AM   Result Value Ref Range    TSH 0.438 0.400 - 4.000 uIU/mL   POCT glucose    Collection Time: 10/12/22  5:19 PM   Result Value Ref Range    POCT Glucose 211 (H) 70 - 110 mg/dL   POCT glucose    Collection Time: 10/12/22 11:54 PM   Result Value Ref Range    POCT Glucose 191 (H) 70 - 110 mg/dL   POCT glucose    Collection Time: 10/13/22  5:54 AM   Result Value Ref Range    POCT Glucose 157 (H) 70 - 110 mg/dL       Microbiology Results (last 7 days)       ** No results found for the last 168 hours. **             Imaging Results              US Abdomen Limited  (Final result)  Result time 10/11/22 23:37:33      Final result by Sierra Bell MD (10/11/22 23:37:33)                   Impression:      Limited examination.  Mild gallbladder sludge.  No cholelithiasis or evidence of acute cholecystitis.    Mild hepatic steatosis.      Electronically signed by: Sierra Bell  Date:    10/11/2022  Time:    23:37               Narrative:    EXAMINATION:  ULTRASOUND ABDOMEN LIMITED    CLINICAL HISTORY:  Epigastric pain for 2 days and much belching.    TECHNIQUE:  Limited ultrasound of the right upper quadrant of the abdomen (including pancreas, liver, gallbladder, common bile duct, and spleen) was performed.    COMPARISON:  None.    FINDINGS:  Liver: Normal in size, measuring 15 cm. Mild increase in echotexture.  No focal hepatic lesions.    Gallbladder: Small gallbladder sludge.  No calculi.  No wall thickening, or pericholecystic fluid.  No sonographic Mendez's sign.    Biliary system: The common duct is not dilated, measuring 3.2 mm.  No intrahepatic ductal dilatation.    Spleen: The spleen is normal in size, measuring 9.5 x 2.6 cm.    Miscellaneous: The pancreas is partially visualized by overlying bowel gas.                                       CT Abdomen Pelvis With Contrast (Final result)  Result time 10/11/22 21:23:06      Final result by Sierra Bell MD (10/11/22 21:23:06)                   Impression:      No acute abdominal or pelvic pathology CT of the abdomen and pelvis with contrast.      Electronically signed by: Sierra Bell  Date:    10/11/2022  Time:    21:23               Narrative:    EXAMINATION:  CT OF ABDOMEN PELVIS WITH    CLINICAL HISTORY:  Pancreatitis, acute, severe;    TECHNIQUE:  5 mm enhanced axial images were obtained from the lung bases through the greater trochanters.  A mL of Omnipaque 350 was injected.    COMPARISON:  None.    FINDINGS:  The liver, spleen, pancreas, kidneys, and adrenal glands are unremarkable. The gallbladder  contains calcified gallstones.    There is no definite evidence for abdominal adenopathy or ascites.    There are no pelvic masses or adenopathy.    There is no free fluid in the pelvis.    There is moderate bibasilar atelectasis.                                            Assessment/Plan:      * Cholelithiasis  Consult GI and general surgery  NPO  IVF  Pain control  CT abdomen/pelvis: The gallbladder contains calcified gallstones  US abdomen: Limited examination.  Mild gallbladder sludge.  No cholelithiasis or evidence of acute cholecystitis.  S/p cholecystectomy          Tachycardia  Unknown reason patient tachycardia, no fever or elevated WBCs...  IVF given in ED  Tele monitoring  EKG as needed       Low TSH level  On admission 0.290  Repeat level in 6 wks outpatient      Hyperglycemia  Patient's FSGs are uncontrolled due to hyperglycemia on current medication regimen.  Last A1c reviewed-   Lab Results   Component Value Date    HGBA1C 11.2 (H) 02/09/2022     Most recent fingerstick glucose reviewed-   Recent Labs   Lab 10/11/22  2025 10/11/22  2215 10/11/22  2309 10/12/22  0012   POCTGLUCOSE 173* 251* 236* 155*     Current correctional scale  Low  Maintain anti-hyperglycemic dose as follows-   Antihyperglycemics (From admission, onward)    Start     Stop Route Frequency Ordered    10/12/22 0430  insulin detemir U-100 pen 25 Units         -- SubQ Nightly 10/12/22 0324    10/12/22 0423  insulin aspart U-100 pen 0-5 Units         -- SubQ Every 6 hours PRN 10/12/22 0323        Hold Oral hypoglycemics while patient is in the hospital.    Nausea and vomiting  Antiemetics as needed       Essential hypertension  Patient likely elevated due to pain  Resume home losartan  Hydralazine as needed          VTE Risk Mitigation (From admission, onward)         Ordered     enoxaparin injection 40 mg  Daily         10/12/22 0341     IP VTE LOW RISK PATIENT  Once         10/12/22 0327     Place sequential compression device  Until  discontinued         10/12/22 0327                Discharge Planning   [place]: 10/13/2022     Code Status: Full Code   Is the patient medically ready for discharge?:     Reason for patient still in hospital (select all that apply): Patient trending condition and Treatment  Discharge Plan A: Home                  Lalo Obrien MD  Department of Hospital Medicine   [facility]

## 2022-10-13 NOTE — MEDICAL/APP STUDENT
[facility] [place]       Admit Date: 10/11/2022  Attending: Hermilo  S/P: Procedure(s) (LRB):  CHOLECYSTECTOMY, LAPAROSCOPIC (N/A)    Post-operative Day: 1 Day Post-Op    Hospital Day: 3    SUBJECTIVE:     HISTORY OF PRESENT ILLNESS:  Chadd Alonzo is a 42 y.o. male with PMH DM2, HTN who was initially admitted to [facility] on 10/11/2022 for symptomatic cholelithiasis. He reports a 4 week history of post-prandial epigastric abdominal pain, worse after spicy or fatty foods, associated with nausea and constipation. No recent fever or chills. No prior abdominal surgery. In ED, WBC normal and no transaminitis. Normal Tbili. CT A/P with cholelithiasis without cholecystitis. No ductal dilation. RUQ U/S however did not visualize cholelithiasis, or wall thickening/per-cholecystic fluid concerning for cholecystitis. General Surgery consulted for cholelithiasis.    INTERVAL HISTORY:  10/13/22: POD #1. No acute events overnight. Patient states he feels nauseated and that he has mild incisional pain. No nausea or vomiting. No bowel movements but passing flatus. Tolerating CLD. No other new complaints or concerns.    Scheduled Meds:   atorvastatin  40 mg Oral QHS    enoxaparin  40 mg Subcutaneous Daily    hydrALAZINE  50 mg Oral Q8H    insulin detemir U-100  25 Units Subcutaneous QHS    losartan  100 mg Oral Daily    pantoprazole  40 mg Intravenous BID    polyethylene glycol  17 g Oral Daily     Continuous Infusions:  PRN Meds:.dextrose 10%, dextrose 10%, dextrose 10%, fentaNYL, glucagon (human recombinant), glucagon (human recombinant), glucose, glucose, hydrALAZINE, HYDROmorphone, HYDROmorphone, insulin aspart U-100, naloxone, ondansetron, polyethylene glycol, sodium chloride 0.9%, sodium chloride 0.9%     OBJECTIVE:     Vital Signs (Most Recent)  Temp: 98.4 °F (36.9 °C) (10/13/22 0422)  Pulse: 104 (10/13/22 0422)  Resp: 20 (10/13/22 0422)  BP: (!) 154/79 (10/13/22 0422)  SpO2: 100 % (10/13/22  0422)    Vital Signs Range (Last 24H):  Temp:  [97.7 °F (36.5 °C)-99 °F (37.2 °C)]   Pulse:  [100-112]   Resp:  [15-20]   BP: (128-180)/(79-90)   SpO2:  [88 %-100 %]     I & O (Last 24H):  Intake/Output Summary (Last 24 hours) at 10/13/2022 0710  Last data filed at 10/13/2022 0551  Gross per 24 hour   Intake 1690 ml   Output 950 ml   Net 740 ml     Physical Exam  Constitutional:       Appearance: Normal appearance.   HENT:      Head: Normocephalic and atraumatic.   Cardiovascular:      Rate and Rhythm: Normal rate and regular rhythm.   Pulmonary:      Effort: Pulmonary effort is normal.   Abdominal:      Palpations: Abdomen is soft.      Tenderness: There is abdominal tenderness (mild incisional tenderness). There is no guarding.   Musculoskeletal:         General: Normal range of motion.   Neurological:      Mental Status: He is alert and oriented to person, place, and time.      Wound:  Clean, dry, intact    Laboratory:  CBC:   Recent Labs   Lab 10/12/22  0345   WBC 8.26   RBC 5.39   HGB 15.7   HCT 45.7      MCV 85   MCH 29.1   MCHC 34.4     CMP:   Recent Labs   Lab 10/12/22  0345   *   CALCIUM 8.9   ALBUMIN 4.2   PROT 7.5      K 4.3   CO2 18*      BUN 14   CREATININE 0.9   ALKPHOS 57   ALT 18   AST 11   BILITOT 0.5     Significant Diagnostics:  I have reviewed all pertinent imaging results/findings within the past 24 hours.    ASSESSMENT/PLAN:   JUSTINO GO 42 y.o.male s/p Procedure(s) (LRB):  CHOLECYSTECTOMY, LAPAROSCOPIC (N/A) 1 Day Post-Op    Cholelithiasis s/p cholecystectomy  -Advance to regular diet  -Pain/nausea prn  -Multimodal pain control  -Encourage ambulation and IS  -PT/OT    Dispo: Once tolerating diet and ambulating well.

## 2022-10-13 NOTE — PLAN OF CARE
[facility] - Med Surg  Discharge Final Note  TN sent a secure message to med surg nurse Cinthya that the patient is cleared for discharge from case management's viewpoint. TN reviewed d/c plan wit the patient and appts.  Primary Care Provider: [facility] [place]    Expected Discharge Date: 10/13/2022    Final Discharge Note (most recent)       Final Note - 10/13/22 0853          Final Note    Assessment Type Final Discharge Note     Anticipated Discharge Disposition Home or Self Care     What phone number can be called within the next 1-3 days to see how you are doing after discharge? --   see chart    Hospital Resources/Appts/Education Provided Appointments scheduled and added to AVS;Provided patient/caregiver with written discharge plan information        Post-Acute Status    Discharge Delays None known at this time                     Important Message from Medicare na             Contact Info       [facility] [place]   Relationship: PCP - General    [address]  [place] [address]   Phone: [phone]       Next Steps: Follow up    Anjel Akhtar MD   Specialty: General Surgery, Surgery    [address]  [address]  [place] [address]   Phone: [phone]       Next Steps: Follow up

## 2022-10-13 NOTE — ASSESSMENT & PLAN NOTE
POD#1 laparoscopic cholecystectomy. Clinically recovering, tolerating regular diet with well controlled pain. Incisions clean/dry.     - Regular diet  - PO pain, nausea meds prn  - Ok to discharge from hospital today. Will schedule outpatient follow-up in 2 weeks

## 2022-10-13 NOTE — PLAN OF CARE
Today's Date: 10/13/2022   Patient Name: Chadd Alonzo   YOB: 1980         Hospital Medicine Dept.    [facility] [address] [place] [address]  Phone: [phone]     Chadd Alonzo has been hospitalized at the [facility], admitted on 10/11/2022 and discharged on 10/13/2022. Patient may return this Monday the 17th with light duties only and may work without restrictions beginning Monday the 24th. Kindly excuse the patient from work duties during their hospital stay & recovery.     Please contact us, if you have any questions about the length of stay. Details of the patient's medical history will remain confidential, however.            ________________________  aLlo Obrien MD  Date Signed: 10/13/2022

## 2022-10-13 NOTE — PROGRESS NOTES
[facility]  General Surgery  Progress Note    Subjective:       Post-Op Info:  Procedure(s) (LRB):  CHOLECYSTECTOMY, LAPAROSCOPIC (N/A)   1 Day Post-Op     Interval History: No issues overnight. Pain well controlled. Tolerating a regular diet    Medications:  Continuous Infusions:  Scheduled Meds:   atorvastatin  40 mg Oral QHS    enoxaparin  40 mg Subcutaneous Daily    hydrALAZINE  50 mg Oral Q8H    insulin detemir U-100  25 Units Subcutaneous QHS    losartan  100 mg Oral Daily    ondansetron  4 mg Oral Once    pantoprazole  40 mg Intravenous BID    polyethylene glycol  17 g Oral Daily     PRN Meds:dextrose 10%, dextrose 10%, dextrose 10%, fentaNYL, glucagon (human recombinant), glucagon (human recombinant), glucose, glucose, hydrALAZINE, HYDROmorphone, HYDROmorphone, insulin aspart U-100, naloxone, ondansetron, polyethylene glycol, sodium chloride 0.9%, sodium chloride 0.9%     Review of patient's allergies indicates:  No Known Allergies  Objective:     Vital Signs (Most Recent):  Temp: 97.8 °F (36.6 °C) (10/13/22 0734)  Pulse: 104 (10/13/22 0734)  Resp: 19 (10/13/22 0734)  BP: 118/61 (10/13/22 0734)  SpO2: 98 % (10/13/22 0734) Vital Signs (24h Range):  Temp:  [97.7 °F (36.5 °C)-99 °F (37.2 °C)] 97.8 °F (36.6 °C)  Pulse:  [100-112] 104  Resp:  [15-20] 19  SpO2:  [88 %-100 %] 98 %  BP: (118-180)/(61-90) 118/61     Weight: 119.4 kg (263 lb 3.7 oz)  Body mass index is 38.87 kg/m².    Intake/Output - Last 3 Shifts         10/11 0700  10/12 0659 10/12 0700  10/13 0659 10/13 0700  10/14 0659    P.O.  240 240    IV Piggyback 1999 1450     Total Intake(mL/kg) 1999 (16.9) 1690 (14.2) 240 (2)    Urine (mL/kg/hr)  700 (0.2)     Stool  0     Blood  250     Total Output  950     Net +1999 +740 +240           Urine Occurrence  1 x     Stool Occurrence  0 x             Physical Exam  Vitals and nursing note reviewed.   Constitutional:       General: He is not in acute distress.  Cardiovascular:      Rate and  Rhythm: Normal rate.      Pulses: Normal pulses.   Pulmonary:      Effort: Pulmonary effort is normal. No respiratory distress.   Abdominal:      General: There is no distension.      Palpations: Abdomen is soft.      Tenderness: There is no abdominal tenderness.      Comments: Laparoscopic incisions clean/dry with steri-strips    Neurological:      Mental Status: He is alert.       Significant Labs:  I have reviewed all pertinent lab results within the past 24 hours.  CBC:   Recent Labs   Lab 10/12/22  0345   WBC 8.26   RBC 5.39   HGB 15.7   HCT 45.7      MCV 85   MCH 29.1   MCHC 34.4     BMP:   Recent Labs   Lab 10/12/22  0345   *      K 4.3      CO2 18*   BUN 14   CREATININE 0.9   CALCIUM 8.9   MG 1.9     CMP:   Recent Labs   Lab 10/12/22  0345   *   CALCIUM 8.9   ALBUMIN 4.2   PROT 7.5      K 4.3   CO2 18*      BUN 14   CREATININE 0.9   ALKPHOS 57   ALT 18   AST 11   BILITOT 0.5       Significant Diagnostics:  I have reviewed all pertinent imaging results/findings within the past 24 hours.    Assessment/Plan:     * Cholelithiasis  POD#1 laparoscopic cholecystectomy. Clinically recovering, tolerating regular diet with well controlled pain. Incisions clean/dry.     - Regular diet  - PO pain, nausea meds prn  - Ok to discharge from hospital today. Will schedule outpatient follow-up in 2 weeks        Ciarra Mathis MD  General Surgery  [facility]

## 2022-10-13 NOTE — ASSESSMENT & PLAN NOTE
Consult GI and general surgery  NPO  IVF  Pain control  CT abdomen/pelvis: The gallbladder contains calcified gallstones  US abdomen: Limited examination.  Mild gallbladder sludge.  No cholelithiasis or evidence of acute cholecystitis.  S/p cholecystectomy

## 2022-10-13 NOTE — HOSPITAL COURSE
Admitted with epigastric pain. No obstruction visualized. S/p cholecystectomy 10/12. Discharge today 10/13 if tolerating diet and pain managed.

## 2022-10-13 NOTE — DISCHARGE SUMMARY
[facility] Medicine  Discharge Summary      Patient Name: Chadd Alonzo  MRN: [id number]  Patient Class: OP- Observation  Admission Date: 10/11/2022  Hospital Length of Stay: 0 days  Discharge Date and Time:  10/13/2022 11:53 AM  Attending Physician: Lalo Obrien MD   Discharging Provider: Lalo Obrien MD  Primary Care Provider: [facility]      HPI:   42 year old male present to ED with c/o with abdominal pain, belching, nausea, vomiting x2 days.  Patient reports history of similar.  Denies fevers, chills.  Denies diarrhea.  Notes mild gas passage.  Denies hemoptysis, hematemesis. PMHx of DM  and HTN.          Procedure(s) (LRB):  CHOLECYSTECTOMY, LAPAROSCOPIC (N/A)      Hospital Course:   Admitted with epigastric pain. No obstruction visualized. S/p cholecystectomy 10/12. Discharge today 10/13 if tolerating diet and pain managed.       Pain meds and anti-nausea meds prescribed as needed.    Follow up with PCP and GenSx   Work excuse printed out          Lalo Obrien MD  Internal Medicine Staff  Goals of Care Treatment Preferences:  Code Status: Full Code      Consults:   Consults (From admission, onward)        Status Ordering Provider     Inpatient consult to Gastroenterology  Once        Provider:  Chyna Barraza MD    Completed LUIS ALBERTO CADET     Inpatient consult to General Surgery  Once        Provider:  Camden Avila MD    Completed LUIS ALBERTO CADET          No new Assessment & Plan notes have been filed under this hospital service since the last note was generated.  Service: Hospital Medicine    Final Active Diagnoses:    Diagnosis Date Noted POA    PRINCIPAL PROBLEM:  Cholelithiasis [K80.20] 10/12/2022 Yes    Hyperglycemia [R73.9] 10/12/2022 Yes    Low TSH level [R79.89] 10/12/2022 Yes    Tachycardia [R00.0] 10/12/2022 Yes    Nausea and vomiting [R11.2] 02/09/2022 Yes    Essential hypertension [I10] 02/07/2022 Yes      Problems Resolved During this  Admission:       Discharged Condition: good    Disposition: Home or Self Care    Follow Up:   Follow-up Information     [facility] Follow up.    Contact information:  [address] [facility]  [phone]             Anjel Akhtar MD Follow up.    Specialties: General Surgery, Surgery  Contact information:  [address] [facility]  [address]  [place] [address]  [phone]                       Patient Instructions:      Ambulatory referral/consult to General Surgery   Standing Status: Future   Referral Priority: Routine Referral Type: Consultation   Referral Reason: Specialty Services Required   Requested Specialty: General Surgery   Number of Visits Requested: 1     Ambulatory referral/consult to Internal Medicine   Standing Status: Future   Referral Priority: Routine Referral Type: Consultation   Referral Reason: Specialty Services Required   Requested Specialty: Internal Medicine   Number of Visits Requested: 1       Significant Diagnostic Studies:     Recent Results (from the past 100 hour(s))   Comp. Metabolic Panel    Collection Time: 10/11/22  7:41 PM   Result Value Ref Range    Sodium 142 136 - 145 mmol/L    Potassium 4.2 3.5 - 5.1 mmol/L    Chloride 107 95 - 110 mmol/L    CO2 19 (L) 23 - 29 mmol/L    Glucose 247 (H) 70 - 110 mg/dL    BUN 17 6 - 20 mg/dL    Creatinine 1.1 0.5 - 1.4 mg/dL    Calcium 9.3 8.7 - 10.5 mg/dL    Total Protein 7.8 6.0 - 8.4 g/dL    Albumin 4.4 3.5 - 5.2 g/dL    Total Bilirubin 0.6 0.1 - 1.0 mg/dL    Alkaline Phosphatase 55 55 - 135 U/L    AST 14 10 - 40 U/L    ALT 23 10 - 44 U/L    Anion Gap 16 8 - 16 mmol/L    eGFR >60 >60 mL/min/1.73 m^2   Lipase    Collection Time: 10/11/22  7:41 PM   Result Value Ref Range    Lipase 13 4 - 60 U/L   Troponin I    Collection Time: 10/11/22  7:41 PM   Result Value Ref Range    Troponin I <0.006 0.000 - 0.026 ng/mL   ISTAT PROCEDURE    Collection Time: 10/11/22  8:10 PM   Result Value Ref Range    POC PH 7.401 7.35 - 7.45     POC PCO2 21.9 (L) 35 - 45 mmHg    POC PO2 36 (L) 40 - 60 mmHg    POC HCO3 13.6 (L) 24 - 28 mmol/L    POC BE -9 -2 to 2 mmol/L    POC SATURATED O2 72 (L) 95 - 100 %    POC TCO2 14 (L) 24 - 29 mmol/L    Sample VENOUS     Site [facility]     Allens Test N/A     DelSys Room Air    CBC W/ AUTO DIFFERENTIAL    Collection Time: 10/11/22  8:22 PM   Result Value Ref Range    WBC 5.47 3.90 - 12.70 K/uL    RBC 4.11 (L) 4.60 - 6.20 M/uL    Hemoglobin 12.0 (L) 14.0 - 18.0 g/dL    Hematocrit 35.6 (L) 40.0 - 54.0 %    MCV 87 82 - 98 fL    MCH 29.2 27.0 - 31.0 pg    MCHC 33.7 32.0 - 36.0 g/dL    RDW 13.1 11.5 - 14.5 %    Platelets 219 150 - 450 K/uL    MPV 9.2 9.2 - 12.9 fL    Immature Granulocytes 0.2 0.0 - 0.5 %    Gran # (ANC) 4.1 1.8 - 7.7 K/uL    Immature Grans (Abs) 0.01 0.00 - 0.04 K/uL    Lymph # 0.9 (L) 1.0 - 4.8 K/uL    Mono # 0.5 0.3 - 1.0 K/uL    Eos # 0.0 0.0 - 0.5 K/uL    Baso # 0.02 0.00 - 0.20 K/uL    nRBC 0 0 /100 WBC    Gran % 75.1 (H) 38.0 - 73.0 %    Lymph % 16.1 (L) 18.0 - 48.0 %    Mono % 8.2 4.0 - 15.0 %    Eosinophil % 0.0 0.0 - 8.0 %    Basophil % 0.4 0.0 - 1.9 %    Differential Method Automated    D dimer, quantitative    Collection Time: 10/11/22  8:22 PM   Result Value Ref Range    D-Dimer <0.19 <0.50 mg/L FEU   Lactic acid, plasma    Collection Time: 10/11/22  8:22 PM   Result Value Ref Range    Lactate (Lactic Acid) 1.0 0.5 - 2.2 mmol/L   Beta - Hydroxybutyrate, Serum    Collection Time: 10/11/22  8:22 PM   Result Value Ref Range    Beta-Hydroxybutyrate 3.4 (H) 0.0 - 0.5 mmol/L   POCT glucose    Collection Time: 10/11/22  8:25 PM   Result Value Ref Range    POCT Glucose 173 (H) 70 - 110 mg/dL   Urinalysis, Reflex to Urine Culture Urine, Clean Catch    Collection Time: 10/11/22  9:38 PM    Specimen: Urine   Result Value Ref Range    Specimen UA Urine, Clean Catch     Color, UA Yellow Yellow, Straw, Meliza    Appearance, UA Clear Clear    pH, UA 6.0 5.0 - 8.0    Specific Gravity, UA >1.030 (A) 1.005 -  1.030    Protein, UA 1+ (A) Negative    Glucose, UA 4+ (A) Negative    Ketones, UA 3+ (A) Negative    Bilirubin (UA) Negative Negative    Occult Blood UA Negative Negative    Nitrite, UA Negative Negative    Urobilinogen, UA Negative <2.0 EU/dL    Leukocytes, UA Negative Negative   Urinalysis Microscopic    Collection Time: 10/11/22  9:38 PM   Result Value Ref Range    RBC, UA 2 0 - 4 /hpf    WBC, UA 5 0 - 5 /hpf    Bacteria Occasional None-Occ /hpf    Yeast, UA None None    Squam Epithel, UA 2 /hpf    Hyaline Casts, UA 4 (A) 0-1/lpf /lpf    Microscopic Comment SEE COMMENT    POCT glucose    Collection Time: 10/11/22 10:15 PM   Result Value Ref Range    POCT Glucose 251 (H) 70 - 110 mg/dL   POCT glucose    Collection Time: 10/11/22 11:09 PM   Result Value Ref Range    POCT Glucose 236 (H) 70 - 110 mg/dL   Ethanol    Collection Time: 10/11/22 11:11 PM   Result Value Ref Range    Alcohol, Serum <10 <10 mg/dL   Lactic acid, plasma    Collection Time: 10/11/22 11:11 PM   Result Value Ref Range    Lactate (Lactic Acid) 1.4 0.5 - 2.2 mmol/L   POCT glucose    Collection Time: 10/12/22 12:12 AM   Result Value Ref Range    POCT Glucose 155 (H) 70 - 110 mg/dL   Beta - Hydroxybutyrate, Serum    Collection Time: 10/12/22 12:13 AM   Result Value Ref Range    Beta-Hydroxybutyrate 2.4 (H) 0.0 - 0.5 mmol/L   Basic metabolic panel    Collection Time: 10/12/22 12:13 AM   Result Value Ref Range    Sodium SEE COMMENT 136 - 145 mmol/L    Potassium SEE COMMENT 3.5 - 5.1 mmol/L    Chloride SEE COMMENT 95 - 110 mmol/L    CO2 SEE COMMENT 23 - 29 mmol/L    Glucose SEE COMMENT 70 - 110 mg/dL    BUN SEE COMMENT 6 - 20 mg/dL    Creatinine SEE COMMENT 0.5 - 1.4 mg/dL    Calcium SEE COMMENT 8.7 - 10.5 mg/dL    Anion Gap SEE COMMENT 8 - 16 mmol/L    eGFR SEE COMMENT >60 mL/min/1.73 m^2    eGFR if  SEE COMMENT >60 mL/min/1.73 m^2    eGFR if non  SEE COMMENT >60 mL/min/1.73 m^2   TSH    Collection Time: 10/12/22 12:13  AM   Result Value Ref Range    TSH SEE COMMENT 0.400 - 4.000 uIU/mL   T4, Free    Collection Time: 10/12/22 12:13 AM   Result Value Ref Range    Free T4 SEE COMMENT 0.71 - 1.51 ng/dL   Drug screen panel, emergency    Collection Time: 10/12/22 12:52 AM   Result Value Ref Range    Benzodiazepines Negative Negative    Methadone metabolites Negative Negative    Cocaine (Metab.) Negative Negative    Opiate Scrn, Ur Presumptive Positive (A) Negative    Barbiturate Screen, Ur Negative Negative    Amphetamine Screen, Ur Negative Negative    THC Negative Negative    Phencyclidine Negative Negative    Creatinine, Urine 176.6 23.0 - 375.0 mg/dL    Toxicology Information SEE COMMENT    Basic metabolic panel    Collection Time: 10/12/22  1:05 AM   Result Value Ref Range    Sodium SEE COMMENT 136 - 145 mmol/L    Potassium SEE COMMENT 3.5 - 5.1 mmol/L    Chloride SEE COMMENT 95 - 110 mmol/L    CO2 SEE COMMENT 23 - 29 mmol/L    Glucose SEE COMMENT 70 - 110 mg/dL    BUN SEE COMMENT 6 - 20 mg/dL    Creatinine SEE COMMENT 0.5 - 1.4 mg/dL    Calcium SEE COMMENT 8.7 - 10.5 mg/dL    Anion Gap SEE COMMENT 8 - 16 mmol/L    eGFR SEE COMMENT >60 mL/min/1.73 m^2    eGFR if  SEE COMMENT >60 mL/min/1.73 m^2    eGFR if non  SEE COMMENT >60 mL/min/1.73 m^2   Magnesium    Collection Time: 10/12/22  1:05 AM   Result Value Ref Range    Magnesium SEE COMMENT 1.6 - 2.6 mg/dL   Phosphorus    Collection Time: 10/12/22  1:05 AM   Result Value Ref Range    Phosphorus SEE COMMENT 2.7 - 4.5 mg/dL   ISTAT PROCEDURE    Collection Time: 10/12/22  1:55 AM   Result Value Ref Range    POC Glucose 226 (H) 70 - 110 mg/dL    POC BUN 15 6 - 30 mg/dL    POC Creatinine 0.7 0.5 - 1.4 mg/dL    POC Sodium 141 136 - 145 mmol/L    POC Potassium 4.2 3.5 - 5.1 mmol/L    POC Chloride 105 95 - 110 mmol/L    POC TCO2 (MEASURED) 21 (L) 23 - 29 mmol/L    POC Anion Gap 20 (H) 8 - 16 mmol/L    POC Ionized Calcium 1.22 1.06 - 1.42 mmol/L    POC  Hematocrit 47 36 - 54 %PCV    Sample VENOUS    CBC auto differential    Collection Time: 10/12/22  3:45 AM   Result Value Ref Range    WBC 8.26 3.90 - 12.70 K/uL    RBC 5.39 4.60 - 6.20 M/uL    Hemoglobin 15.7 14.0 - 18.0 g/dL    Hematocrit 45.7 40.0 - 54.0 %    MCV 85 82 - 98 fL    MCH 29.1 27.0 - 31.0 pg    MCHC 34.4 32.0 - 36.0 g/dL    RDW 13.1 11.5 - 14.5 %    Platelets 280 150 - 450 K/uL    MPV 8.8 (L) 9.2 - 12.9 fL    Immature Granulocytes 0.2 0.0 - 0.5 %    Gran # (ANC) 7.2 1.8 - 7.7 K/uL    Immature Grans (Abs) 0.02 0.00 - 0.04 K/uL    Lymph # 0.7 (L) 1.0 - 4.8 K/uL    Mono # 0.3 0.3 - 1.0 K/uL    Eos # 0.0 0.0 - 0.5 K/uL    Baso # 0.01 0.00 - 0.20 K/uL    nRBC 0 0 /100 WBC    Gran % 87.6 (H) 38.0 - 73.0 %    Lymph % 8.1 (L) 18.0 - 48.0 %    Mono % 4.0 4.0 - 15.0 %    Eosinophil % 0.0 0.0 - 8.0 %    Basophil % 0.1 0.0 - 1.9 %    Differential Method Automated    Comprehensive metabolic panel    Collection Time: 10/12/22  3:45 AM   Result Value Ref Range    Sodium 139 136 - 145 mmol/L    Potassium 4.3 3.5 - 5.1 mmol/L    Chloride 110 95 - 110 mmol/L    CO2 18 (L) 23 - 29 mmol/L    Glucose 227 (H) 70 - 110 mg/dL    BUN 14 6 - 20 mg/dL    Creatinine 0.9 0.5 - 1.4 mg/dL    Calcium 8.9 8.7 - 10.5 mg/dL    Total Protein 7.5 6.0 - 8.4 g/dL    Albumin 4.2 3.5 - 5.2 g/dL    Total Bilirubin 0.5 0.1 - 1.0 mg/dL    Alkaline Phosphatase 57 55 - 135 U/L    AST 11 10 - 40 U/L    ALT 18 10 - 44 U/L    Anion Gap 11 8 - 16 mmol/L    eGFR >60 >60 mL/min/1.73 m^2   Magnesium    Collection Time: 10/12/22  3:45 AM   Result Value Ref Range    Magnesium 1.9 1.6 - 2.6 mg/dL   Phosphorus    Collection Time: 10/12/22  3:45 AM   Result Value Ref Range    Phosphorus 2.9 2.7 - 4.5 mg/dL   Beta - Hydroxybutyrate, Serum    Collection Time: 10/12/22  3:45 AM   Result Value Ref Range    Beta-Hydroxybutyrate 2.1 (H) 0.0 - 0.5 mmol/L   ISTAT PROCEDURE    Collection Time: 10/12/22  3:49 AM   Result Value Ref Range    POC PH 7.387 7.35 - 7.45     POC PCO2 30.6 (L) 35 - 45 mmHg    POC PO2 61 (HH) 40 - 60 mmHg    POC HCO3 18.4 (L) 24 - 28 mmol/L    POC BE -5 -2 to 2 mmol/L    POC SATURATED O2 91 (L) 95 - 100 %    POC TCO2 19 (L) 24 - 29 mmol/L    Sample VENOUS     Site [facility]     Allens Test N/A     DelSys Room Air    POCT glucose    Collection Time: 10/12/22  3:50 AM   Result Value Ref Range    POCT Glucose 187 (H) 70 - 110 mg/dL   POCT glucose    Collection Time: 10/12/22  7:41 AM   Result Value Ref Range    POCT Glucose 200 (H) 70 - 110 mg/dL   T4, Free    Collection Time: 10/12/22  8:22 AM   Result Value Ref Range    Free T4 1.29 0.71 - 1.51 ng/dL   TSH    Collection Time: 10/12/22  8:22 AM   Result Value Ref Range    TSH 0.438 0.400 - 4.000 uIU/mL   POCT glucose    Collection Time: 10/12/22  5:19 PM   Result Value Ref Range    POCT Glucose 211 (H) 70 - 110 mg/dL   POCT glucose    Collection Time: 10/12/22 11:54 PM   Result Value Ref Range    POCT Glucose 191 (H) 70 - 110 mg/dL   POCT glucose    Collection Time: 10/13/22  5:54 AM   Result Value Ref Range    POCT Glucose 157 (H) 70 - 110 mg/dL   CBC auto differential    Collection Time: 10/13/22 10:59 AM   Result Value Ref Range    WBC 9.97 3.90 - 12.70 K/uL    RBC 4.90 4.60 - 6.20 M/uL    Hemoglobin 14.2 14.0 - 18.0 g/dL    Hematocrit 41.0 40.0 - 54.0 %    MCV 84 82 - 98 fL    MCH 29.0 27.0 - 31.0 pg    MCHC 34.6 32.0 - 36.0 g/dL    RDW 12.8 11.5 - 14.5 %    Platelets 251 150 - 450 K/uL    MPV 9.1 (L) 9.2 - 12.9 fL    Immature Granulocytes 0.2 0.0 - 0.5 %    Gran # (ANC) 6.4 1.8 - 7.7 K/uL    Immature Grans (Abs) 0.02 0.00 - 0.04 K/uL    Lymph # 2.4 1.0 - 4.8 K/uL    Mono # 1.1 (H) 0.3 - 1.0 K/uL    Eos # 0.0 0.0 - 0.5 K/uL    Baso # 0.03 0.00 - 0.20 K/uL    nRBC 0 0 /100 WBC    Gran % 64.6 38.0 - 73.0 %    Lymph % 23.7 18.0 - 48.0 %    Mono % 10.9 4.0 - 15.0 %    Eosinophil % 0.3 0.0 - 8.0 %    Basophil % 0.3 0.0 - 1.9 %    Differential Method Automated    POCT glucose    Collection Time: 10/13/22  11:46 AM   Result Value Ref Range    POCT Glucose 180 (H) 70 - 110 mg/dL       Microbiology Results (last 7 days)     ** No results found for the last 168 hours. **          Imaging Results          US Abdomen Limited (Final result)  Result time 10/11/22 23:37:33    Final result by Sierra Bell MD (10/11/22 23:37:33)                 Impression:      Limited examination.  Mild gallbladder sludge.  No cholelithiasis or evidence of acute cholecystitis.    Mild hepatic steatosis.      Electronically signed by: Sierra Bell  Date:    10/11/2022  Time:    23:37             Narrative:    EXAMINATION:  ULTRASOUND ABDOMEN LIMITED    CLINICAL HISTORY:  Epigastric pain for 2 days and much belching.    TECHNIQUE:  Limited ultrasound of the right upper quadrant of the abdomen (including pancreas, liver, gallbladder, common bile duct, and spleen) was performed.    COMPARISON:  None.    FINDINGS:  Liver: Normal in size, measuring 15 cm. Mild increase in echotexture.  No focal hepatic lesions.    Gallbladder: Small gallbladder sludge.  No calculi.  No wall thickening, or pericholecystic fluid.  No sonographic Mendez's sign.    Biliary system: The common duct is not dilated, measuring 3.2 mm.  No intrahepatic ductal dilatation.    Spleen: The spleen is normal in size, measuring 9.5 x 2.6 cm.    Miscellaneous: The pancreas is partially visualized by overlying bowel gas.                               CT Abdomen Pelvis With Contrast (Final result)  Result time 10/11/22 21:23:06    Final result by Sierra Bell MD (10/11/22 21:23:06)                 Impression:      No acute abdominal or pelvic pathology CT of the abdomen and pelvis with contrast.      Electronically signed by: Sierra Bell  Date:    10/11/2022  Time:    21:23             Narrative:    EXAMINATION:  CT OF ABDOMEN PELVIS WITH    CLINICAL HISTORY:  Pancreatitis, acute, severe;    TECHNIQUE:  5 mm enhanced axial images were obtained from the lung bases  through the greater trochanters.  A mL of Omnipaque 350 was injected.    COMPARISON:  None.    FINDINGS:  The liver, spleen, pancreas, kidneys, and adrenal glands are unremarkable. The gallbladder contains calcified gallstones.    There is no definite evidence for abdominal adenopathy or ascites.    There are no pelvic masses or adenopathy.    There is no free fluid in the pelvis.    There is moderate bibasilar atelectasis.                                      Pending Diagnostic Studies:     Procedure Component Value Units Date/Time    Specimen to Pathology, Surgery General Surgery [[id number]] Collected: 10/12/22 1503    Order Status: Sent Lab Status: In process Updated: 10/12/22 1513    Specimen: Tissue          Medications:  Reconciled Home Medications:      Medication List      START taking these medications    HYDROcodone-acetaminophen 5-325 mg per tablet  Commonly known as: NORCO  Take 1 tablet by mouth every 6 (six) hours as needed for Pain.     ondansetron 8 MG tablet  Commonly known as: ZOFRAN  Take 1 tablet (8 mg total) by mouth every 8 (eight) hours as needed for Nausea.        CONTINUE taking these medications    acetaminophen 500 MG tablet  Commonly known as: TYLENOL  Take 1 tablet (500 mg total) by mouth every 6 (six) hours as needed for Pain (As needed for pain and fever).     amlodipine-valsartan 5-320 mg per tablet  Commonly known as: EXFORGE  Take 1 tablet by mouth once daily.     blood sugar diagnostic Strp  1 strip by Misc.(Non-Drug; Combo Route) route 2 (two) times daily with meals.     blood-glucose meter kit  Use as instructed     clotrimazole 1 % cream  Commonly known as: LOTRIMIN  Apply topically 2 (two) times daily.     gabapentin 100 MG capsule  Commonly known as: NEURONTIN  Take 1 capsule (100 mg total) by mouth every evening.     ibuprofen 600 MG tablet  Commonly known as: ADVIL,MOTRIN  Take 1 tablet (600 mg total) by mouth every 6 (six) hours as needed for Pain (Take with food as needed  "for mild-to-moderate pain).     insulin aspart U-100 100 unit/mL (3 mL) Inpn pen  Commonly known as: NovoLOG  Inject 8 Units into the skin 3 (three) times daily.     insulin detemir U-100 100 unit/mL (3 mL) Inpn pen  Commonly known as: Levemir FLEXTOUCH  Inject 25 Units into the skin once daily.     insulin syringe-needle U-100 1 mL 31 gauge x 15/64" Syrg  1 Syringe by Misc.(Non-Drug; Combo Route) route 3 (three) times daily with meals.     lancets Misc  1 lancet by Misc.(Non-Drug; Combo Route) route 2 (two) times daily with meals.     lancing device Misc  1 Device by Misc.(Non-Drug; Combo Route) route 2 (two) times daily with meals.     LIDOcaine HCl 2% 2 % Soln  Commonly known as: XYLOCAINE  by Mucous Membrane route every 3 (three) hours as needed (As needed for sore throat). Apply 1 tsp to painful area every 3 hr as needed for pain     pantoprazole 40 MG tablet  Commonly known as: PROTONIX  Take 1 tablet (40 mg total) by mouth once daily.     pen needle, diabetic 31 gauge x 5/16" Ndle  1 each by Misc.(Non-Drug; Combo Route) route 2 (two) times daily with meals.     rosuvastatin 10 MG tablet  Commonly known as: CRESTOR  Take 10 mg by mouth once daily.     sildenafiL 100 MG tablet  Commonly known as: VIAGRA  Take 1 tablet (100 mg total) by mouth daily as needed for Erectile Dysfunction.     tadalafiL 20 MG Tab  Commonly known as: CIALIS  Take 1 tablet (20 mg total) by mouth Every 3 (three) days. for 10 days     TRULICITY 0.75 mg/0.5 mL pen injector  Generic drug: dulaglutide  Inject 0.75 mg into the skin every 7 days.     XIGDUO XR 2.5-1,000 mg Tbph  Generic drug: dapagliflozin-metformin  Take by mouth.        STOP taking these medications    losartan 100 MG tablet  Commonly known as: COZAAR     valsartan 320 MG tablet  Commonly known as: DIOVAN            Indwelling Lines/Drains at time of discharge:   Lines/Drains/Airways     None                 Time spent on the discharge of patient: 35 minutes         Lalo A " MD [place]  Department of Hospital Medicine  [facility]

## 2022-10-13 NOTE — SUBJECTIVE & OBJECTIVE
NAEON. Denies SOB or CP.   UOP wnl.     Review of Systems  Constitutional:  Negative for chills and fever.   HENT:  Negative for sore throat.    Respiratory:  Negative for shortness of breath.    Cardiovascular:  Negative for chest pain.   Gastrointestinal:  Positive for abdominal pain, nausea   Genitourinary:  Negative for dysuria.   Musculoskeletal:  Negative for back pain.   Skin:  Negative for rash.   Neurological:  Negative for weakness.   Psychiatric/Behavioral:  Negative for confusion.        Objective:     Vital Signs (Most Recent):  Temp: 98.4 °F (36.9 °C) (10/13/22 0422)  Pulse: 104 (10/13/22 0422)  Resp: 20 (10/13/22 0422)  BP: (!) 154/79 (10/13/22 0422)  SpO2: 100 % (10/13/22 0422)   Vital Signs (24h Range):  Temp:  [97.7 °F (36.5 °C)-99 °F (37.2 °C)] 98.4 °F (36.9 °C)  Pulse:  [100-112] 104  Resp:  [15-20] 20  SpO2:  [88 %-100 %] 100 %  BP: (128-180)/(79-90) 154/79     Weight: 119.4 kg (263 lb 3.7 oz)  Body mass index is 38.87 kg/m².    Physical Exam   Nursing note and vitals reviewed.  Constitutional: He appears well-developed and well-nourished. He is not diaphoretic. No distress.   HENT:   Head: Normocephalic and atraumatic.   Eyes: Conjunctivae and EOM are normal. Pupils are equal, round, and reactive to light. No scleral icterus.   Neck: Neck supple.   Normal range of motion.  Cardiovascular:  Normal rate, regular rhythm, normal heart sounds and intact distal pulses.     Exam reveals no gallop and no friction rub.       No murmur heard.  Pulmonary/Chest: Breath sounds normal. No stridor. No respiratory distress. He has no wheezes. He has no rhonchi. He has no rales.   Abdominal: Abdomen is soft. Bowel sounds are normal. He exhibits no distension. There is abdominal tenderness. There is no rebound and no guarding.   Musculoskeletal:         General: No tenderness or edema. Normal range of motion.      Cervical back: Normal range of motion and neck supple.   Neurological: He is alert and oriented to  person, place, and time. He has normal strength. No cranial nerve deficit. GCS score is 15. GCS eye subscore is 4. GCS verbal subscore is 5. GCS motor subscore is 6.   Skin: Skin is warm and dry. No rash noted.   Psychiatric: He has a normal mood and affect. His behavior is normal.          Recent Results (from the past 24 hour(s))   POCT glucose    Collection Time: 10/12/22  7:41 AM   Result Value Ref Range    POCT Glucose 200 (H) 70 - 110 mg/dL   T4, Free    Collection Time: 10/12/22  8:22 AM   Result Value Ref Range    Free T4 1.29 0.71 - 1.51 ng/dL   TSH    Collection Time: 10/12/22  8:22 AM   Result Value Ref Range    TSH 0.438 0.400 - 4.000 uIU/mL   POCT glucose    Collection Time: 10/12/22  5:19 PM   Result Value Ref Range    POCT Glucose 211 (H) 70 - 110 mg/dL   POCT glucose    Collection Time: 10/12/22 11:54 PM   Result Value Ref Range    POCT Glucose 191 (H) 70 - 110 mg/dL   POCT glucose    Collection Time: 10/13/22  5:54 AM   Result Value Ref Range    POCT Glucose 157 (H) 70 - 110 mg/dL       Microbiology Results (last 7 days)       ** No results found for the last 168 hours. **             Imaging Results              US Abdomen Limited (Final result)  Result time 10/11/22 23:37:33      Final result by Sierra Bell MD (10/11/22 23:37:33)                   Impression:      Limited examination.  Mild gallbladder sludge.  No cholelithiasis or evidence of acute cholecystitis.    Mild hepatic steatosis.      Electronically signed by: Sierra Bell  Date:    10/11/2022  Time:    23:37               Narrative:    EXAMINATION:  ULTRASOUND ABDOMEN LIMITED    CLINICAL HISTORY:  Epigastric pain for 2 days and much belching.    TECHNIQUE:  Limited ultrasound of the right upper quadrant of the abdomen (including pancreas, liver, gallbladder, common bile duct, and spleen) was performed.    COMPARISON:  None.    FINDINGS:  Liver: Normal in size, measuring 15 cm. Mild increase in echotexture.  No focal hepatic  lesions.    Gallbladder: Small gallbladder sludge.  No calculi.  No wall thickening, or pericholecystic fluid.  No sonographic Mendez's sign.    Biliary system: The common duct is not dilated, measuring 3.2 mm.  No intrahepatic ductal dilatation.    Spleen: The spleen is normal in size, measuring 9.5 x 2.6 cm.    Miscellaneous: The pancreas is partially visualized by overlying bowel gas.                                       CT Abdomen Pelvis With Contrast (Final result)  Result time 10/11/22 21:23:06      Final result by Sierra Bell MD (10/11/22 21:23:06)                   Impression:      No acute abdominal or pelvic pathology CT of the abdomen and pelvis with contrast.      Electronically signed by: Sierra Bell  Date:    10/11/2022  Time:    21:23               Narrative:    EXAMINATION:  CT OF ABDOMEN PELVIS WITH    CLINICAL HISTORY:  Pancreatitis, acute, severe;    TECHNIQUE:  5 mm enhanced axial images were obtained from the lung bases through the greater trochanters.  A mL of Omnipaque 350 was injected.    COMPARISON:  None.    FINDINGS:  The liver, spleen, pancreas, kidneys, and adrenal glands are unremarkable. The gallbladder contains calcified gallstones.    There is no definite evidence for abdominal adenopathy or ascites.    There are no pelvic masses or adenopathy.    There is no free fluid in the pelvis.    There is moderate bibasilar atelectasis.

## 2022-10-13 NOTE — DISCHARGE INSTRUCTIONS
Pain meds and anti-nausea meds prescribed as needed.   Follow up with PCP and [facility]  Work excuse printed out          Lalo Obrien MD  Internal Medicine Staff

## 2022-10-16 NOTE — ANESTHESIA POSTPROCEDURE EVALUATION
Anesthesia Post Evaluation    Patient: Chadd Alonzo    Procedure(s) Performed: Procedure(s) (LRB):  CHOLECYSTECTOMY, LAPAROSCOPIC (N/A)    Final Anesthesia Type: general      Patient location during evaluation: PACU  Patient participation: Yes- Able to Participate  Level of consciousness: awake and alert  Post-procedure vital signs: reviewed and stable  Pain management: adequate  Airway patency: patent    PONV status at discharge: No PONV  Anesthetic complications: no      Cardiovascular status: blood pressure returned to baseline and hemodynamically stable  Respiratory status: unassisted and spontaneous ventilation  Hydration status: euvolemic  Follow-up not needed.          Vitals Value Taken Time   /90 10/13/22 1145   Temp 36.8 °C (98.2 °F) 10/13/22 1145   Pulse 118 10/13/22 1145   Resp 19 10/13/22 1150   SpO2 99 % 10/13/22 1145         Event Time   Out of Recovery 10/12/2022 16:37:02         Pain/Aileen Score: No data recorded

## 2022-10-18 ENCOUNTER — TELEPHONE (OUTPATIENT)
Dept: SURGERY | Facility: CLINIC | Age: 42
End: 2022-10-18
Payer: MEDICAID

## 2022-10-18 LAB
FINAL PATHOLOGIC DIAGNOSIS: NORMAL
GROSS: NORMAL
Lab: NORMAL

## 2022-10-18 NOTE — TELEPHONE ENCOUNTER
Spoke with  regarding msg, informed him of the fax number to send disability forms, pt verbalized understanding.     ----- Message from Niraj Duckworth sent at 10/18/2022  9:22 AM CDT -----  Type: Patient Call Back    Who called: Charli Peña/ [place]     What is the request in detail: PT is asking for extended time off from work because he have soreness pain every time he get up even though he was put on light duty he still suffers from Pain. Also have questions about his short term disability and how to go about it.     Can the clinic reply by [facility]? No     Would the patient rather a call back or a response via [facility]? Call     Best call back number: [phone] or [phone]

## 2022-10-27 ENCOUNTER — OFFICE VISIT (OUTPATIENT)
Dept: SURGERY | Facility: CLINIC | Age: 42
End: 2022-10-27
Payer: MEDICAID

## 2022-10-27 VITALS
SYSTOLIC BLOOD PRESSURE: 130 MMHG | HEIGHT: 69 IN | HEART RATE: 116 BPM | OXYGEN SATURATION: 96 % | BODY MASS INDEX: 39.04 KG/M2 | WEIGHT: 263.56 LBS | DIASTOLIC BLOOD PRESSURE: 90 MMHG

## 2022-10-27 DIAGNOSIS — I10 ESSENTIAL HYPERTENSION: ICD-10-CM

## 2022-10-27 DIAGNOSIS — K80.20 CHOLELITHIASIS: Primary | ICD-10-CM

## 2022-10-27 DIAGNOSIS — E11.10 DIABETIC KETOACIDOSIS WITHOUT COMA ASSOCIATED WITH TYPE 2 DIABETES MELLITUS: ICD-10-CM

## 2022-10-27 PROCEDURE — 3080F PR MOST RECENT DIASTOLIC BLOOD PRESSURE >= 90 MM HG: ICD-10-PCS | Mod: CPTII,,, | Performed by: SURGERY

## 2022-10-27 PROCEDURE — 99024 POSTOP FOLLOW-UP VISIT: CPT | Mod: ,,, | Performed by: SURGERY

## 2022-10-27 PROCEDURE — 4010F PR ACE/ARB THEARPY RXD/TAKEN: ICD-10-PCS | Mod: CPTII,,, | Performed by: SURGERY

## 2022-10-27 PROCEDURE — 99024 PR POST-OP FOLLOW-UP VISIT: ICD-10-PCS | Mod: ,,, | Performed by: SURGERY

## 2022-10-27 PROCEDURE — 1159F MED LIST DOCD IN RCRD: CPT | Mod: CPTII,,, | Performed by: SURGERY

## 2022-10-27 PROCEDURE — 3080F DIAST BP >= 90 MM HG: CPT | Mod: CPTII,,, | Performed by: SURGERY

## 2022-10-27 PROCEDURE — 4010F ACE/ARB THERAPY RXD/TAKEN: CPT | Mod: CPTII,,, | Performed by: SURGERY

## 2022-10-27 PROCEDURE — 3075F SYST BP GE 130 - 139MM HG: CPT | Mod: CPTII,,, | Performed by: SURGERY

## 2022-10-27 PROCEDURE — 99999 PR PBB SHADOW E&M-EST. PATIENT-LVL IV: ICD-10-PCS | Mod: PBBFAC,,, | Performed by: SURGERY

## 2022-10-27 PROCEDURE — 99214 OFFICE O/P EST MOD 30 MIN: CPT | Mod: PBBFAC | Performed by: SURGERY

## 2022-10-27 PROCEDURE — 3046F PR MOST RECENT HEMOGLOBIN A1C LEVEL > 9.0%: ICD-10-PCS | Mod: CPTII,,, | Performed by: SURGERY

## 2022-10-27 PROCEDURE — 99999 PR PBB SHADOW E&M-EST. PATIENT-LVL IV: CPT | Mod: PBBFAC,,, | Performed by: SURGERY

## 2022-10-27 PROCEDURE — 3046F HEMOGLOBIN A1C LEVEL >9.0%: CPT | Mod: CPTII,,, | Performed by: SURGERY

## 2022-10-27 PROCEDURE — 1159F PR MEDICATION LIST DOCUMENTED IN MEDICAL RECORD: ICD-10-PCS | Mod: CPTII,,, | Performed by: SURGERY

## 2022-10-27 PROCEDURE — 3075F PR MOST RECENT SYSTOLIC BLOOD PRESS GE 130-139MM HG: ICD-10-PCS | Mod: CPTII,,, | Performed by: SURGERY

## 2022-10-27 NOTE — PROGRESS NOTES
42. y/o man with history of lap celso, now about 2 weeks out.  No complaints this am, reports feeling well.    PE: incision c/d/i no evidence of infection or hernia    Impression: post op, doing well    Plan: gradually resume normal activity, normal diet, and follow up as needed, no straining for 4 weeks from surgery.

## 2022-10-31 ENCOUNTER — TELEPHONE (OUTPATIENT)
Dept: SURGERY | Facility: CLINIC | Age: 42
End: 2022-10-31
Payer: MEDICAID

## 2022-10-31 NOTE — TELEPHONE ENCOUNTER
Patient requesting return to work note with no restrictions to return on 11/4/22. Spoke with patient, letter done and printed for patient pick on 11/1/22

## 2022-10-31 NOTE — TELEPHONE ENCOUNTER
----- Message from Cierra Burnham sent at 10/31/2022  2:27 PM CDT -----  Regarding: return to work note  Name of Who is Calling: JUSTINO GO [[id number]]      What is the request in detail: Patient is requesting a callback concerning a return to work letter he states he needs it to advise no restrictions when he return to work       Can the clinic reply by [facility]: no      What Number to Call Back if not in [facility]: [phone]

## 2022-10-31 NOTE — LETTER
October 31, 2022      [facility]  [address], [address]  [place] [address]  Phone: [phone]       Patient: Chadd Alonzo   YOB: 1980  Date of Visit: 10/31/2022    To Whom It May Concern:    Charly Alonzo  was at [facility] on 10/27/2022. The patient may return to work on 11/04/2022 with no restrictions. If you have any questions or concerns, or if I can be of further assistance, please do not hesitate to contact me.    Sincerely,    Pia Sr MA

## 2022-11-29 ENCOUNTER — TELEPHONE (OUTPATIENT)
Dept: UROLOGY | Facility: CLINIC | Age: 42
End: 2022-11-29
Payer: MEDICAID

## 2022-11-29 NOTE — TELEPHONE ENCOUNTER
"----- Message from Chasity Hernandez sent at 11/29/2022 11:25 AM CST -----  Reschedule Existing Appointment                   Appt Date: 12/1    Type of appt : EP     Physician: Tonie    Reason for rescheduling?  Request to change to telehealth    Caller:  Charli-significant other    Contact Preference: [phone]                   Additional Information:  "Thank you for all that you do for our patients'     "

## 2022-11-29 NOTE — TELEPHONE ENCOUNTER
Spoke with pt, he is rescheduled for a telephone visit as requested with cathi (12/1/22 @ 1pm).  Pt has understanding of changes.

## 2022-12-01 ENCOUNTER — OFFICE VISIT (OUTPATIENT)
Dept: UROLOGY | Facility: CLINIC | Age: 42
End: 2022-12-01
Payer: MEDICAID

## 2022-12-01 DIAGNOSIS — N52.1 ERECTILE DYSFUNCTION DUE TO DISEASES CLASSIFIED ELSEWHERE: Primary | ICD-10-CM

## 2022-12-01 PROCEDURE — 1160F PR REVIEW ALL MEDS BY PRESCRIBER/CLIN PHARMACIST DOCUMENTED: ICD-10-PCS | Mod: CPTII,95,, | Performed by: NURSE PRACTITIONER

## 2022-12-01 PROCEDURE — 4010F PR ACE/ARB THEARPY RXD/TAKEN: ICD-10-PCS | Mod: CPTII,95,, | Performed by: NURSE PRACTITIONER

## 2022-12-01 PROCEDURE — 1159F MED LIST DOCD IN RCRD: CPT | Mod: CPTII,95,, | Performed by: NURSE PRACTITIONER

## 2022-12-01 PROCEDURE — 99214 PR OFFICE/OUTPT VISIT, EST, LEVL IV, 30-39 MIN: ICD-10-PCS | Mod: 95,,, | Performed by: NURSE PRACTITIONER

## 2022-12-01 PROCEDURE — 1160F RVW MEDS BY RX/DR IN RCRD: CPT | Mod: CPTII,95,, | Performed by: NURSE PRACTITIONER

## 2022-12-01 PROCEDURE — 4010F ACE/ARB THERAPY RXD/TAKEN: CPT | Mod: CPTII,95,, | Performed by: NURSE PRACTITIONER

## 2022-12-01 PROCEDURE — 1159F PR MEDICATION LIST DOCUMENTED IN MEDICAL RECORD: ICD-10-PCS | Mod: CPTII,95,, | Performed by: NURSE PRACTITIONER

## 2022-12-01 PROCEDURE — 3046F HEMOGLOBIN A1C LEVEL >9.0%: CPT | Mod: CPTII,95,, | Performed by: NURSE PRACTITIONER

## 2022-12-01 PROCEDURE — 3046F PR MOST RECENT HEMOGLOBIN A1C LEVEL > 9.0%: ICD-10-PCS | Mod: CPTII,95,, | Performed by: NURSE PRACTITIONER

## 2022-12-01 PROCEDURE — 99214 OFFICE O/P EST MOD 30 MIN: CPT | Mod: 95,,, | Performed by: NURSE PRACTITIONER

## 2022-12-01 NOTE — PROGRESS NOTES
Established Patient - Audio Only Telehealth Visit     The patient location is: [place]  The chief complaint leading to consultation is: ED  Visit type: Virtual visit with audio only (telephone)  Total time spent with patient: 20 minutes        The reason for the audio only service rather than synchronous audio and video virtual visit was related to technical difficulties or patient preference/necessity.     Each patient to whom I provide medical services by telemedicine is:  (1) informed of the relationship between the physician and patient and the respective role of any other health care provider with respect to management of the patient; and (2) notified that they may decline to receive medical services by telemedicine and may withdraw from such care at any time. Patient verbally consented to receive this service via voice-only telephone call.       HPI: Hx of DM and ED. Recent trial of Cialis 20 mg and Viagra 100 mg did not produce meaningful results- expresses interest in trimix.      Assessment and plan:    --Rx for trimix will be sent to [facility] on 12/9 per pt request  --FU for in office education          This service was not originating from a related E/M service provided within the previous 7 days nor will  to an E/M service or procedure within the next 24 hours or my soonest available appointment.  Prevailing standard of care was able to be met in this audio-only visit.

## 2022-12-09 ENCOUNTER — PATIENT MESSAGE (OUTPATIENT)
Dept: UROLOGY | Facility: CLINIC | Age: 42
End: 2022-12-09
Payer: MEDICAID

## 2022-12-09 DIAGNOSIS — N52.1 ERECTILE DYSFUNCTION DUE TO DISEASES CLASSIFIED ELSEWHERE: Primary | ICD-10-CM

## 2022-12-09 RX ORDER — PAPAVERINE HYDROCHLORIDE 30 MG/ML
INJECTION INTRAMUSCULAR; INTRAVENOUS
Qty: 10 ML | Refills: 12 | Status: SHIPPED | OUTPATIENT
Start: 2022-12-09

## 2022-12-12 ENCOUNTER — TELEPHONE (OUTPATIENT)
Dept: ORTHOPEDICS | Facility: CLINIC | Age: 42
End: 2022-12-12
Payer: MEDICAID

## 2022-12-12 ENCOUNTER — TELEPHONE (OUTPATIENT)
Dept: UROLOGY | Facility: CLINIC | Age: 42
End: 2022-12-12
Payer: MEDICAID

## 2022-12-12 NOTE — TELEPHONE ENCOUNTER
----- Message from Kayleigh Lara sent at 12/12/2022  3:00 PM CST -----  Contact: Priyanka @ [phone]  Priyanka pt Finance calling regarding Appointment Access  (message) for #is calling to get virtual audio appt for pt to learn how to give pt his meds,asking for call

## 2022-12-12 NOTE — TELEPHONE ENCOUNTER
Patient's significant other called for a virtual appt for help with administrering I have her scheduled for Jan 5th but she was wondering if she can get something sooner. I told her that I would leave a message for [facility] staff and someone will give her a call in the morning. Pt v/u

## 2022-12-12 NOTE — TELEPHONE ENCOUNTER
----- Message from Eileen Parmar sent at 12/12/2022  8:28 AM CST -----  Patient is calling stating that his significant other Priyanka needs to speak to medical staff.  Please contact her at [phone]

## 2022-12-13 ENCOUNTER — TELEPHONE (OUTPATIENT)
Dept: UROLOGY | Facility: CLINIC | Age: 42
End: 2022-12-13
Payer: MEDICAID

## 2022-12-13 NOTE — TELEPHONE ENCOUNTER
----- Message from Lamar Guzman sent at 12/13/2022  3:32 PM CST -----  Contact: Charli Peña  Pt friend is calling in regards to pt virtual appt. Pt is trying to get a sooner virtual appt. Date if available.     Confirmed contact below:   Contact Name:Charli Cueva stated to call between &am-8:30 am.   Phone Number: [phone]

## 2022-12-14 ENCOUNTER — TELEPHONE (OUTPATIENT)
Dept: UROLOGY | Facility: CLINIC | Age: 42
End: 2022-12-14
Payer: MEDICAID

## 2022-12-14 NOTE — TELEPHONE ENCOUNTER
----- Message from Kayleigh Lara sent at 12/14/2022 10:40 AM CST -----  Contact: pt @ [phone]  Chadd Alonzo calling regarding Patient Advice (message) for #pt is returning call from Dre, asking for call back

## 2022-12-14 NOTE — TELEPHONE ENCOUNTER
Spoke with pt. Telephone appt scheduled sooner for Trimix educations. All questions answered. Pt verbalized understanding.

## 2022-12-16 ENCOUNTER — OFFICE VISIT (OUTPATIENT)
Dept: UROLOGY | Facility: CLINIC | Age: 42
End: 2022-12-16
Payer: MEDICAID

## 2022-12-16 DIAGNOSIS — N52.1 ERECTILE DYSFUNCTION DUE TO DISEASES CLASSIFIED ELSEWHERE: Primary | ICD-10-CM

## 2022-12-16 PROCEDURE — 99214 OFFICE O/P EST MOD 30 MIN: CPT | Mod: 95,,, | Performed by: NURSE PRACTITIONER

## 2022-12-16 PROCEDURE — 1159F PR MEDICATION LIST DOCUMENTED IN MEDICAL RECORD: ICD-10-PCS | Mod: CPTII,95,, | Performed by: NURSE PRACTITIONER

## 2022-12-16 PROCEDURE — 1160F RVW MEDS BY RX/DR IN RCRD: CPT | Mod: CPTII,95,, | Performed by: NURSE PRACTITIONER

## 2022-12-16 PROCEDURE — 3046F HEMOGLOBIN A1C LEVEL >9.0%: CPT | Mod: CPTII,95,, | Performed by: NURSE PRACTITIONER

## 2022-12-16 PROCEDURE — 99214 PR OFFICE/OUTPT VISIT, EST, LEVL IV, 30-39 MIN: ICD-10-PCS | Mod: 95,,, | Performed by: NURSE PRACTITIONER

## 2022-12-16 PROCEDURE — 4010F PR ACE/ARB THEARPY RXD/TAKEN: ICD-10-PCS | Mod: CPTII,95,, | Performed by: NURSE PRACTITIONER

## 2022-12-16 PROCEDURE — 1159F MED LIST DOCD IN RCRD: CPT | Mod: CPTII,95,, | Performed by: NURSE PRACTITIONER

## 2022-12-16 PROCEDURE — 3046F PR MOST RECENT HEMOGLOBIN A1C LEVEL > 9.0%: ICD-10-PCS | Mod: CPTII,95,, | Performed by: NURSE PRACTITIONER

## 2022-12-16 PROCEDURE — 1160F PR REVIEW ALL MEDS BY PRESCRIBER/CLIN PHARMACIST DOCUMENTED: ICD-10-PCS | Mod: CPTII,95,, | Performed by: NURSE PRACTITIONER

## 2022-12-16 PROCEDURE — 4010F ACE/ARB THERAPY RXD/TAKEN: CPT | Mod: CPTII,95,, | Performed by: NURSE PRACTITIONER

## 2022-12-16 NOTE — PROGRESS NOTES
Established Patient - Audio Only Telehealth Visit     The patient location is: [place]  The chief complaint leading to consultation is: Trimix education   Visit type: Virtual visit with audio only (telephone)  Total time spent with patient: 20 minutes        The reason for the audio only service rather than synchronous audio and video virtual visit was related to technical difficulties or patient preference/necessity.     Each patient to whom I provide medical services by telemedicine is:  (1) informed of the relationship between the physician and patient and the respective role of any other health care provider with respect to management of the patient; and (2) notified that they may decline to receive medical services by telemedicine and may withdraw from such care at any time. Patient verbally consented to receive this service via voice-only telephone call.       HPI: Hx of DM and ED. Recent trial of Cialis 20 mg and Viagra 100 mg did not produce meaningful results- expresses interest in trimix.      Assessment and plan:      We discussed ED and the contributing factors. We reviewed his personal factors that contribute to ED. Patient was educated on ED treatments. We discussed PDE-5 inhibitors, RAMO, Muse, and IPP.    He is here today for the Intracorporal injection/ICI education; will give his first injection at home with his wife's assistance. Educational materials were supplied at previous visit.     ICI is an injectable medication that consists of three different medications to produce an erection. It is known as a Trimix Compound or PEP. The medication is a compound medication and is only mixed up at certain pharmacies known as a compound pharmacy. The medication has to be stored in the refrigerator. Improper storage decreases the effectiveness of the medication.     He was educated that it is an injection. With any injection there is risk for possible pain at the injection site as well as risk for an infection.  Clean technique must be followed to help prevent infection. Proper needle disposable is necessary. He voices understanding.    1. Wipe off top of medication vial with an alcohol prep.   2. With the vial held firmly on a flat surface, insert the syringe into the vial.  3. Now carefully  the vial with the needle in place and turn upside down.  4. You can then push in syringe (like you are giving a shot) to get all the air out of the syringe.  5. You can then pull the plunger of the syringe back down while keeping the needle inserted in the vial to get your desired results.   6. If having difficulty seeing the medication, rapidly pull back the syringe and then you  will see the medication fill up. You may never get all the tiny air bubbles out. It is ok.      The injection is best given when standing up and the penis is positioned perpendicular to the body. The urethral opening should be facing away from the body. Injection is always given on the lateral or side of the penis. Never give the injection to the top of the penis to avoid possible trauma to arteries and veins. Never give the injection to the bottom of the penis to avoid trauma to the urethra. He should alternate the injection sites to avoid the build up of scar tissue due to multiple injections.    This medication can increase the risk of erections lasting longer than 4 hours. This is known as a priapism and is a medical emergency. This requires immediate medical attention. This is main reason we give the first dose in the office today. We start at low dose and see how well the patients reacts. We can increase the medication if needed depending on the reaction the patient receives today. We discussed the fine line between enough medication for penetration and too much leading to a priapism. He voices understanding.    He was instructed to start with 5 unit trial dose and increase to 10 units.  If noticing a decrease in reaction he can increase the  dosage by 5 units or 0.05ml. He may also refill the Rx.     If have any questions, please give me a call. Educational materials were given to patient and all questions were answered. He voiced understanding and left the office without a priapism. Follow up needed once a year.     This note is dictated on M*Modal word recognition program.  There are word recognition mistakes that are occasionally missed on review.      This service was not originating from a related E/M service provided within the previous 7 days nor will  to an E/M service or procedure within the next 24 hours or my soonest available appointment.  Prevailing standard of care was able to be met in this audio-only visit.

## 2023-06-29 NOTE — PROGRESS NOTES
The patient location is: [place]  The chief complaint leading to consultation is: ED    Visit type: audiovisual    Face to Face time with patient: 10  10 minutes of total time spent on the encounter, which includes face to face time and non-face to face time preparing to see the patient (eg, review of tests), Obtaining and/or reviewing separately obtained history, Documenting clinical information in the electronic or other health record, Independently interpreting results (not separately reported) and communicating results to the patient/family/caregiver, or Care coordination (not separately reported).         Each patient to whom he or she provides medical services by telemedicine is:  (1) informed of the relationship between the physician and patient and the respective role of any other health care provider with respect to management of the patient; and (2) notified that he or she may decline to receive medical services by telemedicine and may withdraw from such care at any time.    Notes:   Subjective:      Chadd Alonzo is a 43 y.o. male who returns today regarding his ED.    History of DM and ED refractory to oral agent as well as Trimix injections. Expresses interest in IPP  Denies LUTS today     The following portions of the patient's history were reviewed and updated as appropriate: allergies, current medications, past family history, past medical history, past social history, past surgical history and problem list.    Review of Systems  A comprehensive multipoint review of systems was negative except as otherwise stated in the HPI.     Objective:   Vitals: There were no vitals taken for this visit.    Physical Exam   General: alert and oriented, no acute distress  Respiratory: Symmetric expansion, non-labored breathing  Psych: normal judgment and insight, normal mood/affect, and non-anxious    Lab Review   Lab Results   Component Value Date    WBC 9.97 10/13/2022    HGB 14.2 10/13/2022    HCT 41.0 10/13/2022    HCT  47 10/12/2022    MCV 84 10/13/2022     10/13/2022     Lab Results   Component Value Date    CREATININE 0.9 10/12/2022    BUN 14 10/12/2022         Assessment and Plan:   1. Erectile dysfunction due to diseases classified elsewhere  --patient is aware that IPP are not performed at [facility] recommend he follow-up with urologist at [facility] or [facility]     --rtc PRN    This note is dictated on M*Modal word recognition program.  There are word recognition mistakes that are occasionally missed on review.

## 2023-06-30 ENCOUNTER — OFFICE VISIT (OUTPATIENT)
Dept: UROLOGY | Facility: CLINIC | Age: 43
End: 2023-06-30
Payer: MEDICAID

## 2023-06-30 DIAGNOSIS — N52.1 ERECTILE DYSFUNCTION DUE TO DISEASES CLASSIFIED ELSEWHERE: Primary | ICD-10-CM

## 2023-06-30 PROCEDURE — 99214 PR OFFICE/OUTPT VISIT, EST, LEVL IV, 30-39 MIN: ICD-10-PCS | Mod: 95,,, | Performed by: NURSE PRACTITIONER

## 2023-06-30 PROCEDURE — 1159F MED LIST DOCD IN RCRD: CPT | Mod: CPTII,95,, | Performed by: NURSE PRACTITIONER

## 2023-06-30 PROCEDURE — 1160F RVW MEDS BY RX/DR IN RCRD: CPT | Mod: CPTII,95,, | Performed by: NURSE PRACTITIONER

## 2023-06-30 PROCEDURE — 1159F PR MEDICATION LIST DOCUMENTED IN MEDICAL RECORD: ICD-10-PCS | Mod: CPTII,95,, | Performed by: NURSE PRACTITIONER

## 2023-06-30 PROCEDURE — 99214 OFFICE O/P EST MOD 30 MIN: CPT | Mod: 95,,, | Performed by: NURSE PRACTITIONER

## 2023-06-30 PROCEDURE — 1160F PR REVIEW ALL MEDS BY PRESCRIBER/CLIN PHARMACIST DOCUMENTED: ICD-10-PCS | Mod: CPTII,95,, | Performed by: NURSE PRACTITIONER

## 2023-08-10 ENCOUNTER — HOSPITAL ENCOUNTER (EMERGENCY)
Facility: HOSPITAL | Age: 43
Discharge: LEFT BEFORE DISCHARGE | End: 2023-08-11
Attending: STUDENT IN AN ORGANIZED HEALTH CARE EDUCATION/TRAINING PROGRAM
Payer: MEDICAID

## 2023-08-10 DIAGNOSIS — R06.02 SOB (SHORTNESS OF BREATH): ICD-10-CM

## 2023-08-10 DIAGNOSIS — R11.10 VOMITING: ICD-10-CM

## 2023-08-10 LAB
ALBUMIN SERPL BCP-MCNC: 4.2 G/DL (ref 3.5–5.2)
ALP SERPL-CCNC: 75 U/L (ref 55–135)
ALT SERPL W/O P-5'-P-CCNC: 34 U/L (ref 10–44)
ANION GAP SERPL CALC-SCNC: 23 MMOL/L (ref 8–16)
AST SERPL-CCNC: 13 U/L (ref 10–40)
BASOPHILS # BLD AUTO: 0.03 K/UL (ref 0–0.2)
BASOPHILS NFR BLD: 0.3 % (ref 0–1.9)
BILIRUB SERPL-MCNC: 0.6 MG/DL (ref 0.1–1)
BUN SERPL-MCNC: 11 MG/DL (ref 6–20)
CALCIUM SERPL-MCNC: 9.8 MG/DL (ref 8.7–10.5)
CHLORIDE SERPL-SCNC: 105 MMOL/L (ref 95–110)
CO2 SERPL-SCNC: 13 MMOL/L (ref 23–29)
CREAT SERPL-MCNC: 0.8 MG/DL (ref 0.5–1.4)
DIFFERENTIAL METHOD: ABNORMAL
EOSINOPHIL # BLD AUTO: 0 K/UL (ref 0–0.5)
EOSINOPHIL NFR BLD: 0 % (ref 0–8)
ERYTHROCYTE [DISTWIDTH] IN BLOOD BY AUTOMATED COUNT: 13.2 % (ref 11.5–14.5)
EST. GFR  (NO RACE VARIABLE): >60 ML/MIN/1.73 M^2
GLUCOSE SERPL-MCNC: 244 MG/DL (ref 70–110)
HCT VFR BLD AUTO: 50.5 % (ref 40–54)
HCV AB SERPL QL IA: NORMAL
HGB BLD-MCNC: 16.9 G/DL (ref 14–18)
HIV 1+2 AB+HIV1 P24 AG SERPL QL IA: NORMAL
IMM GRANULOCYTES # BLD AUTO: 0.02 K/UL (ref 0–0.04)
IMM GRANULOCYTES NFR BLD AUTO: 0.2 % (ref 0–0.5)
LIPASE SERPL-CCNC: 9 U/L (ref 4–60)
LYMPHOCYTES # BLD AUTO: 1.2 K/UL (ref 1–4.8)
LYMPHOCYTES NFR BLD: 11.3 % (ref 18–48)
MCH RBC QN AUTO: 28.7 PG (ref 27–31)
MCHC RBC AUTO-ENTMCNC: 33.5 G/DL (ref 32–36)
MCV RBC AUTO: 86 FL (ref 82–98)
MONOCYTES # BLD AUTO: 0.8 K/UL (ref 0.3–1)
MONOCYTES NFR BLD: 7.5 % (ref 4–15)
NEUTROPHILS # BLD AUTO: 8.3 K/UL (ref 1.8–7.7)
NEUTROPHILS NFR BLD: 80.7 % (ref 38–73)
NRBC BLD-RTO: 0 /100 WBC
PLATELET # BLD AUTO: 314 K/UL (ref 150–450)
PMV BLD AUTO: 9.3 FL (ref 9.2–12.9)
POTASSIUM SERPL-SCNC: 3.9 MMOL/L (ref 3.5–5.1)
PROT SERPL-MCNC: 7.9 G/DL (ref 6–8.4)
RBC # BLD AUTO: 5.89 M/UL (ref 4.6–6.2)
SODIUM SERPL-SCNC: 141 MMOL/L (ref 136–145)
WBC # BLD AUTO: 10.32 K/UL (ref 3.9–12.7)

## 2023-08-10 PROCEDURE — 80048 BASIC METABOLIC PNL TOTAL CA: CPT | Mod: XB

## 2023-08-10 PROCEDURE — 63600175 PHARM REV CODE 636 W HCPCS

## 2023-08-10 PROCEDURE — 83690 ASSAY OF LIPASE: CPT

## 2023-08-10 PROCEDURE — 93010 EKG 12-LEAD: ICD-10-PCS | Mod: ,,, | Performed by: INTERNAL MEDICINE

## 2023-08-10 PROCEDURE — 86803 HEPATITIS C AB TEST: CPT | Performed by: PHYSICIAN ASSISTANT

## 2023-08-10 PROCEDURE — 96374 THER/PROPH/DIAG INJ IV PUSH: CPT

## 2023-08-10 PROCEDURE — 96375 TX/PRO/DX INJ NEW DRUG ADDON: CPT

## 2023-08-10 PROCEDURE — 93005 ELECTROCARDIOGRAM TRACING: CPT

## 2023-08-10 PROCEDURE — 25000003 PHARM REV CODE 250: Performed by: STUDENT IN AN ORGANIZED HEALTH CARE EDUCATION/TRAINING PROGRAM

## 2023-08-10 PROCEDURE — 85025 COMPLETE CBC W/AUTO DIFF WBC: CPT

## 2023-08-10 PROCEDURE — 81001 URINALYSIS AUTO W/SCOPE: CPT

## 2023-08-10 PROCEDURE — 87389 HIV-1 AG W/HIV-1&-2 AB AG IA: CPT | Performed by: PHYSICIAN ASSISTANT

## 2023-08-10 PROCEDURE — 99285 EMERGENCY DEPT VISIT HI MDM: CPT | Mod: 25

## 2023-08-10 PROCEDURE — C9113 INJ PANTOPRAZOLE SODIUM, VIA: HCPCS

## 2023-08-10 PROCEDURE — 80053 COMPREHEN METABOLIC PANEL: CPT

## 2023-08-10 PROCEDURE — 96361 HYDRATE IV INFUSION ADD-ON: CPT

## 2023-08-10 PROCEDURE — 93010 ELECTROCARDIOGRAM REPORT: CPT | Mod: ,,, | Performed by: INTERNAL MEDICINE

## 2023-08-10 PROCEDURE — 83605 ASSAY OF LACTIC ACID: CPT

## 2023-08-10 RX ORDER — FENTANYL CITRATE 50 UG/ML
50 INJECTION, SOLUTION INTRAMUSCULAR; INTRAVENOUS
Status: COMPLETED | OUTPATIENT
Start: 2023-08-10 | End: 2023-08-10

## 2023-08-10 RX ORDER — MAG HYDROX/ALUMINUM HYD/SIMETH 200-200-20
15 SUSPENSION, ORAL (FINAL DOSE FORM) ORAL
Status: COMPLETED | OUTPATIENT
Start: 2023-08-10 | End: 2023-08-10

## 2023-08-10 RX ORDER — ONDANSETRON 2 MG/ML
4 INJECTION INTRAMUSCULAR; INTRAVENOUS
Status: COMPLETED | OUTPATIENT
Start: 2023-08-10 | End: 2023-08-10

## 2023-08-10 RX ORDER — PANTOPRAZOLE SODIUM 40 MG/10ML
40 INJECTION, POWDER, LYOPHILIZED, FOR SOLUTION INTRAVENOUS
Status: COMPLETED | OUTPATIENT
Start: 2023-08-10 | End: 2023-08-10

## 2023-08-10 RX ADMIN — ONDANSETRON 4 MG: 2 INJECTION INTRAMUSCULAR; INTRAVENOUS at 09:08

## 2023-08-10 RX ADMIN — SODIUM CHLORIDE, POTASSIUM CHLORIDE, SODIUM LACTATE AND CALCIUM CHLORIDE 1000 ML: 600; 310; 30; 20 INJECTION, SOLUTION INTRAVENOUS at 11:08

## 2023-08-10 RX ADMIN — ALUMINUM HYDROXIDE, MAGNESIUM HYDROXIDE, AND SIMETHICONE 15 ML: 200; 200; 20 SUSPENSION ORAL at 10:08

## 2023-08-10 RX ADMIN — PANTOPRAZOLE SODIUM 40 MG: 40 INJECTION, POWDER, FOR SOLUTION INTRAVENOUS at 09:08

## 2023-08-10 RX ADMIN — FENTANYL CITRATE 50 MCG: 50 INJECTION INTRAMUSCULAR; INTRAVENOUS at 09:08

## 2023-08-10 NOTE — Clinical Note
Date: 8/11/2023  Patient: Chadd Alonzo  Admitted: 8/10/2023  8:47 PM  Attending Provider: No att. providers found    Chadd Alonzo or his authorized caregiver has made the decision for the patient to leave the emergency department against the advice of hi s attending physician. He or his authorized caregiver has been informed and understands the inherent risks, including death, DKA.  He or his authorized caregiver has decided to accept the responsibility for this decision. Chadd Alonzo and all necessary  parties have been advised that he may return for further evaluation or treatment. His condition at time of discharge was fair.  Chadd Alonzo had current vital signs as follows:  BP (!) 161/89   Pulse (!) 124   Temp 98.9 °F (37.2 °C) (Oral)   Resp 1 8   Ht 6' (1.829 m)   Wt 113.4 kg (250 lb)

## 2023-08-11 VITALS
BODY MASS INDEX: 33.86 KG/M2 | OXYGEN SATURATION: 97 % | RESPIRATION RATE: 18 BRPM | TEMPERATURE: 99 F | HEART RATE: 124 BPM | WEIGHT: 250 LBS | DIASTOLIC BLOOD PRESSURE: 89 MMHG | HEIGHT: 72 IN | SYSTOLIC BLOOD PRESSURE: 161 MMHG

## 2023-08-11 LAB
ALLENS TEST: ABNORMAL
B-OH-BUTYR BLD STRIP-SCNC: 4.3 MMOL/L (ref 0–0.5)
BACTERIA #/AREA URNS AUTO: NORMAL /HPF
BILIRUB UR QL STRIP: NEGATIVE
CLARITY UR REFRACT.AUTO: CLEAR
COLOR UR AUTO: COLORLESS
GLUCOSE UR QL STRIP: ABNORMAL
HCO3 UR-SCNC: 12.9 MMOL/L (ref 24–28)
HCT VFR BLD CALC: 47 %PCV (ref 36–54)
HGB UR QL STRIP: NEGATIVE
KETONES UR QL STRIP: ABNORMAL
LACTATE SERPL-SCNC: 3.3 MMOL/L (ref 0.5–2.2)
LEUKOCYTE ESTERASE UR QL STRIP: NEGATIVE
MICROSCOPIC COMMENT: NORMAL
NITRITE UR QL STRIP: NEGATIVE
PCO2 BLDA: 22.9 MMHG (ref 35–45)
PH SMN: 7.36 [PH] (ref 7.35–7.45)
PH UR STRIP: 6 [PH] (ref 5–8)
PO2 BLDA: 46 MMHG (ref 40–60)
POC BE: -13 MMOL/L
POC IONIZED CALCIUM: 1.24 MMOL/L (ref 1.06–1.42)
POC SATURATED O2: 81 % (ref 95–100)
POC TCO2: 14 MMOL/L (ref 24–29)
POTASSIUM BLD-SCNC: 4 MMOL/L (ref 3.5–5.1)
PROT UR QL STRIP: NEGATIVE
SAMPLE: ABNORMAL
SITE: ABNORMAL
SODIUM BLD-SCNC: 140 MMOL/L (ref 136–145)
SP GR UR STRIP: >1.03 (ref 1–1.03)
URN SPEC COLLECT METH UR: ABNORMAL
WBC #/AREA URNS AUTO: 0 /HPF (ref 0–5)
YEAST UR QL AUTO: NORMAL

## 2023-08-11 PROCEDURE — 63600175 PHARM REV CODE 636 W HCPCS

## 2023-08-11 PROCEDURE — 25000003 PHARM REV CODE 250

## 2023-08-11 PROCEDURE — 25500020 PHARM REV CODE 255

## 2023-08-11 PROCEDURE — 99900035 HC TECH TIME PER 15 MIN (STAT)

## 2023-08-11 PROCEDURE — 82803 BLOOD GASES ANY COMBINATION: CPT

## 2023-08-11 PROCEDURE — 82010 KETONE BODYS QUAN: CPT

## 2023-08-11 PROCEDURE — 96361 HYDRATE IV INFUSION ADD-ON: CPT

## 2023-08-11 RX ORDER — VALSARTAN 160 MG/1
160 TABLET ORAL
Status: COMPLETED | OUTPATIENT
Start: 2023-08-11 | End: 2023-08-11

## 2023-08-11 RX ORDER — SODIUM CHLORIDE 0.9 % (FLUSH) 0.9 %
10 SYRINGE (ML) INJECTION
Status: DISCONTINUED | OUTPATIENT
Start: 2023-08-11 | End: 2023-08-11 | Stop reason: HOSPADM

## 2023-08-11 RX ORDER — SODIUM CHLORIDE 9 MG/ML
1000 INJECTION, SOLUTION INTRAVENOUS CONTINUOUS
Status: DISCONTINUED | OUTPATIENT
Start: 2023-08-11 | End: 2023-08-11 | Stop reason: HOSPADM

## 2023-08-11 RX ORDER — DEXTROSE MONOHYDRATE 100 MG/ML
25 INJECTION, SOLUTION INTRAVENOUS
Status: DISCONTINUED | OUTPATIENT
Start: 2023-08-11 | End: 2023-08-11 | Stop reason: HOSPADM

## 2023-08-11 RX ORDER — DEXTROSE MONOHYDRATE 100 MG/ML
INJECTION, SOLUTION INTRAVENOUS
Status: DISCONTINUED | OUTPATIENT
Start: 2023-08-11 | End: 2023-08-11 | Stop reason: HOSPADM

## 2023-08-11 RX ORDER — DEXTROSE MONOHYDRATE 100 MG/ML
12.5 INJECTION, SOLUTION INTRAVENOUS
Status: DISCONTINUED | OUTPATIENT
Start: 2023-08-11 | End: 2023-08-11 | Stop reason: HOSPADM

## 2023-08-11 RX ORDER — DROPERIDOL 2.5 MG/ML
1.25 INJECTION, SOLUTION INTRAMUSCULAR; INTRAVENOUS ONCE
Status: COMPLETED | OUTPATIENT
Start: 2023-08-11 | End: 2023-08-11

## 2023-08-11 RX ORDER — DEXTROSE MONOHYDRATE AND SODIUM CHLORIDE 5; .45 G/100ML; G/100ML
INJECTION, SOLUTION INTRAVENOUS CONTINUOUS PRN
Status: DISCONTINUED | OUTPATIENT
Start: 2023-08-11 | End: 2023-08-11 | Stop reason: HOSPADM

## 2023-08-11 RX ADMIN — VALSARTAN 160 MG: 160 TABLET, FILM COATED ORAL at 02:08

## 2023-08-11 RX ADMIN — DROPERIDOL 1.25 MG: 2.5 INJECTION, SOLUTION INTRAMUSCULAR; INTRAVENOUS at 12:08

## 2023-08-11 RX ADMIN — IOHEXOL 100 ML: 350 INJECTION, SOLUTION INTRAVENOUS at 01:08

## 2023-08-11 NOTE — PROVIDER PROGRESS NOTES - EMERGENCY DEPT.
Encounter Date: 8/10/2023    ED Physician Progress Notes          ED staff SIGN OUT NOTE    Patient s/o to me by Dr. Mas pending Labs, symptomatic treatment, re-evaluation for disposition     Briefly, patient with a history of diabetes who presents emergency department with 2 days of polyuria and polydipsia, nausea vomiting and epigastric abdominal pain.    Patient was noted to have evidence of anion gap metabolic acidosis on labs and thus lactate ordered which returned at 3, beta hydroxybutyrate returned at 4    The patient had persistent epigastric abdominal pain in the CT abdomen pelvis ordered, unremarkable    Given the patient's laboratory and vital sign abnormalities, we recommended admission and insulin infusion for concern of diabetic ketoacidosis    The patient explains that he needs to leave the emergency department so that he can attend work in the morning because otherwise he will lose his job and declined admission.    I personally had a discussion with the patient explains that his vital sign abnormalities and laboratory abnormalities are concerning for acid buildup in his blood, diabetic ketoacidosis which can be life-threatening.  The patient expressed understanding that his evaluation today is concerning for an acidemia and specifically diabetic ketoacidosis and that we recommended admission with IV fluids and IV insulin and expressed understanding that his condition could be rapidly fatal and could result in death.  The patient will plan to go to work and will call 911 if his condition worsens and will return to the emergency department tomorrow if he is able after work.        Critical Care   Date: 08/11/2023  Performed by: Sharita aLn MD   Authorized by: Sharita Lan MD      Total critical care time (exclusive of procedural time) : 35 minutes, exclusive of separately billable procedures and treating other patients and teaching time.    Critical care was necessary to treat or prevent imminent or  life-threatening deterioration of the following conditions:  DKA    Due to a high probability of clinically significant, life threatening deterioration, the patient required my highest level of preparedness to intervene emergently and I personally spent this critical care time directly and personally managing the patient. This critical care time included obtaining a history; examining the patient; pulse oximetry; ordering and review of studies; arranging urgent treatment with development of a management plan; evaluation of patient's response to treatment; frequent reassessment, documentation, and, discussions with other providers, patient and family members.

## 2023-08-11 NOTE — ED TRIAGE NOTES
Been N+V for two days Abd pain started today.  States he has not been eating or drinking for two days.PMH, diabetes, HTN, cholecystectomy.  Works in an un-cooled warehouse.

## 2023-08-11 NOTE — ED PROVIDER NOTES
Encounter Date: 8/10/2023       History     Chief Complaint   Patient presents with    Abdominal Pain     Been N+V for two days Abd pain started today.  States he has not been eating or drinking for two days.PMH, diabetes, HTN, cholecystectomy.  Works in an un-cooled warehouse.    Vomiting    Nausea     Chadd Alonzo is a 42yo male with DM, HTN, GERD, prior cholecystectomy presenting with nausea, vomiting, and epigastric abdominal pain. He reports yesterday he began to feel nauseated and has been vomiting continuously without being able to keep anything down. He describes the vomit as appearing pinkish but no coffee ground or streaks of blood. He denies diarrhea, last BM around 4PM but feels constipation. He denies f/c, CP, reports some SOB with his pain. Reports occasional alcohol usage. BG with EMS ~250.        Review of patient's allergies indicates:  No Known Allergies  Past Medical History:   Diagnosis Date    Diabetes mellitus     Hypertension      Past Surgical History:   Procedure Laterality Date    LAPAROSCOPIC CHOLECYSTECTOMY N/A 10/12/2022    Procedure: CHOLECYSTECTOMY, LAPAROSCOPIC;  Surgeon: Anjel Akhtar MD;  Location: [facility];  Service: General;  Laterality: N/A;     History reviewed. No pertinent family history.  Social History     Tobacco Use    Smoking status: Never    Smokeless tobacco: Never   Substance Use Topics    Alcohol use: No    Drug use: No     Review of Systems   Constitutional:  Negative for chills and fever.   Respiratory:  Positive for shortness of breath. Negative for cough.    Cardiovascular:  Negative for chest pain.   Gastrointestinal:  Positive for abdominal pain (epigastric), nausea and vomiting. Negative for anal bleeding, blood in stool, constipation and diarrhea.   Endocrine: Polyphagia: feels thirsty.   Genitourinary:  Negative for difficulty urinating, flank pain and hematuria.   Musculoskeletal:  Negative for back pain.   Skin:  Negative for rash.   Neurological:   Negative for light-headedness and headaches.       Physical Exam     Initial Vitals [08/10/23 2019]   BP Pulse Resp Temp SpO2   (!) 187/117 (!) 124 (!) 24 98.9 °F (37.2 °C) 99 %      MAP       --         Physical Exam    Nursing note and vitals reviewed.  Constitutional: He appears well-developed. He appears distressed.   HENT:   Head: Normocephalic and atraumatic.   Eyes: No scleral icterus.   Cardiovascular:  Normal rate, regular rhythm and normal heart sounds.     Exam reveals no friction rub.       No murmur heard.  Pulmonary/Chest: Breath sounds normal. No respiratory distress. He has no wheezes.   Abdominal: Abdomen is soft. He exhibits no distension. There is abdominal tenderness (epigastric). There is no rebound and no guarding.   Musculoskeletal:         General: No tenderness or edema.     Neurological: He is alert.   Skin: Skin is warm. Capillary refill takes less than 2 seconds.         ED Course   Procedures  Labs Reviewed   CBC W/ AUTO DIFFERENTIAL - Abnormal; Notable for the following components:       Result Value    Gran # (ANC) 8.3 (*)     Gran % 80.7 (*)     Lymph % 11.3 (*)     All other components within normal limits    Narrative:     Release to patient->Immediate   COMPREHENSIVE METABOLIC PANEL - Abnormal; Notable for the following components:    CO2 13 (*)     Glucose 244 (*)     Anion Gap 23 (*)     All other components within normal limits    Narrative:     Release to patient->Immediate   URINALYSIS, REFLEX TO URINE CULTURE - Abnormal; Notable for the following components:    Color, UA Colorless (*)     Specific Gravity, UA >1.030 (*)     Glucose, UA 4+ (*)     Ketones, UA 3+ (*)     All other components within normal limits    Narrative:     Specimen Source->Urine   LACTIC ACID, PLASMA - Abnormal; Notable for the following components:    Lactate (Lactic Acid) 3.3 (*)     All other components within normal limits   BETA - HYDROXYBUTYRATE, SERUM - Abnormal; Notable for the following  components:    Beta-Hydroxybutyrate 4.3 (*)     All other components within normal limits   ISTAT PROCEDURE - Abnormal; Notable for the following components:    POC PCO2 22.9 (*)     POC HCO3 12.9 (*)     POC SATURATED O2 81 (*)     POC TCO2 14 (*)     All other components within normal limits   LIPASE    Narrative:     Release to patient->Immediate   HIV 1 / 2 ANTIBODY    Narrative:     Release to patient->Immediate   HEPATITIS C ANTIBODY    Narrative:     Release to patient->Immediate   URINALYSIS MICROSCOPIC    Narrative:     Specimen Source->Urine   BETA - HYDROXYBUTYRATE, SERUM   ISTAT CHEM8   POCT GLUCOSE MONITORING CONTINUOUS     EKG Readings: (Independently Interpreted)   Initial Reading: No STEMI. Rhythm: Sinus Tachycardia. Heart Rate: 118. ST Segments: Normal ST Segments.       Imaging Results              CT Abdomen Pelvis With Contrast (Final result)  Result time 08/11/23 02:38:49      Final result by David Osborne MD (08/11/23 02:38:49)                   Impression:      No acute abdominopelvic abnormality.    Hepatomegaly.    Prostatomegaly.  Consider correlation with PSA.    Small-sized hiatal hernia    Electronically signed by resident: Katalina Araujo  Date:    08/11/2023  Time:    01:46    Electronically signed by: David Osborne MD  Date:    08/11/2023  Time:    02:38               Narrative:    EXAMINATION:  CT ABDOMEN PELVIS WITH CONTRAST    CLINICAL HISTORY:  Abdominal pain, acute, nonlocalized;    TECHNIQUE:  Axial images of the abdomen and pelvis were acquired after the use of 100 cc [id number] IV contrast .  Coronal and sagittal reconstructions were also obtained    COMPARISON:  Ultrasound 10/11/2022.  CT 10/11/2022    FINDINGS:  LOWER THORAX:   Bibasilar ground-glass opacities.    LIVER: Enlarged measuring 20.9 cm.  No focal hepatic lesion.    GALLBLADDER: Surgically absent.    BILE DUCTS: No evidence of dilated ducts.    PANCREAS: No mass or peripancreatic fat stranding.    SPLEEN:  Unremarkable.    ADRENALS: Unremarkable.    KIDNEYS/URETERS: Normal in size and location. Normal enhancement. No hydronephrosis or nephrolithiasis. No ureteral dilatation.    BLADDER: No evidence of wall thickening.    REPRODUCTIVE: Enlarged prostate measuring 4.9 cm.  Small clips in the perineum region suggesting prior vasectomy.    STOMACH/DUODENUM: Small sized hiatal hernia.  Distended stomach similar to prior.    BOWEL/MESENTERY: Small bowel is normal in caliber with no evidence of obstruction. No evidence of inflammation or wall thickening.  Colon demonstrates no focal wall thickening.  Appendix is unremarkable    PERITONEUM: No intraperitoneal free air or fluid    LYMPH NODES: No retroperitoneal lymphadenopathy.    VESSELS: No aneurysm. No significant calcific atherosclerosis.    ABDOMINAL WALL:  Stable calcification about the umbilicus.    BONES: Grade 1 retrolisthesis L5-S1 with degenerative change and vacuum disc phenomena at L5-S1.  No acute fracture.                                       X-Ray Chest AP Portable (Final result)  Result time 08/10/23 22:04:15      Final result by David Osborne MD (08/10/23 22:04:15)                   Impression:      No acute findings.    No significant change from prior study.      Electronically signed by: David Osborne MD  Date:    08/10/2023  Time:    22:04               Narrative:    EXAMINATION:  XR CHEST AP PORTABLE    CLINICAL HISTORY:  Vomiting, unspecified    TECHNIQUE:  Single frontal view of the chest was performed.    COMPARISON:  02/07/2022.    FINDINGS:  Mild elevation right hemidiaphragm, similar to prior.    Decreased lung volumes with hypoventilatory changes, similar to prior.    Heart and lungs  appear unchanged when allowing for differences in technique and positioning.                                    X-Rays:   Independently Interpreted Readings:   Chest X-Ray: Normal heart size.  No infiltrates.  No acute abnormalities.     Medications    sodium chloride 0.9% flush 10 mL (has no administration in time range)   0.9%  NaCl infusion (has no administration in time range)   dextrose 5 % and 0.45 % NaCl infusion (has no administration in time range)   dextrose 10 % infusion (has no administration in time range)   dextrose 10 % infusion (has no administration in time range)   dextrose 10 % infusion (has no administration in time range)   dextrose 10 % infusion (has no administration in time range)   lactated ringers bolus 1,000 mL (0 mLs Intravenous Stopped 8/10/23 2348)   ondansetron injection 4 mg (4 mg Intravenous Given 8/10/23 2151)   pantoprazole injection 40 mg (40 mg Intravenous Given 8/10/23 2151)   fentaNYL 50 mcg/mL injection 50 mcg (50 mcg Intravenous Given 8/10/23 2153)   lactated ringers bolus 1,000 mL (0 mLs Intravenous Stopped 8/11/23 0203)   aluminum-magnesium hydroxide-simethicone 200-200-20 mg/5 mL suspension 15 mL (15 mLs Oral Given 8/10/23 2248)   droPERidol injection 1.25 mg (1.25 mg Intravenous Given 8/11/23 0041)   iohexoL (OMNIPAQUE 350) injection 100 mL (100 mLs Intravenous Given 8/11/23 0139)   valsartan tablet 160 mg (160 mg Oral Given 8/11/23 0224)     Medical Decision Making:   Initial Assessment:   42yo male with DM, HTN, GERD who presents with 2 days of n/v and epigastric abdominal pain. Prior laparoscopic cholecystectomy, not other abdominal surgeries. Afebrile, tachycardic. Epigastric tenderness on exam without rebound.   Differential Diagnosis:   Pancreatitis, gastritis, GERD, constipation, DKA, less likely bowel obstruction, r/o ACS  Clinical Tests:   Lab Tests: Ordered and Reviewed  The following lab test(s) were unremarkable: Lipase       <> Summary of Lab: No leukocytosis, no electrolyte derangements, AG 23  Radiological Study: Ordered  Medical Tests: Ordered  ED Management:  Lipase non consistent with pancreatitis, AG elevated, glucose elevated, will check lactate  Concern for DKA given elevated beta-hydroxybutyrate  and anion gap, glucose 250    Discussed concern for patient being in DKA given his lab abnormalities and need for treatment and to stay in the hospital overnight. Discussed risks of acidosis, cerebral edema, pulmonary edema, and death but patient decided to leave. He states he will follow up with his primary care and urged patient to return his symptoms worsen or he changes his mind.              ED Course as of 08/11/23 0326   Thu Aug 10, 2023   2125 EKG 12-lead  EKG personally reviewed and shows sinus tachycardia.  Has some T-wave inversions in the inferior leads that are present on prior EKG.  Other than rate, grossly unchanged. [CH]      ED Course User Index  [CH] Monalisa Mas MD                 Clinical Impression:   Final diagnoses:  [R11.10] Vomiting  [R06.02] SOB (shortness of breath)        ED Disposition Condition    AMA Serious                Viola Lu MD  Resident  08/11/23 0326

## 2023-08-11 NOTE — ED NOTES
Pt left without vitals. AMA because he had work in the morning and had no more sick days and would get fired if he didn't show.

## 2023-08-25 ENCOUNTER — OFFICE VISIT (OUTPATIENT)
Dept: UROLOGY | Facility: CLINIC | Age: 43
End: 2023-08-25
Payer: MEDICAID

## 2023-08-25 DIAGNOSIS — N52.1 ERECTILE DYSFUNCTION DUE TO DISEASES CLASSIFIED ELSEWHERE: Primary | ICD-10-CM

## 2023-08-25 PROCEDURE — 4010F PR ACE/ARB THEARPY RXD/TAKEN: ICD-10-PCS | Mod: CPTII,95,, | Performed by: NURSE PRACTITIONER

## 2023-08-25 PROCEDURE — 1159F PR MEDICATION LIST DOCUMENTED IN MEDICAL RECORD: ICD-10-PCS | Mod: CPTII,95,, | Performed by: NURSE PRACTITIONER

## 2023-08-25 PROCEDURE — 1160F RVW MEDS BY RX/DR IN RCRD: CPT | Mod: CPTII,95,, | Performed by: NURSE PRACTITIONER

## 2023-08-25 PROCEDURE — 99213 PR OFFICE/OUTPT VISIT, EST, LEVL III, 20-29 MIN: ICD-10-PCS | Mod: 95,,, | Performed by: NURSE PRACTITIONER

## 2023-08-25 PROCEDURE — 4010F ACE/ARB THERAPY RXD/TAKEN: CPT | Mod: CPTII,95,, | Performed by: NURSE PRACTITIONER

## 2023-08-25 PROCEDURE — 1160F PR REVIEW ALL MEDS BY PRESCRIBER/CLIN PHARMACIST DOCUMENTED: ICD-10-PCS | Mod: CPTII,95,, | Performed by: NURSE PRACTITIONER

## 2023-08-25 PROCEDURE — 99213 OFFICE O/P EST LOW 20 MIN: CPT | Mod: 95,,, | Performed by: NURSE PRACTITIONER

## 2023-08-25 PROCEDURE — 1159F MED LIST DOCD IN RCRD: CPT | Mod: CPTII,95,, | Performed by: NURSE PRACTITIONER

## 2023-08-25 NOTE — PROGRESS NOTES
The patient location is: [place]  The chief complaint leading to consultation is: ED    Visit type: audiovisual    Face to Face time with patient: 10  15  minutes of total time spent on the encounter, which includes face to face time and non-face to face time preparing to see the patient (eg, review of tests), Obtaining and/or reviewing separately obtained history, Documenting clinical information in the electronic or other health record, Independently interpreting results (not separately reported) and communicating results to the patient/family/caregiver, or Care coordination (not separately reported).         Each patient to whom he or she provides medical services by telemedicine is:  (1) informed of the relationship between the physician and patient and the respective role of any other health care provider with respect to management of the patient; and (2) notified that he or she may decline to receive medical services by telemedicine and may withdraw from such care at any time.    Notes:    Subjective:      Chadd Alonzo is a 43 y.o. male who returns today regarding his ED.    Long history of ED 2/2 DM.   ED refractory to Viagra, Cialis and trimix injections.   Pt requests referral to [facility] for IPP evaluation   Denies LUTS, Denies GH      The following portions of the patient's history were reviewed and updated as appropriate: allergies, current medications, past family history, past medical history, past social history, past surgical history and problem list.    Review of Systems  A comprehensive multipoint review of systems was negative except as otherwise stated in the HPI.     Objective:   Vitals: There were no vitals taken for this visit.    Physical Exam   General: alert and oriented, no acute distress  Psych: normal judgment and insight, normal mood/affect, and non-anxious    Lab Review   Urinalysis demonstrates no ua   Lab Results   Component Value Date    WBC 10.32 08/10/2023    HGB 16.9 08/10/2023     HCT 47 08/11/2023    HCT 50.5 08/10/2023    MCV 86 08/10/2023     08/10/2023     Lab Results   Component Value Date    CREATININE 0.8 08/10/2023    BUN 11 08/10/2023       Assessment and Plan:   1. Erectile dysfunction due to diseases classified elsewhere  - Ambulatory referral/consult to Urology; Future     --rtc PRN     This note is dictated on M*Modal word recognition program.  There are word recognition mistakes that are occasionally missed on review.

## 2023-12-19 ENCOUNTER — HOSPITAL ENCOUNTER (EMERGENCY)
Facility: HOSPITAL | Age: 43
Discharge: HOME | End: 2023-12-19
Attending: STUDENT IN AN ORGANIZED HEALTH CARE EDUCATION/TRAINING PROGRAM
Payer: MEDICAID

## 2023-12-19 VITALS
SYSTOLIC BLOOD PRESSURE: 184 MMHG | WEIGHT: 250 LBS | TEMPERATURE: 98 F | HEIGHT: 72 IN | RESPIRATION RATE: 20 BRPM | DIASTOLIC BLOOD PRESSURE: 89 MMHG | OXYGEN SATURATION: 100 % | HEART RATE: 105 BPM | BODY MASS INDEX: 33.86 KG/M2

## 2023-12-19 DIAGNOSIS — R10.13 EPIGASTRIC PAIN: Primary | ICD-10-CM

## 2023-12-19 DIAGNOSIS — R11.10 VOMITING: ICD-10-CM

## 2023-12-19 DIAGNOSIS — R73.9 HYPERGLYCEMIA: ICD-10-CM

## 2023-12-19 LAB
ALBUMIN SERPL BCP-MCNC: 4 G/DL (ref 3.5–5.2)
ALLENS TEST: ABNORMAL
ALP SERPL-CCNC: 65 U/L (ref 55–135)
ALT SERPL W/O P-5'-P-CCNC: 27 U/L (ref 10–44)
ANION GAP SERPL CALC-SCNC: 13 MMOL/L (ref 8–16)
AST SERPL-CCNC: 16 U/L (ref 10–40)
B-OH-BUTYR BLD STRIP-SCNC: 2.2 MMOL/L (ref 0–0.5)
BACTERIA #/AREA URNS AUTO: NORMAL /HPF
BASOPHILS # BLD AUTO: 0.02 K/UL (ref 0–0.2)
BASOPHILS NFR BLD: 0.3 % (ref 0–1.9)
BILIRUB SERPL-MCNC: 0.6 MG/DL (ref 0.1–1)
BILIRUB UR QL STRIP: NEGATIVE
BNP SERPL-MCNC: 17 PG/ML (ref 0–99)
BUN SERPL-MCNC: 8 MG/DL (ref 6–20)
CALCIUM SERPL-MCNC: 9.7 MG/DL (ref 8.7–10.5)
CHLORIDE SERPL-SCNC: 105 MMOL/L (ref 95–110)
CLARITY UR REFRACT.AUTO: CLEAR
CO2 SERPL-SCNC: 20 MMOL/L (ref 23–29)
COLOR UR AUTO: YELLOW
CREAT SERPL-MCNC: 0.8 MG/DL (ref 0.5–1.4)
DELSYS: ABNORMAL
DIFFERENTIAL METHOD: NORMAL
EOSINOPHIL # BLD AUTO: 0 K/UL (ref 0–0.5)
EOSINOPHIL NFR BLD: 0 % (ref 0–8)
ERYTHROCYTE [DISTWIDTH] IN BLOOD BY AUTOMATED COUNT: 13 % (ref 11.5–14.5)
EST. GFR  (NO RACE VARIABLE): >60 ML/MIN/1.73 M^2
GLUCOSE SERPL-MCNC: 294 MG/DL (ref 70–110)
GLUCOSE UR QL STRIP: ABNORMAL
HCO3 UR-SCNC: 20.2 MMOL/L (ref 24–28)
HCT VFR BLD AUTO: 46.5 % (ref 40–54)
HGB BLD-MCNC: 16.2 G/DL (ref 14–18)
HGB UR QL STRIP: NEGATIVE
HYALINE CASTS UR QL AUTO: 0 /LPF
IMM GRANULOCYTES # BLD AUTO: 0.02 K/UL (ref 0–0.04)
IMM GRANULOCYTES NFR BLD AUTO: 0.3 % (ref 0–0.5)
KETONES UR QL STRIP: ABNORMAL
LEUKOCYTE ESTERASE UR QL STRIP: NEGATIVE
LIPASE SERPL-CCNC: 17 U/L (ref 4–60)
LYMPHOCYTES # BLD AUTO: 1.2 K/UL (ref 1–4.8)
LYMPHOCYTES NFR BLD: 21.4 % (ref 18–48)
MCH RBC QN AUTO: 28.9 PG (ref 27–31)
MCHC RBC AUTO-ENTMCNC: 34.8 G/DL (ref 32–36)
MCV RBC AUTO: 83 FL (ref 82–98)
MICROSCOPIC COMMENT: NORMAL
MODE: ABNORMAL
MONOCYTES # BLD AUTO: 0.4 K/UL (ref 0.3–1)
MONOCYTES NFR BLD: 6.6 % (ref 4–15)
NEUTROPHILS # BLD AUTO: 4.1 K/UL (ref 1.8–7.7)
NEUTROPHILS NFR BLD: 71.4 % (ref 38–73)
NITRITE UR QL STRIP: NEGATIVE
NRBC BLD-RTO: 0 /100 WBC
PCO2 BLDA: 24.9 MMHG (ref 35–45)
PH SMN: 7.52 [PH] (ref 7.35–7.45)
PH UR STRIP: 5 [PH] (ref 5–8)
PLATELET # BLD AUTO: 317 K/UL (ref 150–450)
PMV BLD AUTO: 9.5 FL (ref 9.2–12.9)
PO2 BLDA: 27 MMHG (ref 40–60)
POC BE: -3 MMOL/L
POC SATURATED O2: 59 % (ref 95–100)
POC TCO2: 21 MMOL/L (ref 24–29)
POCT GLUCOSE: 220 MG/DL (ref 70–110)
POCT GLUCOSE: 246 MG/DL (ref 70–110)
POCT GLUCOSE: 307 MG/DL (ref 70–110)
POTASSIUM SERPL-SCNC: 4.2 MMOL/L (ref 3.5–5.1)
PROT SERPL-MCNC: 7.6 G/DL (ref 6–8.4)
PROT UR QL STRIP: ABNORMAL
RBC # BLD AUTO: 5.61 M/UL (ref 4.6–6.2)
RBC #/AREA URNS AUTO: 0 /HPF (ref 0–4)
SAMPLE: ABNORMAL
SITE: ABNORMAL
SODIUM SERPL-SCNC: 138 MMOL/L (ref 136–145)
SP GR UR STRIP: 1.03 (ref 1–1.03)
SQUAMOUS #/AREA URNS AUTO: 0 /HPF
TROPONIN I SERPL DL<=0.01 NG/ML-MCNC: <0.006 NG/ML (ref 0–0.03)
URN SPEC COLLECT METH UR: ABNORMAL
WBC # BLD AUTO: 5.76 K/UL (ref 3.9–12.7)
WBC #/AREA URNS AUTO: 1 /HPF (ref 0–5)
YEAST UR QL AUTO: NORMAL

## 2023-12-19 PROCEDURE — 82962 GLUCOSE BLOOD TEST: CPT

## 2023-12-19 PROCEDURE — 96376 TX/PRO/DX INJ SAME DRUG ADON: CPT

## 2023-12-19 PROCEDURE — 96375 TX/PRO/DX INJ NEW DRUG ADDON: CPT

## 2023-12-19 PROCEDURE — 94761 N-INVAS EAR/PLS OXIMETRY MLT: CPT | Mod: XB

## 2023-12-19 PROCEDURE — 93010 EKG 12-LEAD: ICD-10-PCS | Mod: ,,, | Performed by: INTERNAL MEDICINE

## 2023-12-19 PROCEDURE — 63600175 PHARM REV CODE 636 W HCPCS: Performed by: PHYSICIAN ASSISTANT

## 2023-12-19 PROCEDURE — 99285 EMERGENCY DEPT VISIT HI MDM: CPT | Mod: 25

## 2023-12-19 PROCEDURE — 93005 ELECTROCARDIOGRAM TRACING: CPT

## 2023-12-19 PROCEDURE — 93010 ELECTROCARDIOGRAM REPORT: CPT | Mod: ,,, | Performed by: INTERNAL MEDICINE

## 2023-12-19 PROCEDURE — 85025 COMPLETE CBC W/AUTO DIFF WBC: CPT | Performed by: PHYSICIAN ASSISTANT

## 2023-12-19 PROCEDURE — 82010 KETONE BODYS QUAN: CPT | Performed by: PHYSICIAN ASSISTANT

## 2023-12-19 PROCEDURE — 99900035 HC TECH TIME PER 15 MIN (STAT)

## 2023-12-19 PROCEDURE — 83690 ASSAY OF LIPASE: CPT | Performed by: PHYSICIAN ASSISTANT

## 2023-12-19 PROCEDURE — 25500020 PHARM REV CODE 255: Performed by: STUDENT IN AN ORGANIZED HEALTH CARE EDUCATION/TRAINING PROGRAM

## 2023-12-19 PROCEDURE — 80053 COMPREHEN METABOLIC PANEL: CPT | Performed by: PHYSICIAN ASSISTANT

## 2023-12-19 PROCEDURE — 84484 ASSAY OF TROPONIN QUANT: CPT | Performed by: PHYSICIAN ASSISTANT

## 2023-12-19 PROCEDURE — 81001 URINALYSIS AUTO W/SCOPE: CPT | Performed by: PHYSICIAN ASSISTANT

## 2023-12-19 PROCEDURE — 82803 BLOOD GASES ANY COMBINATION: CPT

## 2023-12-19 PROCEDURE — 96374 THER/PROPH/DIAG INJ IV PUSH: CPT

## 2023-12-19 PROCEDURE — 96361 HYDRATE IV INFUSION ADD-ON: CPT

## 2023-12-19 PROCEDURE — 83880 ASSAY OF NATRIURETIC PEPTIDE: CPT | Performed by: PHYSICIAN ASSISTANT

## 2023-12-19 PROCEDURE — 25000003 PHARM REV CODE 250: Performed by: PHYSICIAN ASSISTANT

## 2023-12-19 RX ORDER — ONDANSETRON 2 MG/ML
4 INJECTION INTRAMUSCULAR; INTRAVENOUS
Status: COMPLETED | OUTPATIENT
Start: 2023-12-19 | End: 2023-12-19

## 2023-12-19 RX ORDER — MORPHINE SULFATE 4 MG/ML
4 INJECTION, SOLUTION INTRAMUSCULAR; INTRAVENOUS
Status: COMPLETED | OUTPATIENT
Start: 2023-12-19 | End: 2023-12-19

## 2023-12-19 RX ORDER — MAG HYDROX/ALUMINUM HYD/SIMETH 200-200-20
30 SUSPENSION, ORAL (FINAL DOSE FORM) ORAL
Status: COMPLETED | OUTPATIENT
Start: 2023-12-19 | End: 2023-12-19

## 2023-12-19 RX ORDER — FAMOTIDINE 20 MG/1
20 TABLET, FILM COATED ORAL 2 TIMES DAILY
Qty: 20 TABLET | Refills: 0 | Status: SHIPPED | OUTPATIENT
Start: 2023-12-19 | End: 2024-12-18

## 2023-12-19 RX ORDER — ONDANSETRON 4 MG/1
4 TABLET, ORALLY DISINTEGRATING ORAL EVERY 8 HOURS PRN
Qty: 12 TABLET | Refills: 0 | Status: SHIPPED | OUTPATIENT
Start: 2023-12-19 | End: 2023-12-23

## 2023-12-19 RX ORDER — FAMOTIDINE 20 MG/1
20 TABLET, FILM COATED ORAL
Status: COMPLETED | OUTPATIENT
Start: 2023-12-19 | End: 2023-12-19

## 2023-12-19 RX ORDER — DROPERIDOL 2.5 MG/ML
0.62 INJECTION, SOLUTION INTRAMUSCULAR; INTRAVENOUS
Status: COMPLETED | OUTPATIENT
Start: 2023-12-19 | End: 2023-12-19

## 2023-12-19 RX ADMIN — FAMOTIDINE 20 MG: 20 TABLET, FILM COATED ORAL at 02:12

## 2023-12-19 RX ADMIN — SODIUM CHLORIDE 1000 ML: 9 INJECTION, SOLUTION INTRAVENOUS at 03:12

## 2023-12-19 RX ADMIN — SODIUM CHLORIDE 1000 ML: 9 INJECTION, SOLUTION INTRAVENOUS at 12:12

## 2023-12-19 RX ADMIN — MORPHINE SULFATE 4 MG: 4 INJECTION INTRAVENOUS at 12:12

## 2023-12-19 RX ADMIN — ONDANSETRON 4 MG: 2 INJECTION INTRAMUSCULAR; INTRAVENOUS at 02:12

## 2023-12-19 RX ADMIN — ALUMINUM HYDROXIDE, MAGNESIUM HYDROXIDE, AND SIMETHICONE 30 ML: 200; 200; 20 SUSPENSION ORAL at 04:12

## 2023-12-19 RX ADMIN — ONDANSETRON 4 MG: 2 INJECTION INTRAMUSCULAR; INTRAVENOUS at 12:12

## 2023-12-19 RX ADMIN — INSULIN HUMAN 5 UNITS: 100 INJECTION, SOLUTION PARENTERAL at 03:12

## 2023-12-19 RX ADMIN — DROPERIDOL 0.62 MG: 2.5 INJECTION, SOLUTION INTRAMUSCULAR; INTRAVENOUS at 09:12

## 2023-12-19 RX ADMIN — MORPHINE SULFATE 4 MG: 4 INJECTION INTRAVENOUS at 04:12

## 2023-12-19 RX ADMIN — IOHEXOL 100 ML: 350 INJECTION, SOLUTION INTRAVENOUS at 07:12

## 2023-12-19 NOTE — ED TRIAGE NOTES
Patient presents to the ED via EMS from work, patient states he has been having abdominal pain, nausea, vomiting, since he woke up this morning.

## 2023-12-19 NOTE — Clinical Note
"Chadd"Lui Alonzo was seen and treated in our emergency department on 12/19/2023.  He may return to work on 12/22/2023.       If you have any questions or concerns, please don't hesitate to call.      Merly Birch PA-C"

## 2023-12-19 NOTE — ED PROVIDER NOTES
Encounter Date: 12/19/2023       History     Chief Complaint   Patient presents with    Nausea    Vomiting     Acute onset x6 hours. +epigastric pain     Hyperglycemia     Patient reports insulin compliance -      43 y.o. M with a PMHx of T2DM and HTN presents to the ED with abdominal pain starting approximately 6 hours ago. He has associated N/V, and shortness of breath. He has felt this way in the past when his blood sugar gets high. Reports insulin compliance. Last BM was yesterday. Denies fever, chills, cough, congestion, chest pain, dysuria, flank pain.     The history is provided by the patient.     Review of patient's allergies indicates:  No Known Allergies  Past Medical History:   Diagnosis Date    Diabetes mellitus     Hypertension      Past Surgical History:   Procedure Laterality Date    LAPAROSCOPIC CHOLECYSTECTOMY N/A 10/12/2022    Procedure: CHOLECYSTECTOMY, LAPAROSCOPIC;  Surgeon: Anjel Akhtar MD;  Location: [facility] OR;  Service: General;  Laterality: N/A;     History reviewed. No pertinent family history.  Social History     Tobacco Use    Smoking status: Never    Smokeless tobacco: Never   Substance Use Topics    Alcohol use: No    Drug use: No     Review of Systems    Physical Exam     Initial Vitals [12/19/23 1154]   BP Pulse Resp Temp SpO2   (!) 168/92 102 18 98.1 °F (36.7 °C) 100 %      MAP       --         Physical Exam    Nursing note and vitals reviewed.  Constitutional: He appears well-developed and well-nourished. He is not diaphoretic. No distress.   HENT:   Head: Normocephalic and atraumatic.   Nose: Nose normal.   Eyes: Conjunctivae and EOM are normal.   Neck: Neck supple.   Cardiovascular:  Normal rate, regular rhythm, normal heart sounds and intact distal pulses.           Pulmonary/Chest: Breath sounds normal. Tachypnea noted.   Abdominal: There is abdominal tenderness in the epigastric area.   Musculoskeletal:      Cervical back: Neck supple.     Neurological: He is alert  and oriented to person, place, and time.   Skin: No rash noted.   Psychiatric: He has a normal mood and affect. Thought content normal.         ED Course   Procedures  Labs Reviewed   COMPREHENSIVE METABOLIC PANEL - Abnormal; Notable for the following components:       Result Value    CO2 20 (*)     Glucose 294 (*)     All other components within normal limits   BETA - HYDROXYBUTYRATE, SERUM - Abnormal; Notable for the following components:    Beta-Hydroxybutyrate 2.2 (*)     All other components within normal limits   URINALYSIS, REFLEX TO URINE CULTURE - Abnormal; Notable for the following components:    Protein, UA 1+ (*)     Glucose, UA 3+ (*)     Ketones, UA 3+ (*)     All other components within normal limits    Narrative:     Specimen Source->Urine   ISTAT PROCEDURE - Abnormal; Notable for the following components:    POC PH 7.518 (*)     POC PCO2 24.9 (*)     POC PO2 27 (*)     POC HCO3 20.2 (*)     POC BE -3 (*)     POC TCO2 21 (*)     All other components within normal limits   POCT GLUCOSE - Abnormal; Notable for the following components:    POCT Glucose 307 (*)     All other components within normal limits   POCT GLUCOSE - Abnormal; Notable for the following components:    POCT Glucose 246 (*)     All other components within normal limits   POCT GLUCOSE - Abnormal; Notable for the following components:    POCT Glucose 220 (*)     All other components within normal limits   CBC W/ AUTO DIFFERENTIAL   LIPASE   B-TYPE NATRIURETIC PEPTIDE   TROPONIN I   URINALYSIS MICROSCOPIC    Narrative:     Specimen Source->Urine     EKG Readings: (Independently Interpreted)   Initial Reading: No STEMI. Rhythm: Normal Sinus Rhythm. Heart Rate: 97. T Waves Flipped: III. Axis: Normal.     ECG Results              EKG 12-lead (Final result)  Result time 12/19/23 16:32:49      Final result by Interface, Lab In [facility] (12/19/23 16:32:49)                   Narrative:    Test Reason : R11.10,    Vent. Rate : 097 BPM     Atrial  Rate : 097 BPM     P-R Int : 150 ms          QRS Dur : 086 ms      QT Int : 350 ms       P-R-T Axes : 048 078 016 degrees     QTc Int : 444 ms    Normal sinus rhythm  Nonspecific T wave abnormality  Abnormal ECG  When compared with ECG of 10-AUG-2023 21:23,  No significant change was found  Confirmed by Chasity Reyes MD ([id number]) on 12/19/2023 4:32:42 PM    Referred By: [email or web address]   SELF           Confirmed By:Chasity Reyes MD                                  Imaging Results              CT Abdomen Pelvis With IV Contrast NO Oral Contrast (Final result)  Result time 12/19/23 20:32:43      Final result by Anjel Carbajal MD (12/19/23 20:32:43)                   Impression:      1. Findings may reflect hepatic steatosis, correlation with LFTs recommended.  2. No convincing obstructive uropathy.  3. Please see above for several additional findings.      Electronically signed by: Anjel Carbajal MD  Date:    12/19/2023  Time:    20:32               Narrative:    EXAMINATION:  CT ABDOMEN PELVIS WITH IV CONTRAST    CLINICAL HISTORY:  Epigastric pain;    TECHNIQUE:  Low dose axial images, sagittal and coronal reformations were obtained from the lung bases to the pubic symphysis following the IV administration of 100 mL of Omnipaque 350 .  Oral contrast was not given.    COMPARISON:  08/11/2023    FINDINGS:  Images of the lower thorax are remarkable for bilateral dependent atelectasis noting slight ground-glass attenuation bilateral lower lobes.    The liver is hypoattenuating suggesting steatosis, correlation with LFTs recommended.  The spleen, pancreas, and adrenal glands are grossly unremarkable.  The gallbladder is surgically absent.  The portal vein, splenic vein, SMV, celiac axis and SMA all are patent.  Stomach is distended with ingested content, no gastric wall thickening.  No significant abdominal lymphadenopathy.    There is excretion of contrast by the kidneys limiting evaluation for nephrolithiasis noting  no nephrolithiasis on prior exam.  There is no hydronephrosis.  There is a punctate focus of low attenuation within the interpolar region of the right kidney, too small for characterization.  The bilateral ureters are unremarkable without calculi seen noting contrast within the ureters limits evaluation for the same.  The urinary bladder is distended noting contrast layers posteriorly.  The prostate is prominent.    There are a few scattered colonic diverticula without inflammation.  The terminal ileum is unremarkable.  The appendix is unremarkable.  The small bowel is grossly unremarkable.  There are several scattered shotty periaortic, pericaval, and mesenteric lymph nodes.  No focal organized pelvic fluid collection.    There are degenerative changes of the spine.  Degenerative changes most significantly involve L5-S1.  No significant inguinal lymphadenopathy.                                       X-Ray Chest AP Portable (Final result)  Result time 12/19/23 13:10:36      Final result by Eyal Nguyen MD (12/19/23 13:10:36)                   Impression:      See above      Electronically signed by: Eyal Nguyen MD  Date:    12/19/2023  Time:    13:10               Narrative:    EXAMINATION:  XR CHEST AP PORTABLE    CLINICAL HISTORY:  hyperglycemia;    TECHNIQUE:  Single frontal view of the chest was performed.    COMPARISON:  08/10/2023    FINDINGS:  Cardiac size is normal.  Lungs are clear and well aerated.  No infiltrate is identified.                                       Medications   sodium chloride 0.9% bolus 1,000 mL 1,000 mL (0 mLs Intravenous Stopped 12/19/23 1521)   ondansetron injection 4 mg (4 mg Intravenous Given 12/19/23 1253)   morphine injection 4 mg (4 mg Intravenous Given 12/19/23 1253)   famotidine tablet 20 mg (20 mg Oral Given 12/19/23 1414)   ondansetron injection 4 mg (4 mg Intravenous Given 12/19/23 1414)   insulin regular injection 5 Units 0.05 mL (5 Units Intravenous Given  12/19/23 1509)   sodium chloride 0.9% bolus 1,000 mL 1,000 mL (0 mLs Intravenous Stopped 12/19/23 1637)   aluminum-magnesium hydroxide-simethicone 200-200-20 mg/5 mL suspension 30 mL (30 mLs Oral Given 12/19/23 1609)   morphine injection 4 mg (4 mg Intravenous Given 12/19/23 1655)   iohexoL (OMNIPAQUE 350) injection 100 mL (100 mLs Intravenous Given 12/19/23 1959)   droPERidol injection 0.625 mg (0.625 mg Intravenous Given 12/19/23 2104)     Medical Decision Making  43 y.o. M with a PMHx of T2DM and HTN presents to the ED with abdominal pain starting approximately 6 hours ago. Appears uncomfortable. Tachypnea noted on exam.  Vitals with HTN. Afebrile. I will initiate workup and reassess.      Ddx: DKA, hyperglycemia, pancreatitis, gastritis, ACS, dehydration, electrolyte derangement, SBO, gastritis, colitis    - beta elevated at 2.2. Bicarb mildly decreased though normal anion gap. No acidosis on VBG.  I do not suspect DKA at this time, though patient is hyperglycemic. IV fluids and bolus of insulin given with improvement in hyperglycemia.    on POC testing.  - Presented with abdominal pain, CT AP negative for acute intra-abdominal findings including air-fluid levels, bowel wall thickening, fat stranding, perforation that may explain patient's epigastric abdominal pain  - Normal lipase, LFTs, alk-phos, bilirubin.  Gallbladder has been surgically removed in the past.  I do not suspect pancreatitis, retained stone or other biliary tract pathology given normal laboratory workup.  - CXR without pneumonia, effusion, pulmonary edema. Normal troponin and BNP. EKG with ST segmental elevation/depression. I do not suspect cardiac origin of symptoms today   - On reassessment, he was resting comfortably.  Sleeping on reassessment.  He is no longer tachypneic. Notes some improvement in his abdominal pain.  I believe he is stable at this time for discharge given reassuring workup.  Given short course of Zofran for nausea  and Pepcid for epigastric pain.  A referral was placed with gastroenterology if epigastric pain does not improve.  - Strict ED precautions given to return immediately for new, worsening, or concerning symptoms    Amount and/or Complexity of Data Reviewed  Labs: ordered. Decision-making details documented in ED Course.  Radiology: ordered.    Risk  OTC drugs.  Prescription drug management.              Attending Attestation:     Physician Attestation Statement for NP/PA:   I have directed and reviewed the workup performed by the PA/NP.  I performed the substantive portion of the medical decision making.     Other NP/PA Attestation Additions:      Medical Decision Making: Giving IV insulin and fluids given elevated serum ketones but no DKA.     Procedure: Critical Care  Please put in 30 minutes of critical care              ED Course as of 12/20/23 1635   Tue Dec 19, 2023   1346 Anion Gap: 13 [HM]   1346 Potassium: 4.2 [HM]   1346 CO2(!): 20 [HM]   1346 Troponin I: <0.006 [HM]   1346 POC PH(!): 7.518 [HM]   1346 Beta-Hydroxybutyrate(!): 2.2 [HM]   1346 Glucose(!): 294 [HM]   1748 Ketones, UA(!): 3+ [HM]   1748 Glucose, UA(!): 3+ [HM]      ED Course User Index  [HM] Merly Birch PA-C                           Clinical Impression:  Final diagnoses:  [R11.10] Vomiting  [R73.9] Hyperglycemia  [R10.13] Epigastric pain (Primary)          ED Disposition Condition    Discharge Stable          ED Prescriptions       Medication Sig Dispense Start Date End Date Auth. Provider    famotidine (PEPCID) 20 MG tablet Take 1 tablet (20 mg total) by mouth 2 (two) times daily. 20 tablet 12/19/2023 12/18/2024 Merly Birch PA-C    ondansetron (ZOFRAN-ODT) 4 MG TbDL Take 1 tablet (4 mg total) by mouth every 8 (eight) hours as needed (nausea and vomiting). 12 tablet 12/19/2023 12/23/2023 Merly Birch PA-C          Follow-up Information       Follow up With Specialties Details Why Contact Info    [facility] - Emergency Dept  Emergency Medicine  If symptoms worsen [address]  [facility] [id number] [phone]    Gastroenterology, [facility] - Gastroenterology Schedule an appointment as soon as possible for a visit   [address] [place] [id number]  [phone]               Merly Birch PA-C  12/19/23 2218       Graham Godwin MD  12/20/23 1004

## 2023-12-20 NOTE — DISCHARGE INSTRUCTIONS
You blood glucose was elevated today. I do not believe you were in DKA today. Please monitor blood sugar at home.   I suspect may be caused by gastritis.  Pepcid prescribed today for this.  Please follow-up with gastroenterology if your symptoms do not improve.   Drink plenty of fluids   Zofran prescirbed for nausea and vomiting. Take as directed and needed   Strict ED precautions given to return immediately for new, worsening, or concerning symptoms

## 2024-03-07 ENCOUNTER — TELEPHONE (OUTPATIENT)
Dept: UROLOGY | Facility: CLINIC | Age: 44
End: 2024-03-07
Payer: MEDICAID

## (undated) DEVICE — APPLIER CLIP ENDO LIGAMAX 5MM

## (undated) DEVICE — ELECTRODE EZ CLEAN L-HOOK 13.5

## (undated) DEVICE — TROCAR KII BLLN 12MM 10CM

## (undated) DEVICE — TOWEL OR DISP STRL BLUE 4/PK

## (undated) DEVICE — NDL HYPO REG 25G X 1 1/2

## (undated) DEVICE — COVER OVERHEAD SURG LT BLUE

## (undated) DEVICE — CLIP HEMO-LOK ML

## (undated) DEVICE — BAG TISS RETRV MONARCH 10MM

## (undated) DEVICE — SUT VICRYL+ 27 UR-6 VIOL

## (undated) DEVICE — SUT VICRYL PLUS 4-0 P3 18IN

## (undated) DEVICE — BLADE SURG CARBON STEEL SZ11

## (undated) DEVICE — PACK ENDOSCOPY GENERAL

## (undated) DEVICE — CLOSURE SKIN STERI STRIP 1/2X4

## (undated) DEVICE — APPLICATOR CHLORAPREP ORN 26ML

## (undated) DEVICE — Device

## (undated) DEVICE — SUT ENDOLOOP PDSII 18 LIGA

## (undated) DEVICE — SYR 10CC LUER LOCK

## (undated) DEVICE — GLOVE BIOGEL 7.5

## (undated) DEVICE — TROCAR ENDOPATH XCEL 5X100MM

## (undated) DEVICE — APPLICATOR ARISTA FLEX XL

## (undated) DEVICE — SET TUBE PNEUMOCLEAR SE HI FLO

## (undated) DEVICE — DRESSING ADH ISLAND 2.5 X 3

## (undated) DEVICE — ELECTRODE REM PLYHSV RETURN 9

## (undated) DEVICE — POWDER ARISTA AH 3G

## (undated) DEVICE — KIT ANTIFOG

## (undated) DEVICE — BLANKET UPPER BODY 78.7X29.9IN